# Patient Record
Sex: MALE | Race: WHITE | Employment: FULL TIME | URBAN - METROPOLITAN AREA
[De-identification: names, ages, dates, MRNs, and addresses within clinical notes are randomized per-mention and may not be internally consistent; named-entity substitution may affect disease eponyms.]

---

## 2017-01-23 ENCOUNTER — OFFICE VISIT (OUTPATIENT)
Dept: SLEEP MEDICINE | Facility: CLINIC | Age: 52
End: 2017-01-23
Attending: NURSE PRACTITIONER
Payer: COMMERCIAL

## 2017-01-23 VITALS
OXYGEN SATURATION: 93 % | WEIGHT: 182.7 LBS | SYSTOLIC BLOOD PRESSURE: 127 MMHG | HEART RATE: 70 BPM | TEMPERATURE: 96.7 F | RESPIRATION RATE: 16 BRPM | HEIGHT: 68 IN | DIASTOLIC BLOOD PRESSURE: 88 MMHG | BODY MASS INDEX: 27.69 KG/M2

## 2017-01-23 DIAGNOSIS — F51.04 PSYCHOPHYSIOLOGIC INSOMNIA: Primary | ICD-10-CM

## 2017-01-23 PROCEDURE — 99211 OFF/OP EST MAY X REQ PHY/QHP: CPT | Mod: ZF

## 2017-01-23 NOTE — PROGRESS NOTES
DATE OF VISIT:  01/23/2017      CHIEF COMPLAINT:  Fabrice Donahue returns to clinic to evaluate response and compliance of CBTI.      HISTORY OF PRESENT ILLNESS:  Mr. Donahue is a 51-year-old gentleman with a 5-year history of difficulty with sleep maintenance insomnia treated with trazodone.  He was spending approximately 8-8-1/2 hours in bed with an estimated total sleep time somewhere between 4 and 5 hours of sleep.  Worry and active thoughts did occur while he would have middle of the night wakeups with difficulty returning to sleep.  He was attempting to block thoughts of work as well with his wakeups.  He was placed on stimulus control and sleep restriction restricting his total sleep time down to about 5 hours.  Over a relatively brief period of time with improved sleep efficiency, he started to enter bed earlier.  Returns to clinic today spending approximately 8 hours in bed and sleeping roughly 7-7-1/2 hours within that period of time.  He continues with wakeups, they are less than 5-10 minutes.  Sleep latency is very brief.  He has employed the use of a meditation and deep breathing to his routine and, in fact, identifies a product called Insight Timer as very helpful to him.  Insight Timer improves his ability to fall asleep if a wakeup is prolonged.  Also, he has played around with adding exercise in the afternoon and late evening which seems to help him initiate sleep as well.  He rates his quality of sleep somewhere between a 2 and a 3 (3 hightest) most nights now and realizes that one bad night does not make a bad outcome with regard to his ability to perform at work.  He has been off of trazodone now for roughly 11 weeks without any difficulties.  One item which he did not entirely employ is stimulus control, and he reported that he just could not see himself getting up out of his bed at night when it is relatively cold in the house.      Allergies:    Allergies   Allergen Reactions     Ivp Dye [Contrast  "Dye]        Medications:    Current Outpatient Prescriptions   Medication Sig Dispense Refill     triamcinolone (KENALOG) 0.1 % cream Apply sparingly to affected area two times daily as needed for up to 14 days 45 g 1     pravastatin (PRAVACHOL) 20 MG tablet Take 1 tablet (20 mg) by mouth daily 90 tablet 2     fish oil-omega-3 fatty acids 1000 MG capsule Take 2 capsules by mouth daily. 180 capsule 12     Multiple Vitamin (MULTI-VITAMIN) per tablet Take 1 tablet by mouth daily. 100 tablet 12       Problem List:  Patient Active Problem List    Diagnosis Date Noted     Psychophysiologic insomnia 12/16/2016     Priority: Medium     Overweight (BMI 25.0-29.9) 01/01/2013     Hypertension goal BP (blood pressure) < 140/90 01/01/2013     HYPERLIPIDEMIA LDL GOAL <130 10/31/2010     Fatty liver 06/10/2008        Past Medical/Surgical History:  Past Medical History   Diagnosis Date     Essential hypertension, benign      Mixed hyperlipidemia      Unspecified hemorrhoids without mention of complication      Benign neoplasm of skin of other and unspecified parts of face      atypical nevus     Lyme disease 8/2007     hot welts, bull eye bruise     Other chronic nonalcoholic liver disease 2006     Fatty liver     Syncope 7/08     neg Head/Neck CT     Shoulder pain, right      post clavicular fx     Past Surgical History   Procedure Laterality Date     Surgical history of -   1982     L Inguinal Hernia     Hc us abdomen complete  6/2006     fatty liver     C echo heart xthoracic,complete, w/o doppler  8/08     Nl     Surgical history of -   8/09     R Clavicle Fracture Repair       Physical Examination:  Vitals: /88 mmHg  Pulse 70  Temp(Src) 96.7  F (35.9  C) (Tympanic)  Resp 16  Ht 1.727 m (5' 8\")  Wt 82.872 kg (182 lb 11.2 oz)  BMI 27.79 kg/m2  SpO2 93%  BMI= Body mass index is 27.79 kg/(m^2).    [place] Total Score 1/23/2017   Total score - [place] 3       GENERAL APPEARANCE: healthy, alert and no distress   "   ASSESSMENT AND PLAN:  It is my impression that Mr. Donahue has had an excellent response to cognitive behavioral therapy for insomnia.  He has tapered off of his sleep aid.  He employs sleep restriction and has noted an improvement in his quality of sleep.  Tools used were meditation and the following plan of care has been developed:   1.  Insomnia, psychophysiologic, in remission.  We did discuss if he has a return of insomnia how to utilize the skills that he has developed at this period of time.  I did discuss the benefits of strict adherence to stimulus control, which if a wakeup should become prolonged would limit total time in bed spent while not sleeping and may be the first tool he may want to use rather going with sleep restriction.  He is certainly willing to try any measure at this point should he have a relapse.  His attitudes regarding sleep and the skills that he has developed have been very successful for him and he is eager to continue with his current behavioral measures.      Time spent with patient 30 minutes, of which greater than 50% was spent in counseling, education and coordination of care.         KIRSTEN LOCO, CNP             D: 2017 11:21   T: 2017 12:30   MT: MANJINDER      Name:     MARCUS DONAHUE   MRN:      [id number]        Account:      [id number]   :      1965           Visit Date:   2017      Document: [id number]       cc: Clyde Gardner PsyD

## 2017-01-23 NOTE — NURSING NOTE
"Chief Complaint   Patient presents with     RECHECK     Follow up Insomnia        Initial /88 mmHg  Pulse 70  Temp(Src) 96.7  F (35.9  C) (Tympanic)  Resp 16  Ht 1.727 m (5' 8\")  Wt 82.872 kg (182 lb 11.2 oz)  BMI 27.79 kg/m2  SpO2 93% Estimated body mass index is 27.79 kg/(m^2) as calculated from the following:    Height as of this encounter: 1.727 m (5' 8\").    Weight as of this encounter: 82.872 kg (182 lb 11.2 oz).  BP completed using cuff size: PASCALE Baker  Clinic Coordinator   Registered Medical Assistant   [facility]      "

## 2017-01-23 NOTE — MR AVS SNAPSHOT
After Visit Summary   1/23/2017    Fabrice Donahue    MRN: [id number]           Patient Information     Date Of Birth          1965        Visit Information        Provider Department      1/23/2017 9:00 AM Myesha Borrego APRN [facility], [place], Sleep Study        Care Instructions    Long range plan:  If wakeups with prolonged difficulty represent: 1)Continue with meditation and deep breathing 2) consider stimulus control, up and out of bed if not sleeping, distract thoughts from worry3) keep AM wakeup similar as much as possible 4) consider sleep restriction if necessary.    Good habits: continue with limiting caffeine, continue with exercise    Follow up as necessary.    Contact PCP if bp goes up again  Your BMI is Body mass index is 27.79 kg/(m^2).  Weight management is a personal decision.  If you are interested in exploring weight loss strategies, the following discussion covers the approaches that may be successful. Body mass index (BMI) is one way to tell whether you are at a healthy weight, overweight, or obese. It measures your weight in relation to your height.  A BMI of 18.5 to 24.9 is in the healthy range. A person with a BMI of 25 to 29.9 is considered overweight, and someone with a BMI of 30 or greater is considered obese. More than two-thirds of American adults are considered overweight or obese.  Being overweight or obese increases the risk for further weight gain. Excess weight may lead to heart disease and diabetes.  Creating and following plans for healthy eating and physical activity may help you improve your health.  Weight control is part of healthy lifestyle and includes exercise, emotional health, and healthy eating habits. Careful eating habits lifelong are the mainstay of weight control. Though there are significant health benefits from weight loss, long-term weight loss with diet alone may be very difficult to achieve- studies show long-term success with dietary  management in less than 10% of people. Attaining a healthy weight may be especially difficult to achieve in those with severe obesity. In some cases, medications, devices and surgical management might be considered.  What can you do?  If you are overweight or obese and are interested in methods for weight loss, you should discuss this with your provider.     Consider reducing daily calorie intake by 500 calories.     Keep a food journal.     Avoiding skipping meals, consider cutting portions instead.    Diet combined with exercise helps maintain muscle while optimizing fat loss. Strength training is particularly important for building and maintaining muscle mass. Exercise helps reduce stress, increase energy, and improves fitness. Increasing exercise without diet control, however, may not burn enough calories to loose weight.       Start walking three days a week 10-20 minutes at a time    Work towards walking thirty minutes five days a week     Eventually, increase the speed of your walking for 1-2 minutes at time    In addition, we recommend that you review healthy lifestyles and methods for weight loss available through the National Institutes of Health patient information sites:  http://win.niddk.nih.gov/publications/index.htm    And look into health and wellness programs that may be available through your health insurance provider, employer, local community center, or [employer].    Weight management plan: Patient was referred to their PCP to discuss a diet and exercise plan.            Follow-ups after your visit        Who to contact     If you have questions or need follow up information about today's clinic visit or your schedule please [facility] directly at [phone].  Normal or non-critical lab and imaging results will be communicated to you by MyChart, letter or phone within 4 business days after the clinic has received the results. If you do not hear from us within 7 days,  "please contact the clinic through [employer] or phone. If you have a critical or abnormal lab result, we will notify you by phone as soon as possible.  Submit refill requests through [employer] or call your pharmacy and they will forward the refill request to us. Please allow 3 business days for your refill to be completed.          Additional Information About Your Visit        [employer] Information     [employer] gives you secure access to your electronic health record. If you see a primary care provider, you can also send messages to your care team and make appointments. If you have questions, please call your primary care clinic.  If you do not have a primary care provider, please call [phone] and they will assist you.        Care EveryWhere ID     This is your Care EveryWhere ID. This could be used by other organizations to access your [facility] medical records  [id number]        Your Vitals Were     Pulse Temperature Respirations Height BMI (Body Mass Index) Pulse Oximetry    70 96.7  F (35.9  C) (Tympanic) 16 1.727 m (5' 8\") 27.79 kg/m2 93%       Blood Pressure from Last 3 Encounters:   01/23/17 127/88   12/16/16 155/96   12/02/16 145/95    Weight from Last 3 Encounters:   01/23/17 82.872 kg (182 lb 11.2 oz)   12/16/16 83.008 kg (183 lb)   12/02/16 81.874 kg (180 lb 8 oz)              Today, you had the following     No orders found for display       Primary Care Provider Office Phone # Fax #    Joel Daniel Irwin Wegener, [phone]       [facility] [address] [facility] [address]        Thank you!     Thank you for choosing [facility]  for your care. Our goal is always to provide you with excellent care. Hearing back from our patients is one way we can continue to improve our services. Please take a few minutes to complete the written survey that you may receive in the mail after your visit with us. Thank you!             Your Updated Medication List - Protect others " around you: Learn how to safely use, store and throw away your medicines at www.disposemymeds.org.          This list is accurate as of: 1/23/17  9:35 AM.  Always use your most recent med list.                   Brand Name Dispense Instructions for use    fish oil-omega-3 fatty acids 1000 MG capsule     180 capsule    Take 2 capsules by mouth daily.       Multi-vitamin Tabs tablet   Generic drug:  multivitamin, therapeutic with minerals     100 tablet    Take 1 tablet by mouth daily.       pravastatin 20 MG tablet    PRAVACHOL    90 tablet    Take 1 tablet (20 mg) by mouth daily       triamcinolone 0.1 % cream    KENALOG    45 g    Apply sparingly to affected area two times daily as needed for up to 14 days

## 2017-01-23 NOTE — PROGRESS NOTES
"Allergies:    Allergies   Allergen Reactions     Ivp Dye [Contrast Dye]        Medications:    Current Outpatient Prescriptions   Medication Sig Dispense Refill     triamcinolone (KENALOG) 0.1 % cream Apply sparingly to affected area two times daily as needed for up to 14 days 45 g 1     pravastatin (PRAVACHOL) 20 MG tablet Take 1 tablet (20 mg) by mouth daily 90 tablet 2     fish oil-omega-3 fatty acids 1000 MG capsule Take 2 capsules by mouth daily. 180 capsule 12     Multiple Vitamin (MULTI-VITAMIN) per tablet Take 1 tablet by mouth daily. 100 tablet 12       Problem List:  Patient Active Problem List    Diagnosis Date Noted     Psychophysiologic insomnia 12/16/2016     Priority: Medium     Overweight (BMI 25.0-29.9) 01/01/2013     Hypertension goal BP (blood pressure) < 140/90 01/01/2013     HYPERLIPIDEMIA LDL GOAL <130 10/31/2010     Fatty liver 06/10/2008        Past Medical/Surgical History:  Past Medical History   Diagnosis Date     Essential hypertension, benign      Mixed hyperlipidemia      Unspecified hemorrhoids without mention of complication      Benign neoplasm of skin of other and unspecified parts of face      atypical nevus     Lyme disease 8/2007     hot welts, bull eye bruise     Other chronic nonalcoholic liver disease 2006     Fatty liver     Syncope 7/08     neg Head/Neck CT     Shoulder pain, right      post clavicular fx     Past Surgical History   Procedure Laterality Date     Surgical history of -   1982     L Inguinal Hernia     Hc us abdomen complete  6/2006     fatty liver     C echo heart xthoracic,complete, w/o doppler  8/08     Nl     Surgical history of -   8/09     R Clavicle Fracture Repair           Physical Examination:  Vitals: /88 mmHg  Pulse 70  Temp(Src) 96.7  F (35.9  C) (Tympanic)  Resp 16  Ht 1.727 m (5' 8\")  Wt 82.872 kg (182 lb 11.2 oz)  BMI 27.79 kg/m2  SpO2 93%  BMI= Body mass index is 27.79 kg/(m^2).         [place] Total Score 1/23/2017   Total score - " [place] 3       GENERAL APPEARANCE: healthy, alert and no distress    Insomnia  bp good control

## 2017-02-28 ENCOUNTER — OFFICE VISIT (OUTPATIENT)
Dept: FAMILY MEDICINE | Facility: CLINIC | Age: 52
End: 2017-02-28
Payer: COMMERCIAL

## 2017-02-28 VITALS
WEIGHT: 172.5 LBS | HEIGHT: 68 IN | DIASTOLIC BLOOD PRESSURE: 84 MMHG | SYSTOLIC BLOOD PRESSURE: 128 MMHG | RESPIRATION RATE: 20 BRPM | TEMPERATURE: 98.1 F | OXYGEN SATURATION: 98 % | HEART RATE: 70 BPM | BODY MASS INDEX: 26.14 KG/M2

## 2017-02-28 DIAGNOSIS — K76.0 FATTY LIVER: ICD-10-CM

## 2017-02-28 DIAGNOSIS — Z00.00 ROUTINE GENERAL MEDICAL EXAMINATION AT A HEALTH CARE FACILITY: Primary | ICD-10-CM

## 2017-02-28 DIAGNOSIS — Z13.1 SCREENING FOR DIABETES MELLITUS: ICD-10-CM

## 2017-02-28 DIAGNOSIS — E66.3 OVERWEIGHT (BMI 25.0-29.9): ICD-10-CM

## 2017-02-28 DIAGNOSIS — E78.5 HYPERLIPIDEMIA LDL GOAL <130: ICD-10-CM

## 2017-02-28 LAB
ALT SERPL W P-5'-P-CCNC: 45 U/L (ref 0–70)
AST SERPL W P-5'-P-CCNC: 27 U/L (ref 0–45)
CHOLEST SERPL-MCNC: 203 MG/DL
GLUCOSE SERPL-MCNC: 98 MG/DL (ref 70–99)
HDLC SERPL-MCNC: 36 MG/DL
LDLC SERPL CALC-MCNC: 132 MG/DL
NONHDLC SERPL-MCNC: 169 MG/DL
TRIGL SERPL-MCNC: 186 MG/DL

## 2017-02-28 PROCEDURE — 99396 PREV VISIT EST AGE 40-64: CPT | Performed by: FAMILY MEDICINE

## 2017-02-28 PROCEDURE — 82947 ASSAY GLUCOSE BLOOD QUANT: CPT | Performed by: FAMILY MEDICINE

## 2017-02-28 PROCEDURE — 80061 LIPID PANEL: CPT | Performed by: FAMILY MEDICINE

## 2017-02-28 PROCEDURE — 36415 COLL VENOUS BLD VENIPUNCTURE: CPT | Performed by: FAMILY MEDICINE

## 2017-02-28 PROCEDURE — 84460 ALANINE AMINO (ALT) (SGPT): CPT | Performed by: FAMILY MEDICINE

## 2017-02-28 PROCEDURE — 84450 TRANSFERASE (AST) (SGOT): CPT | Performed by: FAMILY MEDICINE

## 2017-02-28 RX ORDER — PRAVASTATIN SODIUM 20 MG
20 TABLET ORAL DAILY
Qty: 90 TABLET | Refills: 3 | Status: SHIPPED | OUTPATIENT
Start: 2017-02-28 | End: 2018-01-02

## 2017-02-28 NOTE — MR AVS SNAPSHOT
After Visit Summary   2/28/2017    Fabrice Donahue    MRN: [id number]           Patient Information     Date Of Birth          1965        Visit Information        Provider Department      2/28/2017 8:00 AM Wegener, Joel Daniel Irwin, MD [facility]        Today's Diagnoses     Routine general medical examination at a health care facility    -  1    Hyperlipidemia LDL goal <130        Fatty liver        Overweight (BMI 25.0-29.9)        Screening for diabetes mellitus          Care Instructions    ASSESSMENT AND PLAN  1. Routine general medical examination at a health care facility  Had flu shot already.   Doing well overall.     2. Hyperlipidemia LDL goal <130    - Lipid panel reflex to direct LDL    3. Fatty liver    - ALT  - AST    4. Overweight (BMI 25.0-29.9)    - Glucose    5. Screening for diabetes mellitus    - Glucose        MYCHART SIGNUP FOR E-VISITS AND EASIER COMMUNICATION:  [email or web address]     Zipnosis:  [place].  Sign up for e-visits for common illnesses!     RADIOLOGY:   [facility]:  [phone] to schedule any radiology tests at [facility]: [phone]    Mammograms/colonoscopies:  [phone]    CONSUMER PRICE LINE for estimates of test costs:  [phone]       Preventive Health Recommendations  Male Ages 50 - 64    Yearly exam:             See your health care provider every year in order to  o   Review health changes.   o   Discuss preventive care.    o   Review your medicines if your doctor has prescribed any.     Have a cholesterol test every 5 years, or more frequently if you are at risk for high cholesterol/heart disease.     Have a diabetes test (fasting glucose) every three years. If you are at risk for diabetes, you should have this test more often.     Have a colonoscopy at age 50, or have a yearly FIT test (stool test). These exams will check for colon cancer.      Talk with your health care provider  about whether or not a prostate cancer screening test (PSA) is right for you.    You should be tested each year for STDs (sexually transmitted diseases), if you re at risk.     Shots: Get a flu shot each year. Get a tetanus shot every 10 years.     Nutrition:    Eat at least 5 servings of fruits and vegetables daily.     Eat whole-grain bread, whole-wheat pasta and brown rice instead of white grains and rice.     Talk to your provider about Calcium and Vitamin D.     Lifestyle    Exercise for at least 150 minutes a week (30 minutes a day, 5 days a week). This will help you control your weight and prevent disease.     Limit alcohol to one drink per day.     No smoking.     Wear sunscreen to prevent skin cancer.     See your dentist every six months for an exam and cleaning.     See your eye doctor every 1 to 2 years.          Follow-ups after your visit        Who to contact     If you have questions or need follow up information about today's clinic visit or your schedule please contact [facility] directly at [phone].  Normal or non-critical lab and imaging results will be communicated to you by [employer], letter or phone within 4 business days after the clinic has received the results. If you do not hear from us within 7 days, please contact the clinic through [employer] or phone. If you have a critical or abnormal lab result, we will notify you by phone as soon as possible.  Submit refill requests through [employer] or call your pharmacy and they will forward the refill request to us. Please allow 3 business days for your refill to be completed.          Additional Information About Your Visit        [employer] Information     [employer] gives you secure access to your electronic health record. If you see a primary care provider, you can also send messages to your care team and make appointments. If you have questions, please call your primary care clinic.  If you do not have a primary care provider, please  "call [phone] and they will assist you.        Care EveryWhere ID     This is your Care EveryWhere ID. This could be used by other organizations to access your [facility] medical records  [id number]        Your Vitals Were     Pulse Temperature Respirations Height Pulse Oximetry BMI (Body Mass Index)    70 98.1  F (36.7  C) (Oral) 20 5' 7.75\" (1.721 m) 98% 26.42 kg/m2       Blood Pressure from Last 3 Encounters:   02/28/17 128/84   01/23/17 127/88   12/16/16 (!) 155/96    Weight from Last 3 Encounters:   02/28/17 172 lb 8 oz (78.2 kg)   01/23/17 182 lb 11.2 oz (82.9 kg)   12/16/16 183 lb (83 kg)              We Performed the Following     ALT     AST     Glucose     Lipid panel reflex to direct LDL        Primary Care Provider Office Phone # Fax #    Joel Daniel Irwin Wegener, [phone]       [address] [address]        Thank you!     Thank you for choosing [facility]  for your care. Our goal is always to provide you with excellent care. Hearing back from our patients is one way we can continue to improve our services. Please take a few minutes to complete the written survey that you may receive in the mail after your visit with us. Thank you!             Your Updated Medication List - Protect others around you: Learn how to safely use, store and throw away your medicines at www.disposemymeds.org.          This list is accurate as of: 2/28/17  8:24 AM.  Always use your most recent med list.                   Brand Name Dispense Instructions for use    fish oil-omega-3 fatty acids 1000 MG capsule     180 capsule    Take 2 capsules by mouth daily.       Multi-vitamin Tabs tablet   Generic drug:  multivitamin, therapeutic with minerals     100 tablet    Take 1 tablet by mouth daily.       pravastatin 20 MG tablet    PRAVACHOL    90 tablet    Take 1 tablet (20 mg) by mouth daily       triamcinolone 0.1 % cream    KENALOG    45 g    Apply sparingly " to affected area two times daily as needed for up to 14 days

## 2017-02-28 NOTE — PATIENT INSTRUCTIONS
ASSESSMENT AND PLAN  1. Routine general medical examination at a health care facility  Had flu shot already.   Doing well overall.     2. Hyperlipidemia LDL goal <130    - Lipid panel reflex to direct LDL    3. Fatty liver    - ALT  - AST    4. Overweight (BMI 25.0-29.9)    - Glucose    5. Screening for diabetes mellitus    - Glucose        MYCHART SIGNUP FOR E-VISITS AND EASIER COMMUNICATION:  [email or web address]     Zipnosis:  [employer].  Sign up for e-visits for common illnesses!     RADIOLOGY:   [facility]:  [phone] to schedule any radiology tests at [facility]: [phone]    Mammograms/colonoscopies:  [phone]    CONSUMER PRICE LINE for estimates of test costs:  [phone]       Preventive Health Recommendations  Male Ages 50   64    Yearly exam:             See your health care provider every year in order to  o   Review health changes.   o   Discuss preventive care.    o   Review your medicines if your doctor has prescribed any.     Have a cholesterol test every 5 years, or more frequently if you are at risk for high cholesterol/heart disease.     Have a diabetes test (fasting glucose) every three years. If you are at risk for diabetes, you should have this test more often.     Have a colonoscopy at age 50, or have a yearly FIT test (stool test). These exams will check for colon cancer.      Talk with your health care provider about whether or not a prostate cancer screening test (PSA) is right for you.    You should be tested each year for STDs (sexually transmitted diseases), if you re at risk.     Shots: Get a flu shot each year. Get a tetanus shot every 10 years.     Nutrition:    Eat at least 5 servings of fruits and vegetables daily.     Eat whole-grain bread, whole-wheat pasta and brown rice instead of white grains and rice.     Talk to your provider about Calcium and Vitamin D.     Lifestyle    Exercise for at least 150 minutes a week (30  minutes a day, 5 days a week). This will help you control your weight and prevent disease.     Limit alcohol to one drink per day.     No smoking.     Wear sunscreen to prevent skin cancer.     See your dentist every six months for an exam and cleaning.     See your eye doctor every 1 to 2 years.

## 2017-02-28 NOTE — PROGRESS NOTES
SUBJECTIVE:     CC: Fabrice Donahue is an 51 year old male who presents for preventative health visit.     Physical   Annual:     Getting at least 3 servings of Calcium per day::  NO    Bi-annual eye exam::  Yes    Dental care twice a year::  Yes    Sleep apnea or symptoms of sleep apnea::  None    Diet::  Regular (no restrictions)    Frequency of exercise::  4-5 days/week    Duration of exercise::  Greater than 60 minutes    Taking medications regularly::  Yes    Medication side effects::  Muscle aches and Significant flushing    Additional concerns today::  No      Other:  BP has been up a bit    Today's PHQ-2 Score:   PHQ-2 ( 1999 Pfizer) 2/25/2017   Little interest or pleasure in doing things Not at all   Feeling down, depressed or hopeless Not at all   PHQ-2 Score 0       Abuse: Current or Past(Physical, Sexual or Emotional)- No  Do you feel safe in your environment - Yes    Social History   Substance Use Topics     Smoking status: Former Smoker     Years: 5.00     Types: Cigarettes     Quit date: 10/1/2010     Smokeless tobacco: Never Used     Alcohol use Yes      Comment: Less than 1 drink a week     The patient does not drink >3 drinks per day nor >7 drinks per week.    Last PSA:   PSA   Date Value Ref Range Status   02/17/2015 0.78 0 - 4 ug/L Final       Recent Labs   Lab Test  05/03/16   0735  02/22/16   0834  02/17/15   0805  01/20/14   0850   CHOL  192  168  180  189   HDL  35*  35*  32*  27*   LDL  123*  106*  90  116   TRIG  170*  135  292*  230*   CHOLHDLRATIO   --    --   5.6*  7.0*   NHDL  157*  133*   --    --        Reviewed orders with patient. Reviewed health maintenance and updated orders accordingly - Yes    All Histories reviewed and updated in Epic.  Past Medical History   Diagnosis Date     Benign neoplasm of skin of other and unspecified parts of face      atypical nevus     Essential hypertension, benign      Lyme disease 8/2007     hot welts, bull eye bruise     Mixed hyperlipidemia       "Other chronic nonalcoholic liver disease 2006     Fatty liver     Shoulder pain, right      post clavicular fx     Syncope 7/08     neg Head/Neck CT     Unspecified hemorrhoids without mention of complication       Past Surgical History   Procedure Laterality Date     Surgical history of -   1982     L Inguinal Hernia     Hc us abdomen complete  6/2006     fatty liver     C echo heart xthoracic,complete, w/o doppler  8/08     Nl     Surgical history of -   8/09     R Clavicle Fracture Repair     Colonoscopy  04/2014     Hernia repair  1984       ROS:  C: NEGATIVE for fever, chills, change in weight  I: NEGATIVE for worrisome rashes, moles or lesions  E: NEGATIVE for vision changes or irritation  ENT: NEGATIVE for ear, mouth and throat problems  R: NEGATIVE for significant cough or SOB  CV: NEGATIVE for chest pain, palpitations or peripheral edema  GI: NEGATIVE for nausea, abdominal pain, heartburn, or change in bowel habits   male: negative for dysuria, hematuria, decreased urinary stream, erectile dysfunction, urethral discharge  M: NEGATIVE for significant arthralgias or myalgia  N: NEGATIVE for weakness, dizziness or paresthesias  P: NEGATIVE for changes in mood or affect    Problem list, Medication list, Allergies, and Medical/Social/Surgical histories reviewed in [facility] and updated as appropriate.  OBJECTIVE:     /84 (BP Location: Left arm, Patient Position: Chair, Cuff Size: Adult Regular)  Pulse 70  Temp 98.1  F (36.7  C) (Oral)  Resp 20  Ht 5' 7.75\" (1.721 m)  Wt 172 lb 8 oz (78.2 kg)  SpO2 98%  BMI 26.42 kg/m2  EXAM:  GENERAL: healthy, alert and no distress  EYES: Eyes grossly normal to inspection, PERRL and conjunctivae and sclerae normal  HENT: ear canals and TM's normal, nose and mouth without ulcers or lesions  NECK: no adenopathy, no asymmetry, masses, or scars and thyroid normal to palpation  RESP: lungs clear to auscultation - no rales, rhonchi or wheezes  CV: regular rate and rhythm, normal S1 " "S2, no S3 or S4, no murmur, click or rub, no peripheral edema and peripheral pulses strong  ABDOMEN: soft, nontender, no hepatosplenomegaly, no masses and bowel sounds normal  MS: no gross musculoskeletal defects noted, no edema  SKIN: no suspicious lesions or rashes  NEURO: Normal strength and tone, mentation intact and speech normal  PSYCH: mentation appears normal, affect normal/bright    ASSESSMENT/PLAN:     ASSESSMENT AND PLAN  1. Routine general medical examination at a health care facility  Had flu shot already.   Doing well overall.     2. Hyperlipidemia LDL goal <130    - Lipid panel reflex to direct LDL    3. Fatty liver    - ALT  - AST    4. Overweight (BMI 25.0-29.9)    - Glucose    5. Screening for diabetes mellitus    - Glucose    COUNSELING:   Reviewed preventive health counseling, as reflected in patient instructions       Regular exercise       Healthy diet/nutrition       Colon cancer screening       Prostate cancer screening    BP Screening:   Last 3 BP Readings:    BP Readings from Last 3 Encounters:   02/28/17 128/84   01/23/17 127/88   12/16/16 (!) 155/96       The following was recommended to the patient:  Re-screen BP within a year and recommended lifestyle modifications     reports that he quit smoking about 6 years ago. His smoking use included Cigarettes. He quit after 5.00 years of use. He has never used smokeless tobacco.    Estimated body mass index is 26.42 kg/(m^2) as calculated from the following:    Height as of this encounter: 5' 7.75\" (1.721 m).    Weight as of this encounter: 172 lb 8 oz (78.2 kg).   Weight management plan:   discussed lifestyle modification, re-check one year.     Counseling Resources:  ATP IV Guidelines  Pooled Cohorts Equation Calculator  FRAX Risk Assessment  ICSI Preventive Guidelines  Dietary Guidelines for Americans, 2010  [employer]'s MyPlate  ASA Prophylaxis  Lung CA Screening    Joel Daniel Wegener, MD  [facility]  Answers for HPI/ROS submitted " by the patient on 2/25/2017   PHQ-2 Depressed: Not at all, Not at all  PHQ-2 Score: 0

## 2017-02-28 NOTE — NURSING NOTE
"Chief Complaint   Patient presents with     Physical       Initial /84 (BP Location: Left arm, Patient Position: Chair, Cuff Size: Adult Regular)  Pulse 70  Temp 98.1  F (36.7  C) (Oral)  Resp 20  Ht 5' 7.75\" (1.721 m)  Wt 172 lb 8 oz (78.2 kg)  SpO2 98%  BMI 26.42 kg/m2 Estimated body mass index is 26.42 kg/(m^2) as calculated from the following:    Height as of this encounter: 5' 7.75\" (1.721 m).    Weight as of this encounter: 172 lb 8 oz (78.2 kg).  Medication Reconciliation: complete     Apollo Guillory CMA  "

## 2017-12-04 ENCOUNTER — MYC MEDICAL ADVICE (OUTPATIENT)
Dept: FAMILY MEDICINE | Facility: CLINIC | Age: 52
End: 2017-12-04

## 2017-12-18 ENCOUNTER — OFFICE VISIT (OUTPATIENT)
Dept: FAMILY MEDICINE | Facility: CLINIC | Age: 52
End: 2017-12-18
Payer: COMMERCIAL

## 2017-12-18 VITALS
WEIGHT: 189 LBS | RESPIRATION RATE: 16 BRPM | OXYGEN SATURATION: 94 % | BODY MASS INDEX: 28.95 KG/M2 | TEMPERATURE: 97.8 F | HEART RATE: 81 BPM | SYSTOLIC BLOOD PRESSURE: 160 MMHG | DIASTOLIC BLOOD PRESSURE: 116 MMHG

## 2017-12-18 DIAGNOSIS — I10 HYPERTENSION GOAL BP (BLOOD PRESSURE) < 140/90: Primary | ICD-10-CM

## 2017-12-18 PROCEDURE — 99213 OFFICE O/P EST LOW 20 MIN: CPT | Performed by: FAMILY MEDICINE

## 2017-12-18 RX ORDER — LISINOPRIL 20 MG/1
20 TABLET ORAL DAILY
Qty: 30 TABLET | Refills: 1 | Status: SHIPPED | OUTPATIENT
Start: 2017-12-18 | End: 2017-12-28

## 2017-12-18 NOTE — MR AVS SNAPSHOT
After Visit Summary   12/18/2017    Johnny Castillo    MRN: [id number]           Patient Information     Date Of Birth          1965        Visit Information        Provider Department      12/18/2017 9:00 AM Wegener, Joel Daniel Irwin, MD [facility]        Today's Diagnoses     Hypertension goal BP (blood pressure) < 140/90    -  1      Care Instructions    ASSESSMENT AND PLAN  1. Hypertension goal BP (blood pressure) < 140/90  Re-start lisinopril 20mg (good results before).     Re-starting intensive bicycle program again which was quite helpful in past.     please put in johnny castillo for a cyst removal and bp check january 9th at 8:00 and 8:20 (40 minutes).     - lisinopril (PRINIVIL/ZESTRIL) 20 MG tablet; Take 1 tablet (20 mg) by mouth daily  Dispense: 30 tablet; Refill: 1        Care Team ConnectHART SIGNUP FOR E-VISITS AND EASIER COMMUNICATION:  [email or web address]     Zipnosis:  [place].[employer]  Sign up for e-visits for common illnesses!     RADIOLOGY:   [facility]:  [phone] to schedule any radiology tests at [facility]: [phone]    Mammograms/colonoscopies:  [phone]    CONSUMER PRICE LINE for estimates of test costs:  [phone]               Follow-ups after your visit        Who to contact     If you have questions or need follow up information about today's clinic visit or your schedule please contact [facility]note 2] directly at [phone].  Normal or non-critical lab and imaging results will be communicated to you by MyChart, letter or phone within 4 business days after the clinic has received the results. If you do not hear from us within 7 days, please contact the clinic through MyChart or phone. If you have a critical or abnormal lab result, we will notify you by phone as soon as possible.  Submit refill requests through [employer] or call your pharmacy and they will forward the refill request to us. Please allow 3  business days for your refill to be completed.          Additional Information About Your Visit        MyChart Information     [employer] gives you secure access to your electronic health record. If you see a primary care provider, you can also send messages to your care team and make appointments. If you have questions, please call your primary care clinic.  If you do not have a primary care provider, please call [phone] and they will assist you.        Care EveryWhere ID     This is your Care EveryWhere ID. This could be used by other organizations to access your [facility] medical records  [id number]        Your Vitals Were     Pulse Temperature Respirations Pulse Oximetry BMI (Body Mass Index)       81 97.8  F (36.6  C) (Oral) 16 94% 28.95 kg/m2        Blood Pressure from Last 3 Encounters:   12/18/17 (!) 160/116   02/28/17 128/84   01/23/17 127/88    Weight from Last 3 Encounters:   12/18/17 189 lb (85.7 kg)   02/28/17 172 lb 8 oz (78.2 kg)   01/23/17 182 lb 11.2 oz (82.9 kg)              Today, you had the following     No orders found for display         Today's Medication Changes          These changes are accurate as of: 12/18/17 10:03 AM.  If you have any questions, ask your nurse or doctor.               Start taking these medicines.        Dose/Directions    lisinopril 20 MG tablet   Commonly known as:  PRINIVIL/ZESTRIL   Used for:  Hypertension goal BP (blood pressure) < 140/90   Started by:  Wegener, Joel Daniel Irwin, MD        Dose:  20 mg   Take 1 tablet (20 mg) by mouth daily   Quantity:  30 tablet   Refills:  1            Where to get your medicines      These medications were sent to [facility]/pharmacy #[id number] - [place], [place] - [address]  [address], [place] [address]     Phone:  [phone]     lisinopril 20 MG tablet                Primary Care Provider Office Phone # Fax #    Joel Daniel Irwin Wegener, [phone]  [facility] [id number]         Equal Access to Services     [facility]: Hadii isis hoffman neetalashon Chazali, waaxda luqadaha, qaybta kacaingene good, grace barnes. So [facility] [phone].    ATENCIÓN: Si habla español, tiene a ayala disposición servicios gratuitos de asistencia lingüística. Llame al [phone].    We comply with applicable federal civil rights laws and [place] laws. We do not discriminate on the basis of race, color, national origin, age, disability, sex, sexual orientation, or gender identity.            Thank you!     Thank you for choosing [facility]  for your care. Our goal is always to provide you with excellent care. Hearing back from our patients is one way we can continue to improve our services. Please take a few minutes to complete the written survey that you may receive in the mail after your visit with us. Thank you!             Your Updated Medication List - Protect others around you: Learn how to safely use, store and throw away your medicines at www.disposemymeds.org.          This list is accurate as of: 12/18/17 10:03 AM.  Always use your most recent med list.                   Brand Name Dispense Instructions for use Diagnosis    fish oil-omega-3 fatty acids 1000 MG capsule     180 capsule    Take 2 capsules by mouth daily.        lisinopril 20 MG tablet    PRINIVIL/ZESTRIL    30 tablet    Take 1 tablet (20 mg) by mouth daily    Hypertension goal BP (blood pressure) < 140/90       Multi-vitamin Tabs tablet   Generic drug:  multivitamin, therapeutic with minerals     100 tablet    Take 1 tablet by mouth daily.        pravastatin 20 MG tablet    PRAVACHOL    90 tablet    Take 1 tablet (20 mg) by mouth daily    Hyperlipidemia LDL goal <130

## 2017-12-18 NOTE — PATIENT INSTRUCTIONS
ASSESSMENT AND PLAN  1. Hypertension goal BP (blood pressure) < 140/90  Re-start lisinopril 20mg (good results before).     Re-starting intensive bicycle program again which was quite helpful in past.     please put in johnny castillo for a cyst removal and bp check january 9th at 8:00 and 8:20 (40 minutes).     - lisinopril (PRINIVIL/ZESTRIL) 20 MG tablet; Take 1 tablet (20 mg) by mouth daily  Dispense: 30 tablet; Refill: 1        MYCHART SIGNUP FOR E-VISITS AND EASIER COMMUNICATION:  [email or web address]     Zipnosis:  [place].[employer].  Sign up for e-visits for common illnesses!     RADIOLOGY:   [facility]:  [phone] to schedule any radiology tests at [facility]: [phone]    Mammograms/colonoscopies:  [phone]    CONSUMER PRICE LINE for estimates of test costs:  [phone]

## 2017-12-18 NOTE — PROGRESS NOTES
Additional history/life update: He has been checking is BP at home once a day for about 2 weeks, getting readings around 150/90. He was previously on lisinopril, which was stopped about 2 years ago because he had lost a lot of weight and his BP had normalized. This fall, he has not been able to exercise as much as he usually does and has gained some of the weight back. However, he is starting a biking program that will consist of 4 to 5 days a week for about an hour a day. He is hoping to loose about 20 pounds and get his bp back to normal. However, he is open to restarting the lisinopril again.     Also, he has a cyst on his chest that had been previously drained, but has grown back. He would like to have it drained again.     Hypertension goal BP (blood pressure) < 140/90         Problem list, Medication list, Allergies, and Medical/Social/Surgical histories reviewed in EP[facility] and updated as appropriate.  Labs reviewed in EPIC  BP Readings from Last 3 Encounters:   12/18/17 (!) 160/116   02/28/17 128/84   01/23/17 127/88    Wt Readings from Last 3 Encounters:   12/18/17 189 lb (85.7 kg)   02/28/17 172 lb 8 oz (78.2 kg)   01/23/17 182 lb 11.2 oz (82.9 kg)                  Patient Active Problem List   Diagnosis     Fatty liver     HYPERLIPIDEMIA LDL GOAL <130     Overweight (BMI 25.0-29.9)     Hypertension goal BP (blood pressure) < 140/90     Psychophysiologic insomnia     Past Surgical History:   Procedure Laterality Date     C ECHO HEART XTHORACIC,COMPLETE, W/O DOPPLER  8/08    Nl     COLONOSCOPY  04/2014     HC US ABDOMEN COMPLETE  6/2006    fatty liver     HERNIA REPAIR  1984     SURGICAL HISTORY OF -   1982    L Inguinal Hernia     SURGICAL HISTORY OF -   8/09    R Clavicle Fracture Repair       Social History   Substance Use Topics     Smoking status: Former Smoker     Years: 5.00     Types: Cigarettes     Quit date: 10/1/2010     Smokeless tobacco: Never Used     Alcohol use Yes      Comment: Less than 1  drink a week     Family History   Problem Relation Age of Onset     Hypertension Mother      CANCER Mother      kidney     Lipids Mother      high     Thyroid Disease Mother      hyper     Hyperlipidemia Mother      Respiratory Father      emphysema     Hypertension Maternal Grandmother      CEREBROVASCULAR DISEASE Maternal Grandmother      Lipids Sister      high     Hypertension Sister      Lipids Brother      high     Hypertension Brother      DIABETES Maternal Aunt      type II         Current Outpatient Prescriptions   Medication Sig Dispense Refill     lisinopril (PRINIVIL/ZESTRIL) 20 MG tablet Take 1 tablet (20 mg) by mouth daily 30 tablet 1     pravastatin (PRAVACHOL) 20 MG tablet Take 1 tablet (20 mg) by mouth daily 90 tablet 3     fish oil-omega-3 fatty acids 1000 MG capsule Take 2 capsules by mouth daily. 180 capsule 12     Multiple Vitamin (MULTI-VITAMIN) per tablet Take 1 tablet by mouth daily. 100 tablet 12     Allergies   Allergen Reactions     Ivp Dye [Contrast Dye]      Recent Labs   Lab Test  02/28/17   0840  05/03/16   0735  03/14/16   0831  02/22/16   0834  02/17/15   0805   01/02/13   0846   LDL  132*  123*   --   106*  90   < >  107   HDL  36*  35*   --   35*  32*   < >  26*   TRIG  186*  170*   --   135  292*   < >  322*   ALT  45   --    --   35  83*   < >  105*   CR   --    --    --   0.64*  0.70   < >  0.72   GFRESTIMATED   --    --    --   >90  Non  GFR Calc    >90  Non  GFR Calc     < >  >90   GFRESTBLACK   --    --    --   >90   GFR Calc    >90   GFR Calc     < >  >90   POTASSIUM   --    --    --   4.2  3.7   < >  4.4   TSH   --    --   2.11   --    --    --   2.00    < > = values in this interval not displayed.        ROS:  Constitutional, HEENT, cardiovascular, pulmonary, GI, , musculoskeletal, neuro, skin, endocrine and psych systems are negative, except as otherwise noted.        OBJECTIVE:  BP (!) 160/116 (BP  Location: Left arm, Patient Position: Sitting, Cuff Size: Adult Regular)  Pulse 81  Temp 97.8  F (36.6  C) (Oral)  Resp 16  Wt 189 lb (85.7 kg)  SpO2 94%  BMI 28.95 kg/m2    EXAM:  GENERAL APPEARANCE: healthy, alert and no distress  RESP: lungs clear to auscultation - no rales, rhonchi or wheezes  CV: regular rates and rhythm, normal S1 S2, no S3 or S4 and no murmur, click or rub -  Skin: 1 cm soft nodule on the lower right sternal border.       ASSESSMENT AND PLAN  Patient Instructions   ASSESSMENT AND PLAN  1. Hypertension goal BP (blood pressure) < 140/90 - uncontrolld  Re-start lisinopril 20mg (good results before).     Re-starting intensive bicycle program again which was quite helpful in past.     BP check January 9th at 8 AM     - lisinopril (PRINIVIL/ZESTRIL) 20 MG tablet; Take 1 tablet (20 mg) by mouth daily  Dispense: 30 tablet; Refill: 1    2. Cyst  Scheduled for cyst removal on January 9th at 8:00 AM.         [facility] SIGNUP FOR E-VISITS AND EASIER COMMUNICATION:  [email or web address]     Zipnosis:  [place].[employer].  Sign up for e-visits for common illnesses!     RADIOLOGY:   [facility]:  [phone] to schedule any radiology tests at [facility]: [phone]    Mammograms/colonoscopies:  [phone]    CONSUMER PRICE LINE for estimates of test costs:  [phone]              Scribed by Guille Escalona, Medical Student for Joel Wegener, MD based on the provider s statements to me.        I have reviewed and edited the medical student s documentation acting as scribe and verify that the history, exam and medical decision making reflect the history, exam and decision making performed by myself today.    Joel Wegener,MD

## 2017-12-18 NOTE — NURSING NOTE
"Chief Complaint   Patient presents with     Hypertension     requested Labs-lipids       Initial BP (!) 160/116 (BP Location: Left arm, Patient Position: Sitting, Cuff Size: Adult Regular)  Pulse 81  Temp 97.8  F (36.6  C) (Oral)  Resp 16  Wt 189 lb (85.7 kg)  SpO2 94%  BMI 28.95 kg/m2 Estimated body mass index is 28.95 kg/(m^2) as calculated from the following:    Height as of 2/28/17: 5' 7.75\" (1.721 m).    Weight as of this encounter: 189 lb (85.7 kg).  Medication Reconciliation: complete     Latoya Mariscal MA      "

## 2017-12-27 ENCOUNTER — MYC MEDICAL ADVICE (OUTPATIENT)
Dept: FAMILY MEDICINE | Facility: CLINIC | Age: 52
End: 2017-12-27

## 2017-12-27 DIAGNOSIS — I10 HYPERTENSION GOAL BP (BLOOD PRESSURE) < 140/90: ICD-10-CM

## 2017-12-27 NOTE — TELEPHONE ENCOUNTER
BP Readings from Last 6 Encounters:   12/18/17 (!) 160/116   02/28/17 128/84   01/23/17 127/88   12/16/16 (!) 155/96   12/02/16 (!) 145/95   06/18/16 138/80       Dr. Wegener-Please review.  Would you like to change/add BP medication?    Thank you!  EMILY DysonN, RN

## 2017-12-28 RX ORDER — LISINOPRIL 20 MG/1
40 TABLET ORAL DAILY
COMMUNITY
Start: 2017-12-28 | End: 2018-01-08

## 2017-12-28 NOTE — TELEPHONE ENCOUNTER
Noted.    Med list updated.    No further action needed from triage.  YELENA Carmichael, BSN, RN

## 2018-01-09 ENCOUNTER — OFFICE VISIT (OUTPATIENT)
Dept: FAMILY MEDICINE | Facility: CLINIC | Age: 53
End: 2018-01-09
Payer: COMMERCIAL

## 2018-01-09 VITALS
SYSTOLIC BLOOD PRESSURE: 110 MMHG | RESPIRATION RATE: 23 BRPM | WEIGHT: 187 LBS | HEART RATE: 74 BPM | DIASTOLIC BLOOD PRESSURE: 80 MMHG | BODY MASS INDEX: 28.64 KG/M2 | TEMPERATURE: 97.8 F | OXYGEN SATURATION: 94 %

## 2018-01-09 DIAGNOSIS — E78.5 HYPERLIPIDEMIA LDL GOAL <130: ICD-10-CM

## 2018-01-09 DIAGNOSIS — K76.0 FATTY LIVER: ICD-10-CM

## 2018-01-09 DIAGNOSIS — I10 HYPERTENSION GOAL BP (BLOOD PRESSURE) < 140/90: ICD-10-CM

## 2018-01-09 DIAGNOSIS — L72.3 SEBACEOUS CYST: Primary | ICD-10-CM

## 2018-01-09 LAB
ALBUMIN SERPL-MCNC: 4.5 G/DL (ref 3.4–5)
ALP SERPL-CCNC: 63 U/L (ref 40–150)
ALT SERPL W P-5'-P-CCNC: 64 U/L (ref 0–70)
ANION GAP SERPL CALCULATED.3IONS-SCNC: 6 MMOL/L (ref 3–14)
AST SERPL W P-5'-P-CCNC: 24 U/L (ref 0–45)
BILIRUB SERPL-MCNC: 0.5 MG/DL (ref 0.2–1.3)
BUN SERPL-MCNC: 11 MG/DL (ref 7–30)
CALCIUM SERPL-MCNC: 9.3 MG/DL (ref 8.5–10.1)
CHLORIDE SERPL-SCNC: 103 MMOL/L (ref 94–109)
CHOLEST SERPL-MCNC: 211 MG/DL
CO2 SERPL-SCNC: 29 MMOL/L (ref 20–32)
CREAT SERPL-MCNC: 0.71 MG/DL (ref 0.66–1.25)
CREAT UR-MCNC: 159 MG/DL
GFR SERPL CREATININE-BSD FRML MDRD: >90 ML/MIN/1.7M2
GLUCOSE SERPL-MCNC: 95 MG/DL (ref 70–99)
HDLC SERPL-MCNC: 33 MG/DL
LDLC SERPL CALC-MCNC: 136 MG/DL
MICROALBUMIN UR-MCNC: 9 MG/L
MICROALBUMIN/CREAT UR: 5.4 MG/G CR (ref 0–17)
NONHDLC SERPL-MCNC: 178 MG/DL
POTASSIUM SERPL-SCNC: 4.3 MMOL/L (ref 3.4–5.3)
PROT SERPL-MCNC: 7.8 G/DL (ref 6.8–8.8)
SODIUM SERPL-SCNC: 138 MMOL/L (ref 133–144)
TRIGL SERPL-MCNC: 211 MG/DL

## 2018-01-09 PROCEDURE — 80061 LIPID PANEL: CPT | Performed by: FAMILY MEDICINE

## 2018-01-09 PROCEDURE — 82043 UR ALBUMIN QUANTITATIVE: CPT | Performed by: FAMILY MEDICINE

## 2018-01-09 PROCEDURE — 11401 EXC TR-EXT B9+MARG 0.6-1 CM: CPT | Performed by: FAMILY MEDICINE

## 2018-01-09 PROCEDURE — 36415 COLL VENOUS BLD VENIPUNCTURE: CPT | Performed by: FAMILY MEDICINE

## 2018-01-09 PROCEDURE — 80053 COMPREHEN METABOLIC PANEL: CPT | Performed by: FAMILY MEDICINE

## 2018-01-09 NOTE — PROGRESS NOTES
SUBJECTIVE:   Fabrice Donahue is a 52 year old male who presents to clinic today for the following health issues:      Biopsy and excision and drain procedure    Additional history/life update:       Sebaceous cyst: on chest, gets irritated from sweat, smelly.  Would like removed.   Hyperlipidemia LDL goal <130  Fatty liver  Hypertension goal BP (blood pressure) < 140/90 :        Problem list, Medication list, Allergies, and Medical/Social/Surgical histories reviewed in [facility] and updated as appropriate.  Labs reviewed in [facility]  BP Readings from Last 3 Encounters:   01/09/18 110/80   12/18/17 (!) 160/116   02/28/17 128/84    Wt Readings from Last 3 Encounters:   01/09/18 187 lb (84.8 kg)   12/18/17 189 lb (85.7 kg)   02/28/17 172 lb 8 oz (78.2 kg)                  Patient Active Problem List   Diagnosis     Fatty liver     HYPERLIPIDEMIA LDL GOAL <130     Overweight (BMI 25.0-29.9)     Hypertension goal BP (blood pressure) < 140/90     Psychophysiologic insomnia     Past Surgical History:   Procedure Laterality Date     C ECHO HEART XTHORACIC,COMPLETE, W/O DOPPLER  8/08    Nl     COLONOSCOPY  04/2014     HC US ABDOMEN COMPLETE  6/2006    fatty liver     HERNIA REPAIR  1984     SURGICAL HISTORY OF -   1982    L Inguinal Hernia     SURGICAL HISTORY OF -   8/09    R Clavicle Fracture Repair       Social History   Substance Use Topics     Smoking status: Former Smoker     Packs/day: 0.50     Years: 5.00     Types: Cigarettes     Quit date: 10/1/2010     Smokeless tobacco: Never Used     Alcohol use Yes      Comment: Less than 1 drink a week     Family History   Problem Relation Age of Onset     Hypertension Mother      CANCER Mother      kidney     Lipids Mother      high     Thyroid Disease Mother      hyper     Hyperlipidemia Mother      Respiratory Father      emphysema     Hypertension Maternal Grandmother      CEREBROVASCULAR DISEASE Maternal Grandmother      Lipids Sister      high     Hypertension Sister      Lipids  Brother      high     Hypertension Brother      DIABETES Maternal Aunt      type II     Hypertension Sister          Current Outpatient Prescriptions   Medication Sig Dispense Refill     lisinopril (PRINIVIL/ZESTRIL) 40 MG tablet Take 1 tablet (40 mg) by mouth daily 90 tablet 3     pravastatin (PRAVACHOL) 20 MG tablet TAKE 1 TABLET(20 MG) BY MOUTH DAILY 30 tablet 0     fish oil-omega-3 fatty acids 1000 MG capsule Take 2 capsules by mouth daily. 180 capsule 12     Multiple Vitamin (MULTI-VITAMIN) per tablet Take 1 tablet by mouth daily. 100 tablet 12     Allergies   Allergen Reactions     Ivp Dye [Contrast Dye]      Recent Labs   Lab Test  02/28/17   0840  05/03/16   0735  03/14/16   0831  02/22/16   0834  02/17/15   0805   01/02/13   0846   LDL  132*  123*   --   106*  90   < >  107   HDL  36*  35*   --   35*  32*   < >  26*   TRIG  186*  170*   --   135  292*   < >  322*   ALT  45   --    --   35  83*   < >  105*   CR   --    --    --   0.64*  0.70   < >  0.72   GFRESTIMATED   --    --    --   >90  Non  GFR Calc    >90  Non  GFR Calc     < >  >90   GFRESTBLACK   --    --    --   >90   GFR Calc    >90   GFR Calc     < >  >90   POTASSIUM   --    --    --   4.2  3.7   < >  4.4   TSH   --    --   2.11   --    --    --   2.00    < > = values in this interval not displayed.        ROS:  Constitutional, HEENT, cardiovascular, pulmonary, GI, , musculoskeletal, neuro, skin, endocrine and psych systems are negative, except as otherwise noted.        OBJECTIVE:  /80  Pulse 74  Temp 97.8  F (36.6  C) (Oral)  Resp 23  Wt 187 lb (84.8 kg)  SpO2 94%  BMI 28.64 kg/m2    EXAM:  GENERAL APPEARANCE: healthy, alert and no distress  In center of chest over sternum has a slightly red/irritated 1 cm round cystic structure with small punctum in center consistent with sebaceous cyst.     ASSESSMENT AND PLAN  1. Sebaceous cyst      After written and oral informed  consent and after anesthesia in ring block fashion with 10cc 1% lidocaine with epinepherine in sterile fashion performed cyst removal. After cleaning the area with iodine swabs and prepping in sterile fashion I made an incision around the cyst with a 8mm punch biopsy.  The cyst was then dissected out  by cutting underneath it with an iris scissors in the subcutaneous layer and removing the contents and cyst wall with an alligator clamp. I then Closed the wound with three 4-0 ethilon interrupted sutures.     Instructed not to get wet, use daily bacitracin and guaze dressing and return to clinic in one week  To 10 days for suture removal with a nurse visit. Tolerated procedure well with minimal blood loss.     B9 lesion (except tags) excised diameter,  0.6 - 1 cm trunk, arms, legs,  [id number]     Labs today prior to physical in February.     2. Hyperlipidemia LDL goal <130    - Lipid panel reflex to direct LDL Fasting  - Comprehensive metabolic panel    3. Fatty liver    - Comprehensive metabolic panel    4. Hypertension goal BP (blood pressure) < 140/90    - Comprehensive metabolic panel  - Albumin Random Urine Quantitative with Creat Ratio    Joel Wegener,MD

## 2018-01-09 NOTE — MR AVS SNAPSHOT
After Visit Summary   1/9/2018    Fabrice Donahue    MRN: [id number]           Patient Information     Date Of Birth          1965        Visit Information        Provider Department      1/9/2018 8:00 AM Wegener, Joel Daniel Irwin, MD [facility]        Today's Diagnoses     Sebaceous cyst    -  1    Hyperlipidemia LDL goal <130        Fatty liver        Hypertension goal BP (blood pressure) < 140/90           Follow-ups after your visit        Who to contact     If you have questions or need follow up information about today's clinic visit or your schedule please contact [facility] directly at [phone].  Normal or non-critical lab and imaging results will be communicated to you by MyChart, letter or phone within 4 business days after the clinic has received the results. If you do not hear from us within 7 days, please contact the clinic through [employer] or phone. If you have a critical or abnormal lab result, we will notify you by phone as soon as possible.  Submit refill requests through [employer] or call your pharmacy and they will forward the refill request to us. Please allow 3 business days for your refill to be completed.          Additional Information About Your Visit        MyChart Information     [employer] gives you secure access to your electronic health record. If you see a primary care provider, you can also send messages to your care team and make appointments. If you have questions, please call your primary care clinic.  If you do not have a primary care provider, please call [phone] and they will assist you.        Care EveryWhere ID     This is your Care EveryWhere ID. This could be used by other organizations to access your [place] medical records  [id number]        Your Vitals Were     Pulse Temperature Respirations Pulse Oximetry BMI (Body Mass Index)       74 97.8  F (36.6  C) (Oral) 23 94% 28.64 kg/m2        Blood Pressure from Last 3  Encounters:   01/09/18 110/80   12/18/17 (!) 160/116   02/28/17 128/84    Weight from Last 3 Encounters:   01/09/18 187 lb (84.8 kg)   12/18/17 189 lb (85.7 kg)   02/28/17 172 lb 8 oz (78.2 kg)              We Performed the Following     Albumin Random Urine Quantitative with Creat Ratio     Comprehensive metabolic panel     EXC BENIGN SKIN LESION TRUNK/ARM/LEG 0.6-1.0 CM     Lipid panel reflex to direct LDL Fasting        Primary Care Provider Office Phone # Fax #    Cristopher Daniel Irwin Wegener, [phone]       [address]  [facility] [address]        Equal Access to Services     KUMAR CAMPA : Hadii isis hoffman hadxiaoo Socintia, waaxda luqadaha, qaybta kaalmada adeegyada, grace barnes. So [facility] [phone].    ATENCIÓN: Si habla español, tiene a ayala disposición servicios gratuitos de asistencia lingüística. Llame al [phone].    We comply with applicable federal civil rights laws and [place] laws. We do not discriminate on the basis of race, color, national origin, age, disability, sex, sexual orientation, or gender identity.            Thank you!     Thank you for choosing [facility]  for your care. Our goal is always to provide you with excellent care. Hearing back from our patients is one way we can continue to improve our services. Please take a few minutes to complete the written survey that you may receive in the mail after your visit with us. Thank you!             Your Updated Medication List - Protect others around you: Learn how to safely use, store and throw away your medicines at www.disposemymeds.org.          This list is accurate as of: 1/9/18  9:44 AM.  Always use your most recent med list.                   Brand Name Dispense Instructions for use Diagnosis    fish oil-omega-3 fatty acids 1000 MG capsule     180 capsule    Take 2 capsules by mouth daily.        lisinopril 40 MG tablet    PRINIVIL/ZESTRIL    90 tablet    Take 1 tablet  (40 mg) by mouth daily    Hypertension goal BP (blood pressure) < 140/90       Multi-vitamin Tabs tablet   Generic drug:  multivitamin, therapeutic with minerals     100 tablet    Take 1 tablet by mouth daily.        pravastatin 20 MG tablet    PRAVACHOL    30 tablet    TAKE 1 TABLET(20 MG) BY MOUTH DAILY    Hyperlipidemia LDL goal <130

## 2018-05-11 ENCOUNTER — OFFICE VISIT (OUTPATIENT)
Dept: FAMILY MEDICINE | Facility: CLINIC | Age: 53
End: 2018-05-11
Payer: COMMERCIAL

## 2018-05-11 VITALS
HEART RATE: 78 BPM | RESPIRATION RATE: 18 BRPM | OXYGEN SATURATION: 99 % | BODY MASS INDEX: 27.43 KG/M2 | SYSTOLIC BLOOD PRESSURE: 126 MMHG | DIASTOLIC BLOOD PRESSURE: 85 MMHG | HEIGHT: 68 IN | TEMPERATURE: 97.3 F | WEIGHT: 181 LBS

## 2018-05-11 DIAGNOSIS — E78.5 HYPERLIPIDEMIA LDL GOAL <130: ICD-10-CM

## 2018-05-11 DIAGNOSIS — K76.0 FATTY LIVER: ICD-10-CM

## 2018-05-11 DIAGNOSIS — I10 HYPERTENSION GOAL BP (BLOOD PRESSURE) < 140/90: ICD-10-CM

## 2018-05-11 DIAGNOSIS — Z00.00 ROUTINE GENERAL MEDICAL EXAMINATION AT A HEALTH CARE FACILITY: Primary | ICD-10-CM

## 2018-05-11 PROCEDURE — 99396 PREV VISIT EST AGE 40-64: CPT | Performed by: FAMILY MEDICINE

## 2018-05-11 NOTE — MR AVS SNAPSHOT
After Visit Summary   5/11/2018    Fabrice Donahue    MRN: [id number]           Patient Information     Date Of Birth          1965        Visit Information        Provider Department      5/11/2018 3:00 PM Wegener, Joel Daniel Irwin, MD [facility]        Today's Diagnoses     Routine general medical examination at a health care facility    -  1    Hypertension goal BP (blood pressure) < 140/90        Hyperlipidemia LDL goal <130        Fatty liver          Care Instructions      Preventive Health Recommendations  Male Ages 50 - 64    Yearly exam:             See your health care provider every year in order to  o   Review health changes.   o   Discuss preventive care.    o   Review your medicines if your doctor has prescribed any.     Have a cholesterol test every 5 years, or more frequently if you are at risk for high cholesterol/heart disease.     Have a diabetes test (fasting glucose) every three years. If you are at risk for diabetes, you should have this test more often.     Have a colonoscopy at age 50, or have a yearly FIT test (stool test). These exams will check for colon cancer.      Talk with your health care provider about whether or not a prostate cancer screening test (PSA) is right for you.    You should be tested each year for STDs (sexually transmitted diseases), if you re at risk.     Shots: Get a flu shot each year. Get a tetanus shot every 10 years.     Nutrition:    Eat at least 5 servings of fruits and vegetables daily.     Eat whole-grain bread, whole-wheat pasta and brown rice instead of white grains and rice.     Talk to your provider about Calcium and Vitamin D.     Lifestyle    Exercise for at least 150 minutes a week (30 minutes a day, 5 days a week). This will help you control your weight and prevent disease.     Limit alcohol to one drink per day.     No smoking.     Wear sunscreen to prevent skin cancer.     See your dentist every six months for an exam  and cleaning.     See your eye doctor every 1 to 2 years.      Preventive Health Recommendations  Male Ages 50 - 64    Yearly exam:             See your health care provider every year in order to  o   Review health changes.   o   Discuss preventive care.    o   Review your medicines if your doctor has prescribed any.     Have a cholesterol test every 5 years, or more frequently if you are at risk for high cholesterol/heart disease.     Have a diabetes test (fasting glucose) every three years. If you are at risk for diabetes, you should have this test more often.     Have a colonoscopy at age 50, or have a yearly FIT test (stool test). These exams will check for colon cancer.      Talk with your health care provider about whether or not a prostate cancer screening test (PSA) is right for you.    You should be tested each year for STDs (sexually transmitted diseases), if you re at risk.     Shots: Get a flu shot each year. Get a tetanus shot every 10 years.     Nutrition:    Eat at least 5 servings of fruits and vegetables daily.     Eat whole-grain bread, whole-wheat pasta and brown rice instead of white grains and rice.     Talk to your provider about Calcium and Vitamin D.     Lifestyle    Exercise for at least 150 minutes a week (30 minutes a day, 5 days a week). This will help you control your weight and prevent disease.     Limit alcohol to one drink per day.     No smoking.     Wear sunscreen to prevent skin cancer.     See your dentist every six months for an exam and cleaning.     See your eye doctor every 1 to 2 years.            Follow-ups after your visit        Who to contact     If you have questions or need follow up information about today's clinic visit or your schedule please contact [facility] directly at [phone].  Normal or non-critical lab and imaging results will be communicated to you by MyChart, letter or phone within 4 business days after the clinic has received the  "results. If you do not hear from us within 7 days, please contact the clinic through [employer] or phone. If you have a critical or abnormal lab result, we will notify you by phone as soon as possible.  Submit refill requests through [employer] or call your pharmacy and they will forward the refill request to us. Please allow 3 business days for your refill to be completed.          Additional Information About Your Visit        [employer] Information     [employer] gives you secure access to your electronic health record. If you see a primary care provider, you can also send messages to your care team and make appointments. If you have questions, please call your primary care clinic.  If you do not have a primary care provider, please call [phone] and they will assist you.        Care EveryWhere ID     This is your Care EveryWhere ID. This could be used by other organizations to access your [place] medical records  [id number]        Your Vitals Were     Pulse Temperature Respirations Height Pulse Oximetry BMI (Body Mass Index)    78 97.3  F (36.3  C) (Oral) 18 5' 7.75\" (1.721 m) 99% 27.72 kg/m2       Blood Pressure from Last 3 Encounters:   05/11/18 126/85   01/09/18 110/80   12/18/17 (!) 160/116    Weight from Last 3 Encounters:   05/11/18 181 lb (82.1 kg)   01/09/18 187 lb (84.8 kg)   12/18/17 189 lb (85.7 kg)              Today, you had the following     No orders found for display       Primary Care Provider Office Phone # Fax #    Joel Daniel Irwin Wegener, [phone] [address] [facility] [address]        Equal Access to Services     [facility] HospitalALEIDA : Hadjonnathan Minaya, grace lora. So [facility] [phone].    ATENCIÓN: Si habla español, tiene a ayala disposición servicios gratuitos de asistencia lingüística. Lona al [phone].    We comply with applicable federal civil rights laws and [place] laws. We " do not discriminate on the basis of race, color, national origin, age, disability, sex, sexual orientation, or gender identity.            Thank you!     Thank you for choosing [facility]  for your care. Our goal is always to provide you with excellent care. Hearing back from our patients is one way we can continue to improve our services. Please take a few minutes to complete the written survey that you may receive in the mail after your visit with us. Thank you!             Your Updated Medication List - Protect others around you: Learn how to safely use, store and throw away your medicines at www.disposemymeds.org.          This list is accurate as of 5/11/18  3:16 PM.  Always use your most recent med list.                   Brand Name Dispense Instructions for use Diagnosis    fish oil-omega-3 fatty acids 1000 MG capsule     180 capsule    Take 2 capsules by mouth daily.        lisinopril 40 MG tablet    PRINIVIL/ZESTRIL    90 tablet    Take 1 tablet (40 mg) by mouth daily    Hypertension goal BP (blood pressure) < 140/90       Multi-vitamin Tabs tablet   Generic drug:  multivitamin, therapeutic with minerals     100 tablet    Take 1 tablet by mouth daily.        pravastatin 20 MG tablet    PRAVACHOL    90 tablet    TAKE 1 TABLET(20 MG) BY MOUTH DAILY    Hyperlipidemia LDL goal <130

## 2018-05-11 NOTE — PATIENT INSTRUCTIONS
Preventive Health Recommendations  Male Ages 50   64    Yearly exam:             See your health care provider every year in order to  o   Review health changes.   o   Discuss preventive care.    o   Review your medicines if your doctor has prescribed any.     Have a cholesterol test every 5 years, or more frequently if you are at risk for high cholesterol/heart disease.     Have a diabetes test (fasting glucose) every three years. If you are at risk for diabetes, you should have this test more often.     Have a colonoscopy at age 50, or have a yearly FIT test (stool test). These exams will check for colon cancer.      Talk with your health care provider about whether or not a prostate cancer screening test (PSA) is right for you.    You should be tested each year for STDs (sexually transmitted diseases), if you re at risk.     Shots: Get a flu shot each year. Get a tetanus shot every 10 years.     Nutrition:    Eat at least 5 servings of fruits and vegetables daily.     Eat whole-grain bread, whole-wheat pasta and brown rice instead of white grains and rice.     Talk to your provider about Calcium and Vitamin D.     Lifestyle    Exercise for at least 150 minutes a week (30 minutes a day, 5 days a week). This will help you control your weight and prevent disease.     Limit alcohol to one drink per day.     No smoking.     Wear sunscreen to prevent skin cancer.     See your dentist every six months for an exam and cleaning.     See your eye doctor every 1 to 2 years.      Preventive Health Recommendations  Male Ages 50   64    Yearly exam:             See your health care provider every year in order to  o   Review health changes.   o   Discuss preventive care.    o   Review your medicines if your doctor has prescribed any.     Have a cholesterol test every 5 years, or more frequently if you are at risk for high cholesterol/heart disease.     Have a diabetes test (fasting glucose) every three years. If you are at  risk for diabetes, you should have this test more often.     Have a colonoscopy at age 50, or have a yearly FIT test (stool test). These exams will check for colon cancer.      Talk with your health care provider about whether or not a prostate cancer screening test (PSA) is right for you.    You should be tested each year for STDs (sexually transmitted diseases), if you re at risk.     Shots: Get a flu shot each year. Get a tetanus shot every 10 years.     Nutrition:    Eat at least 5 servings of fruits and vegetables daily.     Eat whole-grain bread, whole-wheat pasta and brown rice instead of white grains and rice.     Talk to your provider about Calcium and Vitamin D.     Lifestyle    Exercise for at least 150 minutes a week (30 minutes a day, 5 days a week). This will help you control your weight and prevent disease.     Limit alcohol to one drink per day.     No smoking.     Wear sunscreen to prevent skin cancer.     See your dentist every six months for an exam and cleaning.     See your eye doctor every 1 to 2 years.

## 2018-05-11 NOTE — PROGRESS NOTES
SUBJECTIVE:   CC: Fabrice Donahue is an 52 year old male who presents for preventative health visit.     Physical   Annual:     Getting at least 3 servings of Calcium per day::  Yes    Bi-annual eye exam::  Yes    Dental care twice a year::  Yes    Sleep apnea or symptoms of sleep apnea::  None    Diet::  Regular (no restrictions) and Breakfast skipped    Frequency of exercise::  4-5 days/week    Duration of exercise::  30-45 minutes    Taking medications regularly::  Yes    Medication side effects::  Lightheadedness    Additional concerns today::  No                -------------------------------------    Today's PHQ-2 Score:   PHQ-2 ( 1999 Pf[employer]) 5/8/2018   Q1: Little interest or pleasure in doing things 0   Q2: Feeling down, depressed or hopeless 0   PHQ-2 Score 0   Q1: Little interest or pleasure in doing things Not at all   Q2: Feeling down, depressed or hopeless Not at all   PHQ-2 Score 0       Abuse: Current or Past(Physical, Sexual or Emotional)- No  Do you feel safe in your environment - Yes    Social History   Substance Use Topics     Smoking status: Former Smoker     Packs/day: 0.50     Years: 5.00     Types: Cigarettes     Quit date: 10/1/2010     Smokeless tobacco: Never Used     Alcohol use Yes      Comment: Less than 1 drink a week     Alcohol Use 5/8/2018   If you drink alcohol do you typically have greater than 3 drinks per day OR greater than 7 drinks per week? No       Last PSA:   PSA   Date Value Ref Range Status   02/17/2015 0.78 0 - 4 ug/L Final       Reviewed orders with patient. Reviewed health maintenance and updated orders accordingly - Yes  Labs reviewed in EPIC    Reviewed and updated as needed this visit by clinical staff  Tobacco  Allergies  Meds  Med Hx  Surg Hx  Fam Hx  Soc Hx        Reviewed and updated as needed this visit by Provider  Tobacco  Meds        Past Medical History:   Diagnosis Date     Benign neoplasm of skin of other and unspecified parts of face     atypical  "nevus     Essential hypertension, benign      Lyme disease 8/2007    hot welts, bull eye bruise     Mixed hyperlipidemia      Other chronic nonalcoholic liver disease 2006    Fatty liver     Shoulder pain, right     post clavicular fx     Syncope 7/08    neg Head/Neck CT     Unspecified hemorrhoids without mention of complication       Past Surgical History:   Procedure Laterality Date     C ECHO HEART XTHORACIC,COMPLETE, W/O DOPPLER  8/08    Nl     COLONOSCOPY  04/2014     HC US ABDOMEN COMPLETE  6/2006    fatty liver     HERNIA REPAIR  1984     SURGICAL HISTORY OF -   1982    L Inguinal Hernia     SURGICAL HISTORY OF -   8/09    R Clavicle Fracture Repair       Review of Systems  C: NEGATIVE for fever, chills, change in weight  I: NEGATIVE for worrisome rashes, moles or lesions  E: NEGATIVE for vision changes or irritation  ENT: NEGATIVE for ear, mouth and throat problems  R: NEGATIVE for significant cough or SOB  CV: NEGATIVE for chest pain, palpitations or peripheral edema  GI: NEGATIVE for nausea, abdominal pain, heartburn, or change in bowel habits   male: negative for dysuria, hematuria, decreased urinary stream, erectile dysfunction, urethral discharge  M: NEGATIVE for significant arthralgias or myalgia  N: NEGATIVE for weakness, dizziness or paresthesias  P: NEGATIVE for changes in mood or affect    OBJECTIVE:   /85  Pulse 78  Temp 97.3  F (36.3  C) (Oral)  Resp 18  Ht 5' 7.75\" (1.721 m)  Wt 181 lb (82.1 kg)  SpO2 99%  BMI 27.72 kg/m2    Physical Exam  GENERAL: healthy, alert and no distress  EYES: Eyes grossly normal to inspection, PERRL and conjunctivae and sclerae normal  HENT: ear canals and TM's normal, nose and mouth without ulcers or lesions  NECK: no adenopathy, no asymmetry, masses, or scars and thyroid normal to palpation  RESP: lungs clear to auscultation - no rales, rhonchi or wheezes  CV: regular rate and rhythm, normal S1 S2, no S3 or S4, no murmur, click or rub, no peripheral " "edema and peripheral pulses strong  ABDOMEN: soft, nontender, no hepatosplenomegaly, no masses and bowel sounds normal   (male): normal male genitalia without lesions or urethral discharge, no hernia  RECTAL: normal sphincter tone, no rectal masses, prostate normal size, smooth, nontender without nodules or masses  MS: no gross musculoskeletal defects noted, no edema  SKIN: no suspicious lesions or rashes  NEURO: Normal strength and tone, mentation intact and speech normal  PSYCH: mentation appears normal, affect normal/bright    ASSESSMENT/PLAN:   ASSESSMENT AND PLAN  1. Routine general medical examination at a health care facility  Will look into coverage for shingrix.  Immunizations and colonoscopy upto date.     Does endurance bike riding up to about 100 miles/time.      If doing a race, very heavy exertion I recommend not taking ibuprofen for 48 hours before or during and to skip taking atorvastatin for 3-4 days before to avoid rhabdomyolysis and kidney failure.     2. Hypertension goal BP (blood pressure) < 140/90  Controlled. Continue lisinopril 40mg.     3. Hyperlipidemia LDL goal <130  The 10-year ASCVD risk score (Fei DC Jr, et al., 2013) is: 9%    Values used to calculate the score:      Age: 52 years      Sex: Male      Is Non- : No      Diabetic: No      Tobacco smoker: No      Systolic Blood Pressure: 126 mmHg      Is BP treated: Yes      HDL Cholesterol: 33 mg/dL      Total Cholesterol: 211 mg/dL    No dose change to statin.     4. Fatty liver  Recent lfts normal.         MYCHART FOR ON-LINE CARE(VISITS), LABS, REFILLS, MESSAGING, ETC [email or web address] , [phone]    E-VISIT: click \"on-line care, then request e-visit\".  E-visits work well for following up on issues we have discussed in clinic previously which may need new prescriptions, new prescriptions or substantial discussion. These are always done by me (Dr. Wegener).     ONCARE VISIT:  " "[email or web address]  - we treat nearly 50 common conditions through on-care.  These are done in an hour by on-call staff.     RADIOLOGY:  [facility]:  [phone]   [facility]: [phone]    Mammogram and Colonoscopy [phone]    Smoking Cessation: [email or web address], 1-836-033-PLAN (8981)      CONSUMER PRICE LINE for estimates of test costs:  [phone]         COUNSELING:   Reviewed preventive health counseling, as reflected in patient instructions       Regular exercise       Healthy diet/nutrition       Colon cancer screening       Prostate cancer screening         reports that he quit smoking about 7 years ago. His smoking use included Cigarettes. He has a 2.50 pack-year smoking history. He has never used smokeless tobacco.    Estimated body mass index is 27.72 kg/(m^2) as calculated from the following:    Height as of this encounter: 5' 7.75\" (1.721 m).    Weight as of this encounter: 181 lb (82.1 kg).   Weight management plan: Discussed healthy diet and exercise guidelines and patient will follow up in 12 months in clinic to re-evaluate.    Counseling Resources:  ATP IV Guidelines  Pooled Cohorts Equation Calculator  FRAX Risk Assessment  ICSI Preventive Guidelines  Dietary Guidelines for Americans,   USDA's MyPlate  ASA Prophylaxis  Lung CA Screening    Joel Daniel Wegener, MD  [facility]  Answers for HPI/ROS submitted by the patient on 2018   PHQ-2 Score: 0    "

## 2018-08-10 ENCOUNTER — ALLIED HEALTH/NURSE VISIT (OUTPATIENT)
Dept: NURSING | Facility: CLINIC | Age: 53
End: 2018-08-10
Payer: COMMERCIAL

## 2018-08-10 DIAGNOSIS — Z23 ENCOUNTER FOR IMMUNIZATION: Primary | ICD-10-CM

## 2018-08-10 PROCEDURE — 90471 IMMUNIZATION ADMIN: CPT

## 2018-08-10 PROCEDURE — 90750 HZV VACC RECOMBINANT IM: CPT

## 2018-08-10 PROCEDURE — 99207 ZZC NO CHARGE NURSE ONLY: CPT

## 2018-08-10 NOTE — NURSING NOTE
Screening Questionnaire for Adult Immunization    Are you sick today?   No   Do you have allergies to medications, food, a vaccine component or latex?   No   Have you ever had a serious reaction after receiving a vaccination?   No   Do you have a long-term health problem with heart disease, lung disease, asthma, kidney disease, metabolic disease (e.g. diabetes), anemia, or other blood disorder?   No   Do you have cancer, leukemia, HIV/AIDS, or any other immune system problem?   No   In the past 3 months, have you taken medications that affect  your immune system, such as prednisone, other steroids, or anticancer drugs; drugs for the treatment of rheumatoid arthritis, Crohn s disease, or psoriasis; or have you had radiation treatments?   No   Have you had a seizure, or a brain or other nervous system problem?   No   During the past year, have you received a transfusion of blood or blood     products, or been given immune (gamma) globulin or antiviral drug?   No   For women: Are you pregnant or is there a chance you could become        pregnant during the next month?   No   Have you received any vaccinations in the past 4 weeks?   No     Immunization questionnaire answers were all negative.        Per orders of Dr. Washington, injection of Shingrix given by Hetal Quintero. Patient instructed to remain in clinic for 15 minutes afterwards, and to report any adverse reaction to me immediately.    Due to injection administration, patient instructed to remain in clinic for 15 minutes  afterwards, and to report any adverse reaction to me immediately.       Screening performed by Hetal Quintero on 8/10/2018 at 2:40 PM.

## 2018-08-10 NOTE — MR AVS SNAPSHOT
After Visit Summary   8/10/2018    Fabrice Donahue    MRN: [id number]           Patient Information     Date Of Birth          1965        Visit Information        Provider Department      8/10/2018 3:00 PM [facility] MEDICAL ASSISTANT [facility]        Today's Diagnoses     Encounter for immunization    -  1       Follow-ups after your visit        Who to contact     If you have questions or need follow up information about today's clinic visit or your schedule please contact [facility] directly at [phone].  Normal or non-critical lab and imaging results will be communicated to you by [employer] Space Technologyhart, letter or phone within 4 business days after the clinic has received the results. If you do not hear from us within 7 days, please contact the clinic through CentrePatht or phone. If you have a critical or abnormal lab result, we will notify you by phone as soon as possible.  Submit refill requests through Hy[employer] or call your pharmacy and they will forward the refill request to us. Please allow 3 business days for your refill to be completed.          Additional Information About Your Visit        MyChart Information     [employer] gives you secure access to your electronic health record. If you see a primary care provider, you can also send messages to your care team and make appointments. If you have questions, please call your primary care clinic.  If you do not have a primary care provider, please call [phone] and they will assist you.        Care EveryWhere ID     This is your Care EveryWhere ID. This could be used by other organizations to access your [facility] medical records  [id number]         Blood Pressure from Last 3 Encounters:   05/11/18 126/85   01/09/18 110/80   12/18/17 (!) 160/116    Weight from Last 3 Encounters:   05/11/18 181 lb (82.1 kg)   01/09/18 187 lb (84.8 kg)   12/18/17 189 lb (85.7 kg)              We Performed the Following     ADMIN 1st VACCINE      ZOSTER VACCINE RECOMBINANT ADJUVANTED IM NJX        Primary Care Provider Office Phone # Fax #    Cristopher Daniel Irwin Wegener, [phone] [address] [place] [address]        Equal Access to Services     KUMAR CAMPA : Hadii isis ku hadxiaoo Soomaali, waaxda luqadaha, qaybta kaalmada adeegyada, waxtay powersn adeyann ann laCinthyaronda barnes. So [facility] [phone].    ATENCIÓN: Si habla español, tiene a ayala disposición servicios gratuitos de asistencia lingüística. [facility] [phone].    We comply with applicable federal civil rights laws and [place] laws. We do not discriminate on the basis of race, color, national origin, age, disability, sex, sexual orientation, or gender identity.            Thank you!     Thank you for choosing [facility]  for your care. Our goal is always to provide you with excellent care. Hearing back from our patients is one way we can continue to improve our services. Please take a few minutes to complete the written survey that you may receive in the mail after your visit with us. Thank you!             Your Updated Medication List - Protect others around you: Learn how to safely use, store and throw away your medicines at www.disposemymeds.org.          This list is accurate as of 8/10/18  3:14 PM.  Always use your most recent med list.                   Brand Name Dispense Instructions for use Diagnosis    fish oil-omega-3 fatty acids 1000 MG capsule     180 capsule    Take 2 capsules by mouth daily.        lisinopril 40 MG tablet    PRINIVIL/ZESTRIL    90 tablet    Take 1 tablet (40 mg) by mouth daily    Hypertension goal BP (blood pressure) < 140/90       Multi-vitamin Tabs tablet   Generic drug:  multivitamin, therapeutic with minerals     100 tablet    Take 1 tablet by mouth daily.        pravastatin 20 MG tablet    PRAVACHOL    90 tablet    TAKE 1 TABLET(20 MG) BY MOUTH DAILY    Hyperlipidemia LDL goal <130

## 2018-11-05 ENCOUNTER — ALLIED HEALTH/NURSE VISIT (OUTPATIENT)
Dept: NURSING | Facility: CLINIC | Age: 53
End: 2018-11-05
Payer: COMMERCIAL

## 2018-11-05 DIAGNOSIS — Z23 NEED FOR VACCINATION: Primary | ICD-10-CM

## 2018-11-05 PROCEDURE — 90471 IMMUNIZATION ADMIN: CPT

## 2018-11-05 PROCEDURE — 90750 HZV VACC RECOMBINANT IM: CPT

## 2018-11-05 NOTE — MR AVS SNAPSHOT
After Visit Summary   11/5/2018    Fabrice Donahue    MRN: [id number]           Patient Information     Date Of Birth          1965        Visit Information        Provider Department      11/5/2018 3:00 PM HP MEDICAL ASSISTANT [facility]        Today's Diagnoses     Need for vaccination    -  1       Follow-ups after your visit        Who to contact     If you have questions or need follow up information about today's clinic visit or your schedule please contact [facility] directly at [phone].  Normal or non-critical lab and imaging results will be communicated to you by CybEyehart, letter or phone within 4 business days after the clinic has received the results. If you do not hear from us within 7 days, please contact the clinic through mokonot or phone. If you have a critical or abnormal lab result, we will notify you by phone as soon as possible.  Submit refill requests through [employer] or call your pharmacy and they will forward the refill request to us. Please allow 3 business days for your refill to be completed.          Additional Information About Your Visit        MyChart Information     [employer] gives you secure access to your electronic health record. If you see a primary care provider, you can also send messages to your care team and make appointments. If you have questions, please call your primary care clinic.  If you do not have a primary care provider, please call [phone] and they will assist you.        Care EveryWhere ID     This is your Care EveryWhere ID. This could be used by other organizations to access your [place] medical records  [id number]         Blood Pressure from Last 3 Encounters:   05/11/18 126/85   01/09/18 110/80   12/18/17 (!) 160/116    Weight from Last 3 Encounters:   05/11/18 181 lb (82.1 kg)   01/09/18 187 lb (84.8 kg)   12/18/17 189 lb (85.7 kg)              We Performed the Following     ZOSTER VACCINE  RECOMBINANT ADJUVANTED IM NJX        Primary Care Provider Office Phone # Fax #    Cristopher Daniel Irwin Wegener, [phone] [address] [place] [address]        Equal Access to Services     KUMAR CAMPA : Hadii isis ku neetao Soomaali, waaxda luqadaha, qaybta kaalmada adeegyada, waxtay powersn kwasi ann laCinthyaronda barnes. So [facility] [phone].    ATENCIÓN: Si habla español, tiene a ayala disposición servicios gratuitos de asistencia lingüística. Llame al [phone].    We comply with applicable federal civil rights laws and [place] laws. We do not discriminate on the basis of race, color, national origin, age, disability, sex, sexual orientation, or gender identity.            Thank you!     Thank you for choosing [facility]  for your care. Our goal is always to provide you with excellent care. Hearing back from our patients is one way we can continue to improve our services. Please take a few minutes to complete the written survey that you may receive in the mail after your visit with us. Thank you!             Your Updated Medication List - Protect others around you: Learn how to safely use, store and throw away your medicines at www.disposemymeds.org.          This list is accurate as of 11/5/18  5:12 PM.  Always use your most recent med list.                   Brand Name Dispense Instructions for use Diagnosis    fish oil-omega-3 fatty acids 1000 MG capsule     180 capsule    Take 2 capsules by mouth daily.        lisinopril 40 MG tablet    PRINIVIL/ZESTRIL    90 tablet    Take 1 tablet (40 mg) by mouth daily    Hypertension goal BP (blood pressure) < 140/90       Multi-vitamin Tabs tablet   Generic drug:  multivitamin, therapeutic with minerals     100 tablet    Take 1 tablet by mouth daily.        pravastatin 20 MG tablet    PRAVACHOL    90 tablet    TAKE 1 TABLET(20 MG) BY MOUTH DAILY    Hyperlipidemia LDL goal <130

## 2018-11-05 NOTE — NURSING NOTE
Screening Questionnaire for Adult Immunization    Are you sick today?   No   Do you have allergies to medications, food, a vaccine component or latex?   No   Have you ever had a serious reaction after receiving a vaccination?   No   Do you have a long-term health problem with heart disease, lung disease, asthma, kidney disease, metabolic disease (e.g. diabetes), anemia, or other blood disorder?   No   Do you have cancer, leukemia, HIV/AIDS, or any other immune system problem?   No   In the past 3 months, have you taken medications that affect  your immune system, such as prednisone, other steroids, or anticancer drugs; drugs for the treatment of rheumatoid arthritis, Crohn s disease, or psoriasis; or have you had radiation treatments?   No   Have you had a seizure, or a brain or other nervous system problem?   No   During the past year, have you received a transfusion of blood or blood     products, or been given immune (gamma) globulin or antiviral drug?   No   For women: Are you pregnant or is there a chance you could become        pregnant during the next month?   No   Have you received any vaccinations in the past 4 weeks?   No     Immunization questionnaire answers were all negative.        Per orders of Dr. Ivan Quevedo , injection of Shingrix given by Francia Gary. Patient instructed to remain in clinic for 15 minutes afterwards, and to report any adverse reaction to me immediately.       Screening performed by Francia Gary on 11/5/2018 at 5:12 PM.

## 2018-12-19 DIAGNOSIS — I10 HYPERTENSION GOAL BP (BLOOD PRESSURE) < 140/90: ICD-10-CM

## 2018-12-20 NOTE — TELEPHONE ENCOUNTER
"Requested Prescriptions   Pending Prescriptions Disp Refills     lisinopril (PRINIVIL/ZESTRIL) 40 MG tablet [Pharmacy Med Name: LISINOPRIL 40MG TABLETS]  Last Written Prescription Date:  11/19/2018  Last Fill Quantity: 90 tablet,  # refills: 3   Last Office Visit: 5/11/2018   Future Office Visit:      90 tablet 0     Sig: TAKE 1 TABLET BY MOUTH EVERY DAY    ACE Inhibitors (Including Combos) Protocol Passed - 12/19/2018  6:34 PM       Passed - Blood pressure under 140/90 in past 12 months    BP Readings from Last 3 Encounters:   05/11/18 126/85   01/09/18 110/80   12/18/17 (!) 160/116          Passed - Recent (12 mo) or future (30 days) visit within the authorizing provider's specialty    Patient had office visit in the last 12 months or has a visit in the next 30 days with authorizing provider or within the authorizing provider's specialty.  See \"Patient Info\" tab in inbasket, or \"Choose Columns\" in Meds & Orders section of the refill encounter.           Passed - Patient is age 18 or older       Passed - Normal serum creatinine on file in past 12 months    Recent Labs   Lab Test 01/09/18 0928   CR 0.71          Passed - Normal serum potassium on file in past 12 months    Recent Labs   Lab Test 01/09/18 0928   POTASSIUM 4.3               "

## 2018-12-22 NOTE — TELEPHONE ENCOUNTER
Routing refill request to provider for review/approval because:  Medication is reported/historical      Per review of 11/18/18 MyChart encounter, patient advised by PCP to reduce to Lisinopril 20 mg daily.    This dose pended.    Dr. Wegener-Please sign if agree.    Team coordinators-Please ask patient to send Dr. Wegener updated BP readings since making dose change.    Thank you!  EMILY DysonN, RN

## 2018-12-23 RX ORDER — LISINOPRIL 40 MG/1
20 TABLET ORAL DAILY
Qty: 45 TABLET | Refills: 0 | Status: SHIPPED | OUTPATIENT
Start: 2018-12-23 | End: 2019-03-23

## 2019-01-16 DIAGNOSIS — E78.5 HYPERLIPIDEMIA LDL GOAL <130: ICD-10-CM

## 2019-01-17 NOTE — TELEPHONE ENCOUNTER
"Requested Prescriptions   Pending Prescriptions Disp Refills     pravastatin (PRAVACHOL) 20 MG tablet [Pharmacy Med Name: PRAVASTATIN 20MG TABLETS]  Last Written Prescription Date:  4/30/2018  Last Fill Quantity: 90 tablet,  # refills: 2   Last Office Visit: 5/11/2018   Future Office Visit:      90 tablet 0     Sig: TAKE 1 TABLET(20 MG) BY MOUTH DAILY    Statins Protocol Failed - 1/16/2019  5:47 PM       Failed - LDL on file in past 12 months    Recent Labs   Lab Test 01/09/18  0922   *          Passed - No abnormal creatine kinase in past 12 months    No lab results found.          Passed - Recent (12 mo) or future (30 days) visit within the authorizing provider's specialty    Patient had office visit in the last 12 months or has a visit in the next 30 days with authorizing provider or within the authorizing provider's specialty.  See \"Patient Info\" tab in inbasket, or \"Choose Columns\" in Meds & Orders section of the refill encounter.           Passed - Medication is active on med list       Passed - Patient is age 18 or older          "

## 2019-01-21 RX ORDER — PRAVASTATIN SODIUM 20 MG
TABLET ORAL
Qty: 30 TABLET | Refills: 0 | Status: SHIPPED | OUTPATIENT
Start: 2019-01-21 | End: 2019-02-14

## 2019-01-21 NOTE — TELEPHONE ENCOUNTER
Prescription approved per [facility] Refill Protocol.  Paula Flaherty RN    MYCHART reminder sent due for FLP.    Alcides CRESPO

## 2019-02-14 DIAGNOSIS — E78.5 HYPERLIPIDEMIA LDL GOAL <130: ICD-10-CM

## 2019-02-14 NOTE — LETTER
March 4, 2019      Fabrice Donahue  [address] [address]        Dear Fabrice,     In order to ensure we are providing the best quality care, we have reviewed your chart and see that you are due for:  1.   Fasting lab only appointment     Please call the clinic at your earliest convenience to schedule an appointment.    We greatly appreciate the opportunity to serve you.  Thank you for trusting us with your health care.    Your care team at [facility]        Sincerely,        Joel Daniel Wegener, MD

## 2019-02-15 NOTE — TELEPHONE ENCOUNTER
"Requested Prescriptions   Pending Prescriptions Disp Refills     pravastatin (PRAVACHOL) 20 MG tablet [Pharmacy Med Name: PRAVASTATIN 20MG TABLETS]  PATIENT NEEDS LABS.  Last Written Prescription Date:  1/21/19  Last Fill Quantity: 30 TABLET,  # refills: 0   Last office visit: 5/11/2018 with prescribing provider:  WEGENER   Future Office Visit:     30 tablet 0     Sig: TAKE 1 TABLET BY MOUTH DAILY    Statins Protocol Failed - 2/14/2019  6:18 PM       Failed - LDL on file in past 12 months    Recent Labs   Lab Test 01/09/18  0922   *            Passed - No abnormal creatine kinase in past 12 months    No lab results found.            Passed - Recent (12 mo) or future (30 days) visit within the authorizing provider's specialty    Patient had office visit in the last 12 months or has a visit in the next 30 days with authorizing provider or within the authorizing provider's specialty.  See \"Patient Info\" tab in inbasket, or \"Choose Columns\" in Meds & Orders section of the refill encounter.             Passed - Medication is active on med list       Passed - Patient is age 18 or older          "

## 2019-02-18 NOTE — TELEPHONE ENCOUNTER
Routing refill request to provider for review/approval because:  Chitra given x1 and patient did not follow up, please advise    Routing to HW Reception - please ask patient to schedule fasting lab only and bridge can be discussed with provider    Thank you,  Reji Hoffman RN

## 2019-03-05 RX ORDER — PRAVASTATIN SODIUM 20 MG
TABLET ORAL
Qty: 15 TABLET | Refills: 0 | Status: SHIPPED | OUTPATIENT
Start: 2019-03-05 | End: 2019-03-25

## 2019-03-11 DIAGNOSIS — E78.5 HYPERLIPIDEMIA LDL GOAL <130: ICD-10-CM

## 2019-03-11 PROCEDURE — 36415 COLL VENOUS BLD VENIPUNCTURE: CPT | Performed by: FAMILY MEDICINE

## 2019-03-11 PROCEDURE — 83721 ASSAY OF BLOOD LIPOPROTEIN: CPT | Performed by: FAMILY MEDICINE

## 2019-03-11 PROCEDURE — 80061 LIPID PANEL: CPT | Performed by: FAMILY MEDICINE

## 2019-03-12 LAB
CHOLEST SERPL-MCNC: 181 MG/DL
HDLC SERPL-MCNC: 22 MG/DL
LDLC SERPL CALC-MCNC: ABNORMAL MG/DL
LDLC SERPL DIRECT ASSAY-MCNC: 89 MG/DL
NONHDLC SERPL-MCNC: 159 MG/DL
TRIGL SERPL-MCNC: 434 MG/DL

## 2019-03-23 ENCOUNTER — MYC MEDICAL ADVICE (OUTPATIENT)
Dept: FAMILY MEDICINE | Facility: CLINIC | Age: 54
End: 2019-03-23

## 2019-03-23 DIAGNOSIS — Z51.81 MEDICATION MONITORING ENCOUNTER: Primary | ICD-10-CM

## 2019-03-23 DIAGNOSIS — I10 HYPERTENSION GOAL BP (BLOOD PRESSURE) < 140/90: ICD-10-CM

## 2019-03-23 DIAGNOSIS — E78.5 HYPERLIPIDEMIA LDL GOAL <130: ICD-10-CM

## 2019-03-25 RX ORDER — PRAVASTATIN SODIUM 20 MG
20 TABLET ORAL DAILY
Qty: 90 TABLET | Refills: 3 | Status: SHIPPED | OUTPATIENT
Start: 2019-03-25 | End: 2019-04-19

## 2019-03-25 NOTE — TELEPHONE ENCOUNTER
Dr. Wegener-Please advise if an alternative or different dose of Pravastatin is recommended for patient?    Pharmacy pended.    Thank you!  YELENA Carmichael, EMILYN, RN

## 2019-03-26 DIAGNOSIS — Z51.81 MEDICATION MONITORING ENCOUNTER: ICD-10-CM

## 2019-03-26 PROCEDURE — 84132 ASSAY OF SERUM POTASSIUM: CPT | Performed by: FAMILY MEDICINE

## 2019-03-26 PROCEDURE — 36415 COLL VENOUS BLD VENIPUNCTURE: CPT | Performed by: FAMILY MEDICINE

## 2019-03-26 PROCEDURE — 82565 ASSAY OF CREATININE: CPT | Performed by: FAMILY MEDICINE

## 2019-03-26 RX ORDER — LISINOPRIL 40 MG/1
TABLET ORAL
Qty: 15 TABLET | Refills: 0 | Status: SHIPPED | OUTPATIENT
Start: 2019-03-26 | End: 2019-04-19

## 2019-03-26 NOTE — TELEPHONE ENCOUNTER
Did you want to put in an order for future lipid panel or just order the day you see patient?  Susan Kaplan RN

## 2019-03-26 NOTE — TELEPHONE ENCOUNTER
Yes go ahead and do the non-fasting labs today and we can repeat the lipids at his physical.   '  Joel Wegener,MD

## 2019-03-26 NOTE — TELEPHONE ENCOUNTER
"One month refill only as patient overdue for creatinine and potassium labs.    Labs ordered.    Team coordinators-Please contact patient to schedule.    Thank you!  EMILY DysonN, RN        Requested Prescriptions   Pending Prescriptions Disp Refills     lisinopril (PRINIVIL/ZESTRIL) 40 MG tablet [Pharmacy Med Name: LISINOPRIL 40MG TABLETS] 45 tablet 0     Sig: TAKE 1/2 TABLET BY MOUTH DAILY    ACE Inhibitors (Including Combos) Protocol Failed - 3/23/2019  4:36 PM       Failed - Normal serum creatinine on file in past 12 months    Recent Labs   Lab Test 01/09/18  0928   CR 0.71            Failed - Normal serum potassium on file in past 12 months    Recent Labs   Lab Test 01/09/18  0928   POTASSIUM 4.3            Passed - Blood pressure under 140/90 in past 12 months    BP Readings from Last 3 Encounters:   05/11/18 126/85   01/09/18 110/80   12/18/17 (!) 160/116                Passed - Recent (12 mo) or future (30 days) visit within the authorizing provider's specialty    Patient had office visit in the last 12 months or has a visit in the next 30 days with authorizing provider or within the authorizing provider's specialty.  See \"Patient Info\" tab in inbasket, or \"Choose Columns\" in Meds & Orders section of the refill encounter.             Passed - Medication is active on med list       Passed - Patient is age 18 or older          "

## 2019-03-26 NOTE — TELEPHONE ENCOUNTER
SHould he et the non-fasting labs today and repeat the lipid when seen for his annual?  Susan Kaplan RN

## 2019-03-27 LAB
CREAT SERPL-MCNC: 0.7 MG/DL (ref 0.66–1.25)
GFR SERPL CREATININE-BSD FRML MDRD: >90 ML/MIN/{1.73_M2}
POTASSIUM SERPL-SCNC: 4.1 MMOL/L (ref 3.4–5.3)

## 2019-04-12 ENCOUNTER — OFFICE VISIT (OUTPATIENT)
Dept: PEDIATRICS | Facility: CLINIC | Age: 54
End: 2019-04-12
Payer: COMMERCIAL

## 2019-04-12 VITALS
WEIGHT: 183.4 LBS | DIASTOLIC BLOOD PRESSURE: 82 MMHG | HEART RATE: 76 BPM | SYSTOLIC BLOOD PRESSURE: 124 MMHG | BODY MASS INDEX: 27.8 KG/M2 | OXYGEN SATURATION: 95 % | TEMPERATURE: 98 F | HEIGHT: 68 IN

## 2019-04-12 DIAGNOSIS — M25.531 RIGHT WRIST PAIN: ICD-10-CM

## 2019-04-12 DIAGNOSIS — S23.9XXA THORACIC BACK SPRAIN, INITIAL ENCOUNTER: ICD-10-CM
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
## 2019-04-12 PROCEDURE — 99213 OFFICE O/P EST LOW 20 MIN: CPT | Performed by: INTERNAL MEDICINE

## 2019-04-12 ASSESSMENT — MIFFLIN-ST. JEOR: SCORE: 1637.43
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
## 2019-04-12 NOTE — PATIENT INSTRUCTIONS
With respect to your back:    Use tylenol or ibuprofen as needed; may use heat and massage as well.    If symptoms worsen, we could try flexeril (cyclobenzaprine) as needed, but this can cause drowsiness.      With respect to wrist: use ice three times daily and ibuprofen or tylenol.    Cosmo Ewing MD  Internal Medicine and Pediatrics

## 2019-04-13 ASSESSMENT — ENCOUNTER SYMPTOMS
NAUSEA: 0
CONSTIPATION: 0
PALPITATIONS: 0
HEADACHES: 0
SORE THROAT: 0
HEMATURIA: 0
COUGH: 0
DIZZINESS: 0
HEMATOCHEZIA: 0
HEARTBURN: 0
DYSURIA: 0
FREQUENCY: 0
ARTHRALGIAS: 0
ABDOMINAL PAIN: 0
WEAKNESS: 0
EYE PAIN: 0
NERVOUS/ANXIOUS: 0
JOINT SWELLING: 0
SHORTNESS OF BREATH: 0
DIARRHEA: 0
MYALGIAS: 0
FEVER: 0
CHILLS: 0
PARESTHESIAS: 0

## 2019-04-19 ENCOUNTER — OFFICE VISIT (OUTPATIENT)
Dept: FAMILY MEDICINE | Facility: CLINIC | Age: 54
End: 2019-04-19
Payer: COMMERCIAL

## 2019-04-19 VITALS
SYSTOLIC BLOOD PRESSURE: 124 MMHG | BODY MASS INDEX: 27.74 KG/M2 | DIASTOLIC BLOOD PRESSURE: 79 MMHG | HEART RATE: 66 BPM | RESPIRATION RATE: 16 BRPM | TEMPERATURE: 98.4 F | WEIGHT: 183 LBS | HEIGHT: 68 IN | OXYGEN SATURATION: 96 %

## 2019-04-19 DIAGNOSIS — Z00.00 ROUTINE GENERAL MEDICAL EXAMINATION AT A HEALTH CARE FACILITY: Primary | ICD-10-CM

## 2019-04-19 DIAGNOSIS — I10 HYPERTENSION GOAL BP (BLOOD PRESSURE) < 140/90: ICD-10-CM

## 2019-04-19 DIAGNOSIS — Z12.11 SPECIAL SCREENING FOR MALIGNANT NEOPLASMS, COLON: ICD-10-CM

## 2019-04-19 DIAGNOSIS — Z86.0100 HISTORY OF COLON POLYPS: ICD-10-CM

## 2019-04-19 DIAGNOSIS — E78.5 HYPERLIPIDEMIA LDL GOAL <130: ICD-10-CM

## 2019-04-19 LAB
ALBUMIN SERPL-MCNC: 4.3 G/DL (ref 3.4–5)
ALP SERPL-CCNC: 62 U/L (ref 40–150)
ALT SERPL W P-5'-P-CCNC: 66 U/L (ref 0–70)
ANION GAP SERPL CALCULATED.3IONS-SCNC: 6 MMOL/L (ref 3–14)
AST SERPL W P-5'-P-CCNC: 31 U/L (ref 0–45)
BILIRUB SERPL-MCNC: 0.4 MG/DL (ref 0.2–1.3)
BUN SERPL-MCNC: 13 MG/DL (ref 7–30)
CALCIUM SERPL-MCNC: 9.3 MG/DL (ref 8.5–10.1)
CHLORIDE SERPL-SCNC: 105 MMOL/L (ref 94–109)
CO2 SERPL-SCNC: 28 MMOL/L (ref 20–32)
CREAT SERPL-MCNC: 0.74 MG/DL (ref 0.66–1.25)
CREAT UR-MCNC: 169 MG/DL
GFR SERPL CREATININE-BSD FRML MDRD: >90 ML/MIN/{1.73_M2}
GLUCOSE SERPL-MCNC: 109 MG/DL (ref 70–99)
MICROALBUMIN UR-MCNC: 9 MG/L
MICROALBUMIN/CREAT UR: 5.05 MG/G CR (ref 0–17)
POTASSIUM SERPL-SCNC: 4.2 MMOL/L (ref 3.4–5.3)
PROT SERPL-MCNC: 7.8 G/DL (ref 6.8–8.8)
SODIUM SERPL-SCNC: 139 MMOL/L (ref 133–144)

## 2019-04-19 PROCEDURE — 82043 UR ALBUMIN QUANTITATIVE: CPT | Performed by: FAMILY MEDICINE

## 2019-04-19 PROCEDURE — 80053 COMPREHEN METABOLIC PANEL: CPT | Performed by: FAMILY MEDICINE

## 2019-04-19 PROCEDURE — 36415 COLL VENOUS BLD VENIPUNCTURE: CPT | Performed by: FAMILY MEDICINE

## 2019-04-19 PROCEDURE — 99396 PREV VISIT EST AGE 40-64: CPT | Performed by: FAMILY MEDICINE

## 2019-04-19 RX ORDER — LISINOPRIL 40 MG/1
TABLET ORAL
Qty: 90 TABLET | Refills: 3 | Status: SHIPPED | OUTPATIENT
Start: 2019-04-19 | End: 2020-04-23

## 2019-04-19 RX ORDER — PRAVASTATIN SODIUM 20 MG
20 TABLET ORAL DAILY
Qty: 90 TABLET | Refills: 3 | Status: SHIPPED | OUTPATIENT
Start: 2019-04-19 | End: 2020-04-13

## 2019-04-19 ASSESSMENT — ENCOUNTER SYMPTOMS
HEARTBURN: 0
HEADACHES: 0
SHORTNESS OF BREATH: 0
FEVER: 0
EYE PAIN: 0
NERVOUS/ANXIOUS: 0
CONSTIPATION: 0
HEMATURIA: 0
DIZZINESS: 0
DIARRHEA: 0
NAUSEA: 0
PALPITATIONS: 0
DYSURIA: 0
ARTHRALGIAS: 0
PARESTHESIAS: 0
CHILLS: 0
MYALGIAS: 0
COUGH: 0
JOINT SWELLING: 0
HEMATOCHEZIA: 0
ABDOMINAL PAIN: 0
WEAKNESS: 0
SORE THROAT: 0
FREQUENCY: 0

## 2019-04-19 ASSESSMENT — MIFFLIN-ST. JEOR: SCORE: 1635.61

## 2019-04-19 NOTE — PROGRESS NOTES
SUBJECTIVE:   CC: Fabrice Donahue is an 53 year old male who presents for preventative health visit.     Healthy Habits:     Getting at least 3 servings of Calcium per day:  NO    Bi-annual eye exam:  NO    Dental care twice a year:  Yes    Sleep apnea or symptoms of sleep apnea:  None    Diet:  Breakfast skipped    Frequency of exercise:  2-3 days/week    Duration of exercise:  15-30 minutes    Taking medications regularly:  Yes    Medication side effects:  None    PHQ-2 Total Score: 1    Additional concerns today:  No              Today's PHQ-2 Score:   PHQ-2 (  [employer]) 2019   Q1: Little interest or pleasure in doing things 1   Q2: Feeling down, depressed or hopeless 0   PHQ-2 Score 1   Q1: Little interest or pleasure in doing things Several days   Q2: Feeling down, depressed or hopeless Not at all   PHQ-2 Score 1       Abuse: Current or Past(Physical, Sexual or Emotional)- No  Do you feel safe in your environment? Yes    Social History     Tobacco Use     Smoking status: Former Smoker     Packs/day: 0.50     Years: 5.00     Pack years: 2.50     Types: Cigarettes     Last attempt to quit: 10/1/2010     Years since quittin.5     Smokeless tobacco: Never Used   Substance Use Topics     Alcohol use: Yes     Comment: Less than 1 drink a week         Alcohol Use 2019   Prescreen: >3 drinks/day or >7 drinks/week? No   Prescreen: >3 drinks/day or >7 drinks/week? -       Last PSA:   PSA   Date Value Ref Range Status   2015 0.78 0 - 4 ug/L Final       Reviewed orders with patient. Reviewed health maintenance and updated orders accordingly - Yes  Labs reviewed in EPIC    Reviewed and updated as needed this visit by clinical staff  Tobacco  Allergies  Med Hx  Surg Hx  Fam Hx  Soc Hx        Reviewed and updated as needed this visit by Provider        Past Medical History:   Diagnosis Date     Benign neoplasm of skin of other and unspecified parts of face     atypical nevus     Essential hypertension,  "benign      Lyme disease 8/2007    hot welts, bull eye bruise     Mixed hyperlipidemia      Other chronic nonalcoholic liver disease 2006    Fatty liver     Shoulder pain, right     post clavicular fx     Syncope 7/08    neg Head/Neck CT     Unspecified hemorrhoids without mention of complication       Past Surgical History:   Procedure Laterality Date     C ECHO HEART XTHORACIC,COMPLETE, W/O DOPPLER  8/08    Nl     COLONOSCOPY  04/2014      US ABDOMEN COMPLETE  6/2006    fatty liver     HERNIA REPAIR  1984     SURGICAL HISTORY OF -   1982    L Inguinal Hernia     SURGICAL HISTORY OF -   8/09    R Clavicle Fracture Repair       Review of Systems   Constitutional: Negative for chills and fever.   HENT: Negative for congestion, ear pain, hearing loss and sore throat.    Eyes: Negative for pain and visual disturbance.   Respiratory: Negative for cough and shortness of breath.    Cardiovascular: Negative for chest pain, palpitations and peripheral edema.   Gastrointestinal: Negative for abdominal pain, constipation, diarrhea, heartburn, hematochezia and nausea.   Genitourinary: Negative for discharge, dysuria, frequency, genital sores, hematuria, impotence and urgency.   Musculoskeletal: Negative for arthralgias, joint swelling and myalgias.   Skin: Negative for rash.   Neurological: Negative for dizziness, weakness, headaches and paresthesias.   Psychiatric/Behavioral: Negative for mood changes. The patient is not nervous/anxious.          OBJECTIVE:   /79 (BP Location: Right arm, Patient Position: Sitting, Cuff Size: Adult Regular)   Pulse 66   Temp 98.4  F (36.9  C) (Oral)   Resp 16   Ht 1.721 m (5' 7.75\")   Wt 83 kg (183 lb)   SpO2 96%   BMI 28.03 kg/m      Physical Exam  GENERAL: healthy, alert and no distress  EYES: Eyes grossly normal to inspection, PERRL and conjunctivae and sclerae normal  HENT: ear canals and TM's normal, nose and mouth without ulcers or lesions  NECK: no adenopathy, no " "asymmetry, masses, or scars and thyroid normal to palpation  RESP: lungs clear to auscultation - no rales, rhonchi or wheezes  CV: regular rate and rhythm, normal S1 S2, no S3 or S4, no murmur, click or rub, no peripheral edema and peripheral pulses strong  ABDOMEN: soft, nontender, no hepatosplenomegaly, no masses and bowel sounds normal   (male): normal male genitalia without lesions or urethral discharge, no hernia  RECTAL: normal sphincter tone, no rectal masses, prostate normal size, smooth, nontender without nodules or masses  MS: no gross musculoskeletal defects noted, no edema  SKIN: no suspicious lesions or rashes  NEURO: Normal strength and tone, mentation intact and speech normal  PSYCH: mentation appears normal, affect normal/bright    Diagnostic Test Results:  none     ASSESSMENT/PLAN:   ASSESSMENT AND PLAN  1. Routine general medical examination at a health care facility  Up to date on immunizations.  Gave advanced directive to review , return.     2. Hypertension goal BP (blood pressure) < 140/90  Controlled.  Refills/labs.   - lisinopril (PRINIVIL/ZESTRIL) 40 MG tablet; TAKE 1/2 TABLET BY MOUTH DAILY  Dispense: 90 tablet; Refill: 3  - Comprehensive metabolic panel  - Albumin Random Urine Quantitative with Creat Ratio    3. Hyperlipidemia LDL goal <130  Had lipids already.  lfts today.     - pravastatin (PRAVACHOL) 20 MG tablet; Take 1 tablet (20 mg) by mouth daily  Dispense: 90 tablet; Refill: 3  - Comprehensive metabolic panel    4.  Due for colonoscopy.  Call below to schedule.     COUNSELING:   Reviewed preventive health counseling, as reflected in patient instructions       Regular exercise       Healthy diet/nutrition       Colon cancer screening       Prostate cancer screening    BP Readings from Last 1 Encounters:   04/19/19 124/79     Estimated body mass index is 28.03 kg/m  as calculated from the following:    Height as of this encounter: 1.721 m (5' 7.75\").    Weight as of this encounter: " 83 kg (183 lb).      Weight management plan: Discussed healthy diet and exercise guidelines     reports that he quit smoking about 8 years ago. His smoking use included cigarettes. He has a 2.50 pack-year smoking history. He has never used smokeless tobacco.      Counseling Resources:  ATP IV Guidelines  Pooled Cohorts Equation Calculator  FRAX Risk Assessment  ICSI Preventive Guidelines  Dietary Guidelines for Americans, 2010  USDA's MyPlate  ASA Prophylaxis  Lung CA Screening    Joel Daniel Wegener, MD  [facility]

## 2019-04-19 NOTE — PATIENT INSTRUCTIONS
"ASSESSMENT AND PLAN  1. Routine general medical examination at a health care facility  Up to date on immunizations.  Gave advanced directive to review , return.     2. Hypertension goal BP (blood pressure) < 140/90  Controlled.  Refills/labs.   - lisinopril (PRINIVIL/ZESTRIL) 40 MG tablet; TAKE 1/2 TABLET BY MOUTH DAILY  Dispense: 90 tablet; Refill: 3  - Comprehensive metabolic panel  - Albumin Random Urine Quantitative with Creat Ratio    3. Hyperlipidemia LDL goal <130  Had lipids already.  lfts today.     - pravastatin (PRAVACHOL) 20 MG tablet; Take 1 tablet (20 mg) by mouth daily  Dispense: 90 tablet; Refill: 3  - Comprehensive metabolic panel    4.  Due for colonoscopy.  Call below to schedule.     MYCHART FOR ON-LINE CARE(VISITS), LABS, REFILLS, MESSAGING, ETC [email or web address] , [phone]    E-VISIT: click \"on-line care, then request e-visit\".  E-visits work well for following up on issues we have discussed in clinic previously which may need new prescriptions, new prescriptions or substantial discussion. These are always done by me (Dr. Wegener).     ONCARE VISIT:  [email or web address]  - we treat nearly 50 common conditions through on-care.  These are done in an hour by on-call staff.     RADIOLOGY:  [facility]:  [phone]   [facility]: [phone]    Mammogram and Colonoscopy [phone]    Smoking Cessation: www.[email or web address].org, 6-398-133-PLAN (9930)      CONSUMER PRICE LINE for estimates of test costs:  [phone]       Preventive Health Recommendations  Male Ages 50 - 64    Yearly exam:             See your health care provider every year in order to  o   Review health changes.   o   Discuss preventive care.    o   Review your medicines if your doctor has prescribed any.     Have a cholesterol test every 5 years, or more frequently if you are at risk for high cholesterol/heart disease.     Have a diabetes test (fasting glucose) every three years. If you " are at risk for diabetes, you should have this test more often.     Have a colonoscopy at age 50, or have a yearly FIT test (stool test). These exams will check for colon cancer.      Talk with your health care provider about whether or not a prostate cancer screening test (PSA) is right for you.    You should be tested each year for STDs (sexually transmitted diseases), if you re at risk.     Shots: Get a flu shot each year. Get a tetanus shot every 10 years.     Nutrition:    Eat at least 5 servings of fruits and vegetables daily.     Eat whole-grain bread, whole-wheat pasta and brown rice instead of white grains and rice.     Get adequate Calcium and Vitamin D.     Lifestyle    Exercise for at least 150 minutes a week (30 minutes a day, 5 days a week). This will help you control your weight and prevent disease.     Limit alcohol to one drink per day.     No smoking.     Wear sunscreen to prevent skin cancer.     See your dentist every six months for an exam and cleaning.     See your eye doctor every 1 to 2 years.

## 2019-07-01 ENCOUNTER — TELEPHONE (OUTPATIENT)
Dept: SCHEDULING | Facility: CLINIC | Age: 54
End: 2019-07-01

## 2019-07-01 ENCOUNTER — HOSPITAL ENCOUNTER (OUTPATIENT)
Facility: AMBULATORY SURGERY CENTER | Age: 54
End: 2019-07-01
Attending: INTERNAL MEDICINE
Payer: COMMERCIAL

## 2019-08-19 ENCOUNTER — TELEPHONE (OUTPATIENT)
Dept: GASTROENTEROLOGY | Facility: CLINIC | Age: 54
End: 2019-08-19

## 2019-08-19 DIAGNOSIS — Z12.11 SPECIAL SCREENING FOR MALIGNANT NEOPLASMS, COLON: Primary | ICD-10-CM

## 2019-08-19 RX ORDER — BISACODYL 5 MG/1
TABLET, DELAYED RELEASE ORAL
Qty: 4 TABLET | Refills: 0 | Status: SHIPPED | OUTPATIENT
Start: 2019-08-24 | End: 2020-07-07

## 2019-08-19 NOTE — TELEPHONE ENCOUNTER
Patient Name: Fabrice Donahue   : 1965  MRN: [id number]       : [x] N/A   [] Yes:  Language  /  ID:      Additional Information regarding appointment:      Patient scheduled for:  [] EGD  [x] Colonoscopy  [] EUS  [] Flex Sig   [] Other:    Indication for procedure.   [x]    History of colon polyps [Z86.010]       Special screening for malignant neoplasms, colon [Z12.11]              Sedation Type: [x] Conscious Sedation   [] MAC   [] None    Procedure Provider:  Dr. Baig     Referring Provider. Joel Wegener [place]    Arrival time verified: 7:15 am / Monday /     Facility location verified:   [x][facility] - [address], [address]st Floor, [address]    Pt meets medical necessity for outpatient procedure in hospital Endoscopy Unit:       [x] N/A for this Payor (non-BCBS)      Prep Type:   []Gol[employer]ely eRx:Uzma Downey [place], [place]; [phone];  [] MoviPrep:  , [] MiraLax:  , [] Fleet x 2:    []NPO /p  , No solid food /p 2200 the night before    Anticoagulants or blood thinners: [x]None [] ASA 81mg  - may continue           [] Warfarin   [] Warfarin + Lovenox bridge [] Plavix [] Effient [] Eliquis         [] Xarelto  [] Brilinta [] NSAIDS  [] Other     LAST anticoagulant dose: Date/Time:    INR:      Electronic implanted devices: [x] No  [] IPG  []  ICD  []  LVAD  []      H&P / Pre op physical completed: [x] N/A, [] Complete, Date  , [] Scheduled, Date  , [] No,      Additional Information:      _______________________________________________      Instructions given: [x] Rec'd & Read   [x] Reviewed         Pre procedure teaching completed: [x] Yes - Reviewed, [] No,      [x] No questions regarding Sedation as ordered, [x]      Transportation from procedure & responsible adult to be with patient following procedure for a minimum of 6 hrs (Conscious Sedation) 24 hrs (MAC): [x] Yes   - confirmed will have post-procedure companionship as required, []  Pending  , [] No       Shanna Leiva RN  [facility]

## 2019-08-26 ENCOUNTER — HOSPITAL ENCOUNTER (OUTPATIENT)
Facility: AMBULATORY SURGERY CENTER | Age: 54
End: 2019-08-26
Attending: INTERNAL MEDICINE
Payer: COMMERCIAL

## 2019-08-26 VITALS
TEMPERATURE: 97.4 F | OXYGEN SATURATION: 94 % | RESPIRATION RATE: 16 BRPM | DIASTOLIC BLOOD PRESSURE: 70 MMHG | WEIGHT: 175 LBS | BODY MASS INDEX: 26.52 KG/M2 | SYSTOLIC BLOOD PRESSURE: 115 MMHG | HEART RATE: 59 BPM | HEIGHT: 68 IN

## 2019-08-26 LAB — COLONOSCOPY: NORMAL

## 2019-08-26 RX ORDER — ONDANSETRON 4 MG/1
4 TABLET, ORALLY DISINTEGRATING ORAL EVERY 6 HOURS PRN
Status: DISCONTINUED | OUTPATIENT
Start: 2019-08-26 | End: 2019-08-27 | Stop reason: HOSPADM

## 2019-08-26 RX ORDER — NALOXONE HYDROCHLORIDE 0.4 MG/ML
.1-.4 INJECTION, SOLUTION INTRAMUSCULAR; INTRAVENOUS; SUBCUTANEOUS
Status: DISCONTINUED | OUTPATIENT
Start: 2019-08-26 | End: 2019-08-27 | Stop reason: HOSPADM

## 2019-08-26 RX ORDER — SIMETHICONE
LIQUID (ML) MISCELLANEOUS PRN
Status: DISCONTINUED | OUTPATIENT
Start: 2019-08-26 | End: 2019-08-26 | Stop reason: HOSPADM

## 2019-08-26 RX ORDER — LIDOCAINE 40 MG/G
CREAM TOPICAL
Status: DISCONTINUED | OUTPATIENT
Start: 2019-08-26 | End: 2019-08-26 | Stop reason: HOSPADM

## 2019-08-26 RX ORDER — ONDANSETRON 2 MG/ML
4 INJECTION INTRAMUSCULAR; INTRAVENOUS
Status: DISCONTINUED | OUTPATIENT
Start: 2019-08-26 | End: 2019-08-26 | Stop reason: HOSPADM

## 2019-08-26 RX ORDER — FLUMAZENIL 0.1 MG/ML
0.2 INJECTION, SOLUTION INTRAVENOUS
Status: ACTIVE | OUTPATIENT
Start: 2019-08-26 | End: 2019-08-26

## 2019-08-26 RX ORDER — FENTANYL CITRATE 50 UG/ML
INJECTION, SOLUTION INTRAMUSCULAR; INTRAVENOUS PRN
Status: DISCONTINUED | OUTPATIENT
Start: 2019-08-26 | End: 2019-08-26 | Stop reason: HOSPADM

## 2019-08-26 RX ORDER — ONDANSETRON 2 MG/ML
4 INJECTION INTRAMUSCULAR; INTRAVENOUS EVERY 6 HOURS PRN
Status: DISCONTINUED | OUTPATIENT
Start: 2019-08-26 | End: 2019-08-27 | Stop reason: HOSPADM

## 2019-08-26 ASSESSMENT — MIFFLIN-ST. JEOR: SCORE: 1603.29

## 2019-08-26 NOTE — DISCHARGE INSTRUCTIONS
Discharge Instructions after Colonoscopy.    Today you had a  Colonoscopy.    Activity and Diet  You were given medicine for pain. You may be dizzy or sleepy.  For 24 hours:    Do not drive or use heavy equipment.    Do not make important decisions.    Do not drink any alcohol.  You may return to your normal diet and medicines.    Discomfort    Air was placed in your colon during the exam in order to see it. Walking helps to pass the air.    You may take Tylenol (acetaminophen) for pain unless your doctor has told you not to.  Do not take aspirin or ibuprofen (Advil, Motrin, or other anti-inflammatory  drugs) for 3-5 days.    Follow-up   We took small tissue samples or polyps to study. Your doctor will call you with the results  within two weeks.    When to call:    Call right away if you have:    Unusual pain in belly or chest pain not relieved with passing air.    More than 1 to 2 Tablespoons of bleeding from your rectum.    Fever above 100.6  F (37.5  C).    If you have severe pain, bleeding, or shortness of breath, go to an emergency room.    If you have questions, call:  Monday to Friday, 7 a.m. to 4:30 p.m.  Endoscopy: [phone] (We may have to call you back)    After hours  Hospital: [phone] (Ask for the GI fellow on call)

## 2019-08-27 LAB — COPATH REPORT: NORMAL

## 2019-09-30 ENCOUNTER — HEALTH MAINTENANCE LETTER (OUTPATIENT)
Age: 54
End: 2019-09-30

## 2020-04-12 DIAGNOSIS — E78.5 HYPERLIPIDEMIA LDL GOAL <130: ICD-10-CM

## 2020-04-13 RX ORDER — PRAVASTATIN SODIUM 20 MG
TABLET ORAL
Qty: 90 TABLET | Refills: 0 | Status: SHIPPED | OUTPATIENT
Start: 2020-04-13 | End: 2020-07-07

## 2020-04-13 NOTE — TELEPHONE ENCOUNTER
"Requested Prescriptions   Pending Prescriptions Disp Refills     pravastatin (PRAVACHOL) 20 MG tablet [Pharmacy Med Name: PRAVASTATIN 20MG TABLETS] 90 tablet 3     Sig: TAKE 1 TABLET(20 MG) BY MOUTH DAILY  Last Written Prescription Date:  4/19/2019  Last Fill Quantity: 90 tablet,  # refills: 3   Last Office Visit: 4/19/2019   Future Office Visit:            Statins Protocol Failed - 4/12/2020 11:39 AM        Failed - LDL on file in past 12 months     Recent Labs   Lab Test 03/11/19  0753   LDL Cannot estimate LDL when triglyceride exceeds 400 mg/dL  89           Passed - No abnormal creatine kinase in past 12 months     No lab results found.           Passed - Recent (12 mo) or future (30 days) visit within the authorizing provider's specialty     Patient has had an office visit with the authorizing provider or a provider within the authorizing providers department within the previous 12 mos or has a future within next 30 days. See \"Patient Info\" tab in inbasket, or \"Choose Columns\" in Meds & Orders section of the refill encounter.            Passed - Medication is active on med list        Passed - Patient is age 18 or older             "

## 2020-04-13 NOTE — TELEPHONE ENCOUNTER
Patient was to repeat lipids at his physical in April as per My Chart encounter dated 3/23/19.  Labs were not retested at that time.  Do you want patient to have a visit of some sort or can he do a lab only?  Susan Kaplan RN

## 2020-04-21 DIAGNOSIS — I10 HYPERTENSION GOAL BP (BLOOD PRESSURE) < 140/90: ICD-10-CM

## 2020-04-22 NOTE — TELEPHONE ENCOUNTER
"Requested Prescriptions   Pending Prescriptions Disp Refills     lisinopril (ZESTRIL) 40 MG tablet [Pharmacy Med Name: LISINOPRIL 40MG TABLETS] 90 tablet 3     Sig: TAKE 1/2 TABLET BY MOUTH DAILY      Last Written Prescription Date:  4/19/2019  Last Fill Quantity: 90 tablet    ,  # refills: 3   Last Office Visit: 4/19/2019   Future Office Visit:            ACE Inhibitors (Including Combos) Protocol Failed - 4/21/2020  3:55 AM        Failed - Recent (12 mo) or future (30 days) visit within the authorizing provider's specialty     Patient has had an office visit with the authorizing provider or a provider within the authorizing providers department within the previous 12 mos or has a future within next 30 days. See \"Patient Info\" tab in inbasket, or \"Choose Columns\" in Meds & Orders section of the refill encounter.          Failed - Normal serum creatinine on file in past 12 months     Recent Labs   Lab Test 04/19/19  0819   CR 0.74     Ok to refill medication if creatinine is low        Failed - Normal serum potassium on file in past 12 months     Recent Labs   Lab Test 04/19/19  0819   POTASSIUM 4.2           Passed - Blood pressure under 140/90 in past 12 months     BP Readings from Last 3 Encounters:   08/26/19 115/70   04/19/19 124/79   04/12/19 124/82           Passed - Medication is active on med list        Passed - Patient is age 18 or older             "

## 2020-04-23 RX ORDER — LISINOPRIL 40 MG/1
TABLET ORAL
Qty: 45 TABLET | Refills: 1 | Status: SHIPPED | OUTPATIENT
Start: 2020-04-23 | End: 2020-07-07

## 2020-04-23 NOTE — TELEPHONE ENCOUNTER
"Routing refill request to provider for review/approval because:  --Due for annual office visit.    --I see note in 4/12/2020 refill encounter for statin from Dr.Wegener- \"Recommend post covid physical/labs aug/sept. Joel Wegener,MD\"    --Routing lisinopril refill request to provider to authorize if ok.            "

## 2020-07-06 ASSESSMENT — ENCOUNTER SYMPTOMS
EYE PAIN: 0
HEMATOCHEZIA: 0
JOINT SWELLING: 0
WEAKNESS: 0
MYALGIAS: 0
COUGH: 0
ABDOMINAL PAIN: 0
NAUSEA: 0
HEMATURIA: 0
DIARRHEA: 0
FREQUENCY: 0
DIZZINESS: 0
PALPITATIONS: 0
ARTHRALGIAS: 0
HEARTBURN: 0
NERVOUS/ANXIOUS: 0
FEVER: 0
CONSTIPATION: 0
PARESTHESIAS: 0
SHORTNESS OF BREATH: 0
CHILLS: 0
HEADACHES: 0
SORE THROAT: 0
DYSURIA: 0

## 2020-07-07 ENCOUNTER — OFFICE VISIT (OUTPATIENT)
Dept: FAMILY MEDICINE | Facility: CLINIC | Age: 55
End: 2020-07-07
Payer: COMMERCIAL

## 2020-07-07 VITALS
SYSTOLIC BLOOD PRESSURE: 128 MMHG | TEMPERATURE: 97.9 F | WEIGHT: 176 LBS | RESPIRATION RATE: 16 BRPM | BODY MASS INDEX: 26.07 KG/M2 | DIASTOLIC BLOOD PRESSURE: 83 MMHG | HEIGHT: 69 IN | HEART RATE: 68 BPM | OXYGEN SATURATION: 93 %

## 2020-07-07 DIAGNOSIS — I10 HYPERTENSION GOAL BP (BLOOD PRESSURE) < 140/90: ICD-10-CM

## 2020-07-07 DIAGNOSIS — N52.9 ERECTILE DYSFUNCTION, UNSPECIFIED ERECTILE DYSFUNCTION TYPE: ICD-10-CM

## 2020-07-07 DIAGNOSIS — N52.9 VASCULOGENIC ERECTILE DYSFUNCTION, UNSPECIFIED VASCULOGENIC ERECTILE DYSFUNCTION TYPE: ICD-10-CM

## 2020-07-07 DIAGNOSIS — E78.5 HYPERLIPIDEMIA LDL GOAL <130: ICD-10-CM

## 2020-07-07 DIAGNOSIS — Z00.00 ROUTINE GENERAL MEDICAL EXAMINATION AT A HEALTH CARE FACILITY: Primary | ICD-10-CM

## 2020-07-07 DIAGNOSIS — Z87.891 PERSONAL HISTORY OF TOBACCO USE: ICD-10-CM

## 2020-07-07 LAB
ALBUMIN SERPL-MCNC: 4 G/DL (ref 3.4–5)
ALP SERPL-CCNC: 59 U/L (ref 40–150)
ALT SERPL W P-5'-P-CCNC: 68 U/L (ref 0–70)
ANION GAP SERPL CALCULATED.3IONS-SCNC: 7 MMOL/L (ref 3–14)
AST SERPL W P-5'-P-CCNC: 38 U/L (ref 0–45)
BILIRUB SERPL-MCNC: 0.6 MG/DL (ref 0.2–1.3)
BUN SERPL-MCNC: 13 MG/DL (ref 7–30)
CALCIUM SERPL-MCNC: 8.9 MG/DL (ref 8.5–10.1)
CHLORIDE SERPL-SCNC: 104 MMOL/L (ref 94–109)
CHOLEST SERPL-MCNC: 187 MG/DL
CO2 SERPL-SCNC: 26 MMOL/L (ref 20–32)
CREAT SERPL-MCNC: 0.8 MG/DL (ref 0.66–1.25)
CREAT UR-MCNC: 108 MG/DL
GFR SERPL CREATININE-BSD FRML MDRD: >90 ML/MIN/{1.73_M2}
GLUCOSE SERPL-MCNC: 101 MG/DL (ref 70–99)
HDLC SERPL-MCNC: 24 MG/DL
LDLC SERPL CALC-MCNC: 104 MG/DL
MICROALBUMIN UR-MCNC: 5 MG/L
MICROALBUMIN/CREAT UR: 4.72 MG/G CR (ref 0–17)
NONHDLC SERPL-MCNC: 163 MG/DL
POTASSIUM SERPL-SCNC: 4.2 MMOL/L (ref 3.4–5.3)
PROT SERPL-MCNC: 7.6 G/DL (ref 6.8–8.8)
SODIUM SERPL-SCNC: 137 MMOL/L (ref 133–144)
TRIGL SERPL-MCNC: 293 MG/DL

## 2020-07-07 PROCEDURE — 80061 LIPID PANEL: CPT | Performed by: FAMILY MEDICINE

## 2020-07-07 PROCEDURE — 99213 OFFICE O/P EST LOW 20 MIN: CPT | Mod: 25 | Performed by: FAMILY MEDICINE

## 2020-07-07 PROCEDURE — 80053 COMPREHEN METABOLIC PANEL: CPT | Performed by: FAMILY MEDICINE

## 2020-07-07 PROCEDURE — 36415 COLL VENOUS BLD VENIPUNCTURE: CPT | Performed by: FAMILY MEDICINE

## 2020-07-07 PROCEDURE — 99396 PREV VISIT EST AGE 40-64: CPT | Performed by: FAMILY MEDICINE

## 2020-07-07 PROCEDURE — 82043 UR ALBUMIN QUANTITATIVE: CPT | Performed by: FAMILY MEDICINE

## 2020-07-07 PROCEDURE — 84270 ASSAY OF SEX HORMONE GLOBUL: CPT | Performed by: FAMILY MEDICINE

## 2020-07-07 PROCEDURE — G0296 VISIT TO DETERM LDCT ELIG: HCPCS | Performed by: FAMILY MEDICINE

## 2020-07-07 PROCEDURE — 84403 ASSAY OF TOTAL TESTOSTERONE: CPT | Performed by: FAMILY MEDICINE

## 2020-07-07 RX ORDER — PRAVASTATIN SODIUM 20 MG
20 TABLET ORAL DAILY
Qty: 90 TABLET | Refills: 3 | Status: SHIPPED | OUTPATIENT
Start: 2020-07-07 | End: 2021-06-28

## 2020-07-07 RX ORDER — LISINOPRIL 10 MG/1
10 TABLET ORAL DAILY
Qty: 90 TABLET | Refills: 3 | Status: SHIPPED | OUTPATIENT
Start: 2020-07-07 | End: 2021-07-01

## 2020-07-07 RX ORDER — SILDENAFIL CITRATE 20 MG/1
TABLET ORAL
Qty: 20 TABLET | Refills: 11 | Status: SHIPPED | OUTPATIENT
Start: 2020-07-07 | End: 2020-09-15

## 2020-07-07 ASSESSMENT — ENCOUNTER SYMPTOMS
MYALGIAS: 0
DIARRHEA: 0
ARTHRALGIAS: 0
COUGH: 0
HEMATOCHEZIA: 0
SHORTNESS OF BREATH: 0
DIZZINESS: 0
NERVOUS/ANXIOUS: 0
HEARTBURN: 0
ABDOMINAL PAIN: 0
HEADACHES: 0
HEMATURIA: 0
FREQUENCY: 0
PARESTHESIAS: 0
SORE THROAT: 0
DYSURIA: 0
WEAKNESS: 0
CONSTIPATION: 0
EYE PAIN: 0
JOINT SWELLING: 0
PALPITATIONS: 0
FEVER: 0
CHILLS: 0
NAUSEA: 0

## 2020-07-07 ASSESSMENT — MIFFLIN-ST. JEOR: SCORE: 1618.71

## 2020-07-07 NOTE — PROGRESS NOTES
"  3  SUBJECTIVE:   CC: Fabrice Donahue is an 54 year old male who presents for preventive health visit.     Healthy Habits:    Do you get at least three servings of calcium containing foods daily (dairy, green leafy vegetables, etc.)? { :180627::\"yes\"}    Amount of exercise or daily activities, outside of work: { :523112}    Problems taking medications regularly { :319323::\"No\"}    Medication side effects: { :925837::\"No\"}    Have you had an eye exam in the past two years? { :508302}    Do you see a dentist twice per year? { :389497}    Do you have sleep apnea, excessive snoring or daytime drowsiness?{ :825817}  {Outside tests to abstract? :566726}    {additional problems to add (Optional):575556}    Today's PHQ-2 Score:   PHQ-2 (  [employer]) 2020   Q1: Little interest or pleasure in doing things 0 1   Q2: Feeling down, depressed or hopeless 0 0   PHQ-2 Score 0 1   Q1: Little interest or pleasure in doing things Not at all Several days   Q2: Feeling down, depressed or hopeless Not at all Not at all   PHQ-2 Score 0 1     {PHQ-2 LOOK IN ASSESSMENTS (Optional) :468285}  Abuse: Current or Past(Physical, Sexual or Emotional)- {YES/NO/NA:025274}  Do you feel safe in your environment? {YES/NO/NA:729505}    Have you ever done Advance Care Planning? (For example, a Health Directive, POLST, or a discussion with a medical provider or your loved ones about your wishes): { :431854}    Social History     Tobacco Use     Smoking status: Former Smoker     Packs/day: 0.50     Years: 5.00     Pack years: 2.50     Types: Cigarettes     Last attempt to quit: 10/1/2010     Years since quittin.7     Smokeless tobacco: Never Used   Substance Use Topics     Alcohol use: Yes     Comment: Less than 1 drink a week     If you drink alcohol do you typically have >3 drinks per day or >7 drinks per week? {ETOH :115553}                      Last PSA:   PSA   Date Value Ref Range Status   2015 0.78 0 - 4 ug/L Final " "      Reviewed orders with patient. Reviewed health maintenance and updated orders accordingly - {Yes/No:307970::\"Yes\"}  {Chronicprobdata (Optional):494053}    Reviewed and updated as needed this visit by clinical staff         Reviewed and updated as needed this visit by Provider        {HISTORY OPTIONS (Optional):511203}    ROS:  { :696471::\"CONSTITUTIONAL: NEGATIVE for fever, chills, change in weight\",\"INTEGUMENTARY/SKIN: NEGATIVE for worrisome rashes, moles or lesions\",\"EYES: NEGATIVE for vision changes or irritation\",\"ENT: NEGATIVE for ear, mouth and throat problems\",\"RESP: NEGATIVE for significant cough or SOB\",\"CV: NEGATIVE for chest pain, palpitations or peripheral edema\",\"GI: NEGATIVE for nausea, abdominal pain, heartburn, or change in bowel habits\",\" male: negative for dysuria, hematuria, decreased urinary stream, erectile dysfunction, urethral discharge\",\"MUSCULOSKELETAL: NEGATIVE for significant arthralgias or myalgia\",\"NEURO: NEGATIVE for weakness, dizziness or paresthesias\",\"PSYCHIATRIC: NEGATIVE for changes in mood or affect\"}    OBJECTIVE:   There were no vitals taken for this visit.  EXAM:  {Exam Choices:175656}    {Diagnostic Test Results (Optional):373543::\"Diagnostic Test Results:\",\"Labs reviewed in Epic\"}    ASSESSMENT/PLAN:   {Diag Picklist:816992}    COUNSELING:  {MALE COUNSELING MESSAGES:650570::\"Reviewed preventive health counseling, as reflected in patient instructions\"}    Estimated body mass index is 26.61 kg/m  as calculated from the following:    Height as of 8/26/19: 1.727 m (5' 8\").    Weight as of 8/26/19: 79.4 kg (175 lb).    {Weight Management Plan (ACO) Complete if BMI is abnormal-  Ages 18-64  BMI >24.9.  Age 65+ with BMI <23 or >30 (Optional):078423}     reports that he quit smoking about 9 years ago. His smoking use included cigarettes. He has a 2.50 pack-year smoking history. He has never used smokeless tobacco.  {Tobacco Cessation -- Complete if patient is a smoker " (Optional):808774}    Counseling Resources:  ATP IV Guidelines  Pooled Cohorts Equation Calculator  FRAX Risk Assessment  ICSI Preventive Guidelines  Dietary Guidelines for Americans, 2010  USDA's MyPlate  ASA Prophylaxis  Lung CA Screening    Joel Daniel Wegener, MD  [facility]

## 2020-07-07 NOTE — PROGRESS NOTES
SUBJECTIVE:   CC: Fabrice Donahue is an 54 year old male who presents for preventative health visit.     Healthy Habits:     Getting at least 3 servings of Calcium per day:  Yes    Bi-annual eye exam:  Yes    Dental care twice a year:  Yes    Sleep apnea or symptoms of sleep apnea:  Daytime drowsiness    Diet:  Regular (no restrictions) and Low salt    Frequency of exercise:  4-5 days/week    Duration of exercise:  30-45 minutes    Taking medications regularly:  Yes    Medication side effects:  Muscle aches    PHQ-2 Total Score: 0    Additional concerns today:  No          PROBLEMS TO ADD ON...  -------------------------------------  Overall doing well    Today's PHQ-2 Score:   PHQ-2 (  [employer]) 2020   Q1: Little interest or pleasure in doing things 0   Q2: Feeling down, depressed or hopeless 0   PHQ-2 Score 0   Q1: Little interest or pleasure in doing things Not at all   Q2: Feeling down, depressed or hopeless Not at all   PHQ-2 Score 0       Abuse: Current or Past(Physical, Sexual or Emotional)- No  Do you feel safe in your environment? Yes    Have you ever done Advance Care Planning? (For example, a Health Directive, POLST, or a discussion with a medical provider or your loved ones about your wishes): Yes, advance care planning is on file.    Social History     Tobacco Use     Smoking status: Former Smoker     Packs/day: 0.50     Years: 5.00     Pack years: 2.50     Types: Cigarettes     Last attempt to quit: 10/1/2010     Years since quittin.7     Smokeless tobacco: Never Used   Substance Use Topics     Alcohol use: Yes     Comment: Less than 1 drink a week     If you drink alcohol do you typically have >3 drinks per day or >7 drinks per week? No    No flowsheet data found.    Last PSA:   PSA   Date Value Ref Range Status   2015 0.78 0 - 4 ug/L Final       Reviewed orders with patient. Reviewed health maintenance and updated orders accordingly - Yes  Lab work is in process    Reviewed and updated  as needed this visit by clinical staff  Tobacco  Allergies  Med Hx  Surg Hx  Fam Hx  Soc Hx        Reviewed and updated as needed this visit by Provider        Past Medical History:   Diagnosis Date     Benign neoplasm of skin of other and unspecified parts of face     atypical nevus     Essential hypertension, benign      Lyme disease 8/2007    hot welts, bull eye bruise     Mixed hyperlipidemia      Other chronic nonalcoholic liver disease 2006    Fatty liver     Shoulder pain, right     post clavicular fx     Syncope 7/08    neg Head/Neck CT     Unspecified hemorrhoids without mention of complication       Past Surgical History:   Procedure Laterality Date     C ECHO HEART XTHORACIC,COMPLETE, W/O DOPPLER  8/08    Nl     COLONOSCOPY  04/2014     COLONOSCOPY N/A 8/26/2019    Procedure: COLONOSCOPY, WITH POLYPECTOMY;  Surgeon: José Baig MD;  Location:  OR      US ABDOMEN COMPLETE  6/2006    fatty liver     HERNIA REPAIR  1984     SURGICAL HISTORY OF -   1982    L Inguinal Hernia     SURGICAL HISTORY OF -   8/09    R Clavicle Fracture Repair       Review of Systems   Constitutional: Negative for chills and fever.   HENT: Negative for congestion, ear pain, hearing loss and sore throat.    Eyes: Negative for pain and visual disturbance.   Respiratory: Negative for cough and shortness of breath.    Cardiovascular: Negative for chest pain, palpitations and peripheral edema.   Gastrointestinal: Negative for abdominal pain, constipation, diarrhea, heartburn, hematochezia and nausea.   Genitourinary: Positive for impotence. Negative for discharge, dysuria, frequency, genital sores, hematuria and urgency.   Musculoskeletal: Negative for arthralgias, joint swelling and myalgias.   Skin: Negative for rash.   Neurological: Negative for dizziness, weakness, headaches and paresthesias.   Psychiatric/Behavioral: Negative for mood changes. The patient is not nervous/anxious.      CONSTITUTIONAL: NEGATIVE for fever,  "chills, change in weight  INTEGUMENTARY/SKIN: NEGATIVE for worrisome rashes, moles or lesions  EYES: NEGATIVE for vision changes or irritation  ENT: NEGATIVE for ear, mouth and throat problems  RESP: NEGATIVE for significant cough or SOB  CV: NEGATIVE for chest pain, palpitations or peripheral edema  GI: NEGATIVE for nausea, abdominal pain, heartburn, or change in bowel habits   male: negative for dysuria, hematuria, decreased urinary stream, erectile dysfunction, urethral discharge  MUSCULOSKELETAL: NEGATIVE for significant arthralgias or myalgia  NEURO: NEGATIVE for weakness, dizziness or paresthesias  PSYCHIATRIC: NEGATIVE for changes in mood or affect    OBJECTIVE:   /83 (BP Location: Right arm, Patient Position: Sitting, Cuff Size: Adult Regular)   Pulse 68   Temp 97.9  F (36.6  C) (Oral)   Resp 16   Ht 1.753 m (5' 9\")   Wt 79.8 kg (176 lb)   SpO2 93%   BMI 25.99 kg/m      Physical Exam  GENERAL: healthy, alert and no distress  EYES: Eyes grossly normal to inspection, PERRL and conjunctivae and sclerae normal  HENT: ear canals and TM's normal, nose and mouth without ulcers or lesions  NECK: no adenopathy, no asymmetry, masses, or scars and thyroid normal to palpation  RESP: lungs clear to auscultation - no rales, rhonchi or wheezes  CV: regular rate and rhythm, normal S1 S2, no S3 or S4, no murmur, click or rub, no peripheral edema and peripheral pulses strong  ABDOMEN: soft, nontender, no hepatosplenomegaly, no masses and bowel sounds normal   (male): normal male genitalia without lesions or urethral discharge, no hernia  RECTAL: normal sphincter tone, no rectal masses, prostate normal size, smooth, nontender without nodules or masses  MS: no gross musculoskeletal defects noted, no edema  SKIN: no suspicious lesions or rashes  NEURO: Normal strength and tone, mentation intact and speech normal  PSYCH: mentation appears normal, affect normal/bright    Diagnostic Test Results:  Labs reviewed in " "Epic    ASSESSMENT/PLAN:   ASSESSMENT AND PLAN  1. Routine general medical examination at a health care facility  Check monitoring labs, testosterone for ED.   Trial off-label generic sildenafil.     Decrease lisinopril to 10mg/daily since doing so great on exercise    Not enough snmnoking history to warrant chest ct, removed from to do list,   Give advanced directive to review/return     Will monitor home blood pressue and if consistently above 140/90 will let me know and we can adjust dose after.     Check lipids, potentially decrease dose pending results.     Follow up one year.     2. Hyperlipidemia LDL goal <130    - pravastatin (PRAVACHOL) 20 MG tablet; Take 1 tablet (20 mg) by mouth daily  Dispense: 90 tablet; Refill: 3  - Lipid panel reflex to direct LDL Fasting    3. Hypertension goal BP (blood pressure) < 140/90    - lisinopril (ZESTRIL) 10 MG tablet; Take 1 tablet (10 mg) by mouth daily  Dispense: 90 tablet; Refill: 3  - Albumin Random Urine Quantitative with Creat Ratio  - Comprehensive metabolic panel    4. Vasculogenic erectile dysfunction, unspecified vasculogenic erectile dysfunction type      5. Erectile dysfunction, unspecified erectile dysfunction type    - Testosterone Free and Total  - sildenafil (REVATIO) 20 MG tablet; 1-2 tablets prior to sexual activity daily as needed.  Dispense: 20 tablet; Refill: 11        MYCHART SIGN UP: http://myhealth.[facility].org , 1-512.662.1900    E-VISITS CAN BE DONE FOR CARE/PRESCRIPTIONS WHICH MAY NOT NEED AN IN-PERSON ASSESSMENT - click \"on-line care, then request e-visit\".      ONCARE VISIT/PRESCRIPTIONS: Https://[email or web address].org  - we treat nearly 50 common conditions with one hour response time     RADIOLOGY SCHEDULING  [facility]:  [phone]   [facility]: [phone]  [facility]: [phone]    Mammogram and Colonoscopy [phone]    Smoking Cessation: www.quitplan.org, 0-716-384-PLAN (3280)    Pharmacy savings card application: " "www.familywize.Future Simple    CONSUMER PRICE LINE for estimates of test costs:  [phone]         COUNSELING:   Reviewed preventive health counseling, as reflected in patient instructions       Regular exercise       Healthy diet/nutrition       Colon cancer screening       Prostate cancer screening    Estimated body mass index is 25.99 kg/m  as calculated from the following:    Height as of this encounter: 1.753 m (5' 9\").    Weight as of this encounter: 79.8 kg (176 lb).     Weight management plan: Discussed healthy diet and exercise guidelines     reports that he quit smoking about 9 years ago. His smoking use included cigarettes. He has a 2.50 pack-year smoking history. He has never used smokeless tobacco.      Counseling Resources:  ATP IV Guidelines  Pooled Cohorts Equation Calculator  FRAX Risk Assessment  ICSI Preventive Guidelines  Dietary Guidelines for Americans, 2010  USDA's MyPlate  ASA Prophylaxis  Lung CA Screening    Joel Daniel Wegener, MD  [facility]  Lung Cancer Screening Shared Decision Making Visit     Fabrice Donahue is not eligible for lung cancer screening on the basis of the information provided in my signed lung cancer screening order. Fabrice's smoking history is below the threshold and so it is not recommended.    Patient is not currently a smoker and so we did discuss that the only way to prevent lung cancer is to not smoke. Smoking cessation assistance was not offered.    ShouldIScreen    "

## 2020-07-07 NOTE — PATIENT INSTRUCTIONS
Check monitoring labs, testosterone for ED.   Trial off-label generic sildenafil.     Decrease lisinopril to 10mg/daily since doing so great on exercise    Not enough snmnoking history to warrant chest ct, removed from to do list,   Give advanced directive to review/return     Will monitor home blood pressue and if consistently above 140/90 will let me know and we can adjust dose after.     Check lipids, potentially decrease dose pending results.     Follow up one year.     Preventive Health Recommendations  Male Ages 50 - 64    Yearly exam:             See your health care provider every year in order to  o   Review health changes.   o   Discuss preventive care.    o   Review your medicines if your doctor has prescribed any.     Have a cholesterol test every 5 years, or more frequently if you are at risk for high cholesterol/heart disease.     Have a diabetes test (fasting glucose) every three years. If you are at risk for diabetes, you should have this test more often.     Have a colonoscopy at age 50, or have a yearly FIT test (stool test). These exams will check for colon cancer.      Talk with your health care provider about whether or not a prostate cancer screening test (PSA) is right for you.    You should be tested each year for STDs (sexually transmitted diseases), if you re at risk.     Shots: Get a flu shot each year. Get a tetanus shot every 10 years.     Nutrition:    Eat at least 5 servings of fruits and vegetables daily.     Eat whole-grain bread, whole-wheat pasta and brown rice instead of white grains and rice.     Get adequate Calcium and Vitamin D.     Lifestyle    Exercise for at least 150 minutes a week (30 minutes a day, 5 days a week). This will help you control your weight and prevent disease.     Limit alcohol to one drink per day.     No smoking.     Wear sunscreen to prevent skin cancer.     See your dentist every six months for an exam and cleaning.     See your eye doctor every 1 to 2  years.

## 2020-07-09 LAB
SHBG SERPL-SCNC: 29 NMOL/L (ref 11–80)
TESTOST FREE SERPL-MCNC: 5.07 NG/DL (ref 4.7–24.4)
TESTOST SERPL-MCNC: 244 NG/DL (ref 240–950)

## 2020-07-21 ENCOUNTER — MYC MEDICAL ADVICE (OUTPATIENT)
Dept: FAMILY MEDICINE | Facility: CLINIC | Age: 55
End: 2020-07-21

## 2020-07-21 DIAGNOSIS — R68.82 LOW LIBIDO: ICD-10-CM

## 2020-07-21 DIAGNOSIS — N52.9 ERECTILE DYSFUNCTION, UNSPECIFIED ERECTILE DYSFUNCTION TYPE: Primary | ICD-10-CM

## 2020-07-21 DIAGNOSIS — R79.89 LOW SERUM TESTOSTERONE LEVEL: ICD-10-CM

## 2020-07-22 NOTE — TELEPHONE ENCOUNTER
Want to know if his lack of libido could be from a low ( he is on the lower side of nor,al results)  testosterone?   He has tried the sildenafil 3 times without the desired results.

## 2020-07-22 NOTE — TELEPHONE ENCOUNTER
Ref Range & Units  2wk ago Resulting Agency        Testosterone Total  240 - 950 ng/dL  244   [facility]

## 2020-07-22 NOTE — TELEPHONE ENCOUNTER
I recommend recheck testosterone one more time (fasting at about 10-11 am) and follow up with endocrinology to discuss possible treatment.     Joel Wegener,MD

## 2020-07-23 ENCOUNTER — TELEPHONE (OUTPATIENT)
Dept: ENDOCRINOLOGY | Facility: CLINIC | Age: 55
End: 2020-07-23

## 2020-07-23 DIAGNOSIS — N52.9 ERECTILE DYSFUNCTION, UNSPECIFIED ERECTILE DYSFUNCTION TYPE: ICD-10-CM

## 2020-07-23 DIAGNOSIS — R79.89 LOW SERUM TESTOSTERONE LEVEL: ICD-10-CM

## 2020-07-23 DIAGNOSIS — R68.82 LOW LIBIDO: ICD-10-CM

## 2020-07-23 PROCEDURE — 36415 COLL VENOUS BLD VENIPUNCTURE: CPT | Performed by: FAMILY MEDICINE

## 2020-07-23 PROCEDURE — 84270 ASSAY OF SEX HORMONE GLOBUL: CPT | Performed by: FAMILY MEDICINE

## 2020-07-23 PROCEDURE — 84403 ASSAY OF TOTAL TESTOSTERONE: CPT | Performed by: FAMILY MEDICINE

## 2020-07-23 NOTE — TELEPHONE ENCOUNTER
[facility] Call Center    Phone Message    May a detailed message be left on voicemail: yes     Reason for Call: Appointment Intake    Referring Provider Name: Wegener, Joel Daniel Irwin, MD in  FAMILY [facility]EDS    Diagnosis and/or Symptoms:   Erectile dysfunction, unspecified erectile dysfunction type [N52.9]  Low libido [R68.82]  Low serum testosterone level [R79.89]    Pt called to schedule. Per guideline for Erectile dysfunction transfer to Urology. I called Urology they said to schedule with Endocrine. Per guideline for low testosterone level only schedule with endocrine if without Erectile dysfunction. Please call pt to discuss. Thank you     Action Taken: Message routed to:  [facility] endo    Travel Screening: Not Applicable

## 2020-07-25 LAB
SHBG SERPL-SCNC: 30 NMOL/L (ref 11–80)
TESTOST FREE SERPL-MCNC: 6.3 NG/DL (ref 4.7–24.4)
TESTOST SERPL-MCNC: 303 NG/DL (ref 240–950)

## 2020-07-25 NOTE — RESULT ENCOUNTER NOTE
Leonidas BlumKarolina Donahue,  Your results came back and are within acceptable limits. . If you have any further concerns please do not hesitate to contact us by message, phone or making an appointment.  Have a good day   Dr Avelino WAITE

## 2020-07-29 NOTE — TELEPHONE ENCOUNTER
RECORDS RECEIVED FROM: Internal   DATE RECEIVED: 8.5.20   NOTES (FOR ALL VISITS) STATUS DETAILS   OFFICE NOTES from referring provider Internal 7.22.20 Wegener, FV  7.7.20   OFFICE NOTES from other specialist N/A    ED NOTES N/A    OPERATIVE REPORT  (thyroid, pituitary, adrenal, parathyroid) N/A    MEDICATION LIST Internal    IMAGING      DEXASCAN N/A    MRI (BRAIN) N/A    XR (Chest) N/A    CT (HEAD/NECK/CHEST/ABDOMEN) N/A    NUCLEAR  N/A    ULTRASOUND (HEAD/NECK) N/A    LABS     DIABETES: HBGA1C, CREATININE, FASTING LIPIDS, MICROALBUMIN URINE, POTASSIUM, TSH, T4    THYROID: TSH, T4, CBC, THYRODLONULIN, TOTAL T3, FREE T4, CALCITONIN, CEA Internal

## 2020-08-05 ENCOUNTER — VIRTUAL VISIT (OUTPATIENT)
Dept: ENDOCRINOLOGY | Facility: CLINIC | Age: 55
End: 2020-08-05
Attending: FAMILY MEDICINE
Payer: COMMERCIAL

## 2020-08-05 ENCOUNTER — PRE VISIT (OUTPATIENT)
Dept: ENDOCRINOLOGY | Facility: CLINIC | Age: 55
End: 2020-08-05

## 2020-08-05 DIAGNOSIS — R68.82 LOW LIBIDO: ICD-10-CM

## 2020-08-05 DIAGNOSIS — E29.1 HYPOGONADISM MALE: ICD-10-CM

## 2020-08-05 DIAGNOSIS — N52.8 OTHER MALE ERECTILE DYSFUNCTION: Primary | ICD-10-CM

## 2020-08-05 NOTE — PATIENT INSTRUCTIONS
Nice to talk with you today in virtual clinic.    In the EARLY MORNING, Please go to any [facility] lab. Follow standard safety precautions for COVID-19, practice social isolation, hand hygiene, do not touch your face, nose, or mouth, wear mask if have to go out in public to be respectful of community.  To find a lab location near you, please call [phone].    Please see Urology as well.    RTC: TBD based on results    Best Wishes,  Dr. Ling Jimenez MD, MPH  Endocrinologist

## 2020-08-05 NOTE — NURSING NOTE
Chief Complaint   Patient presents with     New Patient     erectile dysfunction     Sharron Santiago CMA

## 2020-08-05 NOTE — PROGRESS NOTES
"Fabrice Donahue is a 54 year old male who is being evaluated via a billable video visit.      The patient has been notified of following:     \"This video visit will be conducted via a call between you and your physician/provider. We have found that certain health care needs can be provided without the need for an in-person physical exam.  This service lets us provide the care you need with a video conversation.  If a prescription is necessary we can send it directly to your pharmacy.  If lab work is needed we can place an order for that and you can then stop by our lab to have the test done at a later time.    Video visits are billed at different rates depending on your insurance coverage.  Please reach out to your insurance provider with any questions.    If during the course of the call the physician/provider feels a video visit is not appropriate, you will not be charged for this service.\"    Patient has given verbal consent for Video visit? Yes  How would you like to obtain your AVS? Mail a copy  If you are dropped from the video visit, the video invite should be resent to: Send to e-mail at: [email or web address]  Will anyone else be joining your video visit? No    Reason for visit/consult: ED    Primary care provider: Wegener, Joel Daniel Irwin    HPI:  55 yo male seen at consult request of Dr. Wegener for evaluation & management of ED, low libido, and hypogonadism with low-rosana, yet normal range testosterone of 303 on 7/23/20 collected at 9:58am and 244 a month prior and was 349 about 4 yrs prior. He has had noticed a slow decline in function. He has been taking sildenafil (Revatio) 20 mg recently and this has not been helping. He does not have spontaneous morning erections x past 3-4 months. He has 4 kids and did not have any issues with fertility. He has not been on testosterone replacement before. Libido is almost non-existent, started to notice a gradual decline over past 5-10 years. Last intercourse was several " months ago and it was not to his satisfaction. He has some anxiety.  No chest pain or shortness of breath or CVD.    Past Medical/Surgical History:  Past Medical History:   Diagnosis Date     Benign neoplasm of skin of other and unspecified parts of face     atypical nevus     Essential hypertension, benign      Lyme disease 8/2007    hot welts, bull eye bruise     Mixed hyperlipidemia      Other chronic nonalcoholic liver disease 2006    Fatty liver     Shoulder pain, right     post clavicular fx     Syncope 7/08    neg Head/Neck CT     Unspecified hemorrhoids without mention of complication      Past Surgical History:   Procedure Laterality Date     C ECHO HEART XTHORACIC,COMPLETE, W/O DOPPLER  8/08    Nl     COLONOSCOPY  04/2014     COLONOSCOPY N/A 8/26/2019    Procedure: COLONOSCOPY, WITH POLYPECTOMY;  Surgeon: José Baig MD;  [facility]     HC US ABDOMEN COMPLETE  6/2006    fatty liver     HERNIA REPAIR  1984     SURGICAL HISTORY OF -   1982    L Inguinal Hernia     SURGICAL HISTORY OF -   8/09    R Clavicle Fracture Repair       Allergies:  Allergies   Allergen Reactions     Ivp Dye [Contrast Dye]        Current Medications   Current Outpatient Medications   Medication     lisinopril (ZESTRIL) 10 MG tablet     Multiple Vitamin (MULTI-VITAMIN) per tablet     pravastatin (PRAVACHOL) 20 MG tablet     sildenafil (REVATIO) 20 MG tablet     No current facility-administered medications for this visit.        Family History:  Family History   Problem Relation Age of Onset     Hypertension Mother      Cancer Mother         kidney     Lipids Mother         high     Thyroid Disease Mother         hyper     Hyperlipidemia Mother      Respiratory Father         emphysema     Hypertension Maternal Grandmother      Cerebrovascular Disease Maternal Grandmother      Lipids Sister         high     Hypertension Sister      Lipids Brother         high     Hypertension Brother      Diabetes Maternal Aunt         type II      Hypertension Sister        Social History: . 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 Rare alcohol use. No drug use.  Social History     Tobacco Use     Smoking status: Former Smoker     Packs/day: 0.50     Years: 5.00     Pack years: 2.50     Types: Cigarettes     Last attempt to quit: 10/1/2010     Years since quittin.8     Smokeless tobacco: Never Used   Substance Use Topics     Alcohol use: Yes     Comment: Less than 1 drink a week       ROS:  negative except as mentioned in HPI    Exam  There were no vitals taken for this virtual visit.  ENDO VITALS-UMP 2020   Weight 79.833 kg   Weight 176 lb   Height 69 in   /83   Pulse 68   Resp 16   BSA (Calculated - sq m) 1.97   BMI (Calculated) 25.99   Temp 97.9   Temp src Oral   SpO2 93     Gen: well appearing, nad, pleasant and conversant  HEENT: anicteric, EOMI, no proptosis or lid lag, no goiter  Neuro: A&O    Labs/Imaging    See HPI    Assessment and Plan    ED, lack of spontaneous erections is concerning, with testosterone in low end of normal range. He could benefit from early morning (testosterone peaks in early am) labs to evaluate gonadotroph function, prolactin, thyroid axis. Refer to urology for lack of spontaneous erections and concern for vascular etiology.    Labs: see orders    RTC: TBD based on results    Ling Jimenez MD, MPH  Attending Physician  Diabetes/Endocrinology/Metabolism    Time: 30 min spent on evaluation, management, counseling, education, & motivational interviewing with greater than 50% of the total time was spent on counseling and coordinating care      Video-Visit Details    Type of service:  Video Visit    Video Duration: 15 minutes    Originating Location (pt. Location): Home    Distant Location (provider location):  [facility]     Platform used for Video Visit: [employer]

## 2020-08-05 NOTE — LETTER
"8/5/2020       RE: Fabrice Donahue  [address]  [place] [address]     Dear Colleague,    Thank you for referring your patient, Fabrice Donahue, to the [facility] ENDOCRINOLOGY at [facility]. Please see a copy of my visit note below.    Fabrice Donahue is a 54 year old male who is being evaluated via a billable video visit.      The patient has been notified of following:     \"This video visit will be conducted via a call between you and your physician/provider. We have found that certain health care needs can be provided without the need for an in-person physical exam.  This service lets us provide the care you need with a video conversation.  If a prescription is necessary we can send it directly to your pharmacy.  If lab work is needed we can place an order for that and you can then stop by our lab to have the test done at a later time.    Video visits are billed at different rates depending on your insurance coverage.  Please reach out to your insurance provider with any questions.    If during the course of the call the physician/provider feels a video visit is not appropriate, you will not be charged for this service.\"    Patient has given verbal consent for Video visit? Yes  How would you like to obtain your AVS? Mail a copy  If you are dropped from the video visit, the video invite should be resent to: Send to e-mail at: [email or web address]  Will anyone else be joining your video visit? No    Reason for visit/consult: ED    Primary care provider: Wegener, Joel Daniel Irwin    HPI:  55 yo male seen at consult request of Dr. Wegener for evaluation & management of ED, low libido, and hypogonadism with low-rosana, yet normal range testosterone of 303 on 7/23/20 collected at 9:58am and 244 a month prior and was 349 about 4 yrs prior. He has had noticed a slow decline in function. He has been taking sildenafil (Revatio) 20 mg recently and this has not been helping. He does not have " spontaneous morning erections x past 3-4 months. He has 4 kids and did not have any issues with fertility. He has not been on testosterone replacement before. Libido is almost non-existent, started to notice a gradual decline over past 5-10 years. Last intercourse was several months ago and it was not to his satisfaction. He has some anxiety.  No chest pain or shortness of breath or CVD.    Past Medical/Surgical History:  Past Medical History:   Diagnosis Date     Benign neoplasm of skin of other and unspecified parts of face     atypical nevus     Essential hypertension, benign      Lyme disease 8/2007    hot welts, bull eye bruise     Mixed hyperlipidemia      Other chronic nonalcoholic liver disease 2006    Fatty liver     Shoulder pain, right     post clavicular fx     Syncope 7/08    neg Head/Neck CT     Unspecified hemorrhoids without mention of complication      Past Surgical History:   Procedure Laterality Date     C ECHO HEART XTHORACIC,COMPLETE, W/O DOPPLER  8/08    Nl     COLONOSCOPY  04/2014     COLONOSCOPY N/A 8/26/2019    Procedure: COLONOSCOPY, WITH POLYPECTOMY;  Surgeon: José Baig MD;  [facility]     HC US ABDOMEN COMPLETE  6/2006    fatty liver     HERNIA REPAIR  1984     SURGICAL HISTORY OF -   1982    L Inguinal Hernia     SURGICAL HISTORY OF -   8/09    R Clavicle Fracture Repair       Allergies:  Allergies   Allergen Reactions     Ivp Dye [Contrast Dye]        Current Medications   Current Outpatient Medications   Medication     lisinopril (ZESTRIL) 10 MG tablet     Multiple Vitamin (MULTI-VITAMIN) per tablet     pravastatin (PRAVACHOL) 20 MG tablet     sildenafil (REVATIO) 20 MG tablet     No current facility-administered medications for this visit.        Family History:  Family History   Problem Relation Age of Onset     Hypertension Mother      Cancer Mother         kidney     Lipids Mother         high     Thyroid Disease Mother         hyper     Hyperlipidemia Mother       Respiratory Father         emphysema     Hypertension Maternal Grandmother      Cerebrovascular Disease Maternal Grandmother      Lipids Sister         high     Hypertension Sister      Lipids Brother         high     Hypertension Brother      Diabetes Maternal Aunt         type II     Hypertension Sister        Social History: . 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 Rare alcohol use. No drug use.  Social History     Tobacco Use     Smoking status: Former Smoker     Packs/day: 0.50     Years: 5.00     Pack years: 2.50     Types: Cigarettes     Last attempt to quit: 10/1/2010     Years since quittin.8     Smokeless tobacco: Never Used   Substance Use Topics     Alcohol use: Yes     Comment: Less than 1 drink a week       ROS:  negative except as mentioned in HPI    Exam  There were no vitals taken for this virtual visit.  ENDO VITALS-UMP 2020   Weight 79.833 kg   Weight 176 lb   Height 69 in   /83   Pulse 68   Resp 16   BSA (Calculated - sq m) 1.97   BMI (Calculated) 25.99   Temp 97.9   Temp src Oral   SpO2 93     Gen: well appearing, nad, pleasant and conversant  HEENT: anicteric, EOMI, no proptosis or lid lag, no goiter  Neuro: A&O    Labs/Imaging    See HPI    Assessment and Plan    ED, lack of spontaneous erections is concerning, with testosterone in low end of normal range. He could benefit from early morning (testosterone peaks in early am) labs to evaluate gonadotroph function, prolactin, thyroid axis. Refer to urology for lack of spontaneous erections and concern for vascular etiology.    Labs: see orders    RTC: TBD based on results    Ling Jimenez MD, MPH  Attending Physician  Diabetes/Endocrinology/Metabolism    Time: 30 min spent on evaluation, management, counseling, education, & motivational interviewing with greater than 50% of the total time was spent on counseling and coordinating care    Video-Visit Details    Type of  service:  Video Visit    Video Duration: 15 minutes    Originating Location (pt. Location): Home    Distant Location (provider location):  [facility] ENDOCRINOLOGY     Platform used for Video Visit: [employer]

## 2020-08-07 NOTE — TELEPHONE ENCOUNTER
MEDICAL RECORDS REQUEST   [facility]  Urology Clinic  [address] [place] [address]  PHONE: [phone]  Fax: [phone]        FUTURE VISIT INFORMATION                                                   Fabrice Donahue : 1965 scheduled for future visit at [facility]    APPOINTMENT INFORMATION:    Date: 9/15/20 4PM    Provider:   Mikki Borja RN    Reason for Visit/Diagnosis: ED issues     REFERRAL INFORMATION:    Referring provider:  ELFEGO DREW    Specialty: MD    Referring providers clinic:  [facility]    Clinic contact number:  [phone]    RECORDS REQUESTED FOR VISIT                                                     NOTES  STATUS/DETAILS   OFFICE NOTE from referring provider  yes   OFFICE NOTE from other specialist  no   DISCHARGE SUMMARY from hospital  no   DISCHARGE REPORT from the ER  no   OPERATIVE REPORT  no   MEDICATION LIST  no     PRE-VISIT CHECKLIST      Record collection complete Yes   Appointment appropriately scheduled           (right time/right provider) Yes   MyChart activation Yes   Questionnaire complete If no, please explain: In process      Completed by: Nicole Vasquez

## 2020-08-14 DIAGNOSIS — E29.1 HYPOGONADISM MALE: ICD-10-CM

## 2020-08-14 DIAGNOSIS — D72.820 LYMPHOCYTOSIS: Primary | ICD-10-CM

## 2020-08-14 DIAGNOSIS — R68.82 LOW LIBIDO: ICD-10-CM

## 2020-08-14 DIAGNOSIS — N52.8 OTHER MALE ERECTILE DYSFUNCTION: ICD-10-CM

## 2020-08-14 LAB
BASOPHILS # BLD AUTO: 0.1 10E9/L (ref 0–0.2)
BASOPHILS NFR BLD AUTO: 0.7 %
CORTIS SERPL-MCNC: 12 UG/DL (ref 4–22)
DIFFERENTIAL METHOD BLD: ABNORMAL
EOSINOPHIL # BLD AUTO: 0.5 10E9/L (ref 0–0.7)
EOSINOPHIL NFR BLD AUTO: 4 %
FSH SERPL-ACNC: 5.2 IU/L (ref 0.7–10.8)
HGB BLD-MCNC: 14.9 G/DL (ref 13.3–17.7)
IMM GRANULOCYTES # BLD: 0 10E9/L (ref 0–0.4)
IMM GRANULOCYTES NFR BLD: 0.2 %
LH SERPL-ACNC: 4 IU/L (ref 1.5–9.3)
LYMPHOCYTES # BLD AUTO: 8.5 10E9/L (ref 0.8–5.3)
LYMPHOCYTES NFR BLD AUTO: 70 %
MONOCYTES # BLD AUTO: 0.7 10E9/L (ref 0–1.3)
MONOCYTES NFR BLD AUTO: 6 %
NEUTROPHILS # BLD AUTO: 2.3 10E9/L (ref 1.6–8.3)
NEUTROPHILS NFR BLD AUTO: 19.1 %
NRBC # BLD AUTO: 0 10*3/UL
NRBC BLD AUTO-RTO: 0 /100
PLATELET # BLD EST: NORMAL 10*3/UL
PROLACTIN SERPL-MCNC: 12 UG/L (ref 2–18)
PSA SERPL-MCNC: 0.51 UG/L (ref 0–4)
RBC MORPH BLD: ABNORMAL
T4 FREE SERPL-MCNC: 1.07 NG/DL (ref 0.76–1.46)
TSH SERPL DL<=0.005 MIU/L-ACNC: 3.03 MU/L (ref 0.4–4)
WBC # BLD AUTO: 12.1 10E9/L (ref 4–11)

## 2020-08-14 PROCEDURE — 84146 ASSAY OF PROLACTIN: CPT | Performed by: INTERNAL MEDICINE

## 2020-08-14 PROCEDURE — 84403 ASSAY OF TOTAL TESTOSTERONE: CPT | Performed by: INTERNAL MEDICINE

## 2020-08-14 PROCEDURE — 84153 ASSAY OF PSA TOTAL: CPT | Performed by: INTERNAL MEDICINE

## 2020-08-14 PROCEDURE — 85004 AUTOMATED DIFF WBC COUNT: CPT | Performed by: FAMILY MEDICINE

## 2020-08-14 PROCEDURE — 83002 ASSAY OF GONADOTROPIN (LH): CPT | Performed by: INTERNAL MEDICINE

## 2020-08-14 PROCEDURE — 82533 TOTAL CORTISOL: CPT | Performed by: INTERNAL MEDICINE

## 2020-08-14 PROCEDURE — 84439 ASSAY OF FREE THYROXINE: CPT | Performed by: INTERNAL MEDICINE

## 2020-08-14 PROCEDURE — 36415 COLL VENOUS BLD VENIPUNCTURE: CPT | Performed by: INTERNAL MEDICINE

## 2020-08-14 PROCEDURE — 84270 ASSAY OF SEX HORMONE GLOBUL: CPT | Performed by: INTERNAL MEDICINE

## 2020-08-14 PROCEDURE — 85048 AUTOMATED LEUKOCYTE COUNT: CPT | Performed by: FAMILY MEDICINE

## 2020-08-14 PROCEDURE — 84443 ASSAY THYROID STIM HORMONE: CPT | Performed by: INTERNAL MEDICINE

## 2020-08-14 PROCEDURE — 83001 ASSAY OF GONADOTROPIN (FSH): CPT | Performed by: INTERNAL MEDICINE

## 2020-08-14 PROCEDURE — 82024 ASSAY OF ACTH: CPT | Performed by: INTERNAL MEDICINE

## 2020-08-14 PROCEDURE — 85018 HEMOGLOBIN: CPT | Performed by: INTERNAL MEDICINE

## 2020-08-18 LAB
ACTH PLAS-MCNC: 27 PG/ML
SHBG SERPL-SCNC: 33 NMOL/L (ref 11–80)
TESTOST FREE SERPL-MCNC: 6.86 NG/DL (ref 4.7–24.4)
TESTOST SERPL-MCNC: 344 NG/DL (ref 240–950)

## 2020-09-09 ENCOUNTER — PRE VISIT (OUTPATIENT)
Dept: UROLOGY | Facility: CLINIC | Age: 55
End: 2020-09-09

## 2020-09-09 NOTE — TELEPHONE ENCOUNTER
Reason for visit: ED consult     Relevant information: hypogonadism     Records/imaging/labs/orders: records available    Pt called: no need for a call    At Rooming: standard

## 2020-09-15 ENCOUNTER — OFFICE VISIT (OUTPATIENT)
Dept: UROLOGY | Facility: CLINIC | Age: 55
End: 2020-09-15
Attending: INTERNAL MEDICINE
Payer: COMMERCIAL

## 2020-09-15 ENCOUNTER — PRE VISIT (OUTPATIENT)
Dept: UROLOGY | Facility: CLINIC | Age: 55
End: 2020-09-15

## 2020-09-15 VITALS
HEART RATE: 79 BPM | SYSTOLIC BLOOD PRESSURE: 137 MMHG | DIASTOLIC BLOOD PRESSURE: 86 MMHG | HEIGHT: 69 IN | BODY MASS INDEX: 25.48 KG/M2 | WEIGHT: 172 LBS

## 2020-09-15 DIAGNOSIS — N52.9 ERECTILE DYSFUNCTION, UNSPECIFIED ERECTILE DYSFUNCTION TYPE: ICD-10-CM

## 2020-09-15 RX ORDER — SILDENAFIL CITRATE 20 MG/1
TABLET ORAL
Qty: 20 TABLET | Refills: 11 | Status: SHIPPED | OUTPATIENT
Start: 2020-09-15 | End: 2021-07-12

## 2020-09-15 ASSESSMENT — MIFFLIN-ST. JEOR: SCORE: 1595.57

## 2020-09-15 ASSESSMENT — PAIN SCALES - GENERAL: PAINLEVEL: NO PAIN (0)

## 2020-09-15 NOTE — NURSING NOTE
"Chief Complaint   Patient presents with     Consult     ED       Blood pressure 137/86, pulse 79, height 1.753 m (5' 9\"), weight 78 kg (172 lb). Body mass index is 25.4 kg/m .    Patient Active Problem List   Diagnosis     Fatty liver     HYPERLIPIDEMIA LDL GOAL <130     Overweight (BMI 25.0-29.9)     Hypertension goal BP (blood pressure) < 140/90     Psychophysiologic insomnia       Allergies   Allergen Reactions     Ivp Dye [Contrast Dye]        Current Outpatient Medications   Medication Sig Dispense Refill     lisinopril (ZESTRIL) 10 MG tablet Take 1 tablet (10 mg) by mouth daily 90 tablet 3     Multiple Vitamin (MULTI-VITAMIN) per tablet Take 1 tablet by mouth daily. 100 tablet 12     pravastatin (PRAVACHOL) 20 MG tablet Take 1 tablet (20 mg) by mouth daily 90 tablet 3     sildenafil (REVATIO) 20 MG tablet 1-2 tablets prior to sexual activity daily as needed. 20 tablet 11       Social History     Tobacco Use     Smoking status: Former Smoker     Packs/day: 0.50     Years: 5.00     Pack years: 2.50     Types: Cigarettes     Last attempt to quit: 10/1/2010     Years since quittin.9     Smokeless tobacco: Never Used     Tobacco comment: Already quit smoking   Substance Use Topics     Alcohol use: Yes     Comment: Less than 1 drink a week; occasional beer     Drug use: No       Caty Sawyer LPN  9/15/2020  3:31 PM  "

## 2020-09-15 NOTE — PROGRESS NOTES
"        Chief Complaint:   Erectile dysfunction          History of Present Illness:    Fabrice Donahue is a very pleasant 55 year old male who presents for evaluation of ED, low libido, and hypogonadism. Per chart review, was recently evaluated by endocrine, Dr. Jimenez, and his testosterone was noted to be on the low end of normal. Has noticed a slow decline in function. Has been taking sildenafil 20 mg and felt that this has not been helping. Has not had spontaneous morning erections for the past 3-4 months. Has four children.    Today, he states that he has had \"minimal\" effect from the sildenafil. Has only taken a max dose of 40 mg. He has not self-stimulated after taking this, and was not aware that this may be a necessary step.       ED/Vascular disease risk factors:  HTN: Yes  Hyperlipidemia: Yes  Smoking: No   DM: No  Cardiovascular disease: None known  Meds associated with ED that he's taking: None  Anxiety/anger/depression: Possible, per patient  Penile Plaques or curvature: None.          Past Medical History:     Past Medical History:   Diagnosis Date     Benign neoplasm of skin of other and unspecified parts of face     atypical nevus     Essential hypertension, benign      Lyme disease 8/2007    hot welts, bull eye bruise     Mixed hyperlipidemia      Other chronic nonalcoholic liver disease 2006    Fatty liver     Shoulder pain, right     post clavicular fx     Syncope 7/08    neg Head/Neck CT     Unspecified hemorrhoids without mention of complication             Past Surgical History:     Past Surgical History:   Procedure Laterality Date     C ECHO HEART XTHORACIC,COMPLETE, W/O DOPPLER  8/08    Nl     COLONOSCOPY  04/2014     COLONOSCOPY N/A 8/26/2019    Procedure: COLONOSCOPY, WITH POLYPECTOMY;  Surgeon: José Baig MD;  Location:  OR      US ABDOMEN COMPLETE  6/2006    fatty liver     HERNIA REPAIR  1984     SURGICAL HISTORY OF -   1982    L Inguinal Hernia     SURGICAL HISTORY OF -   8/09 "    R Clavicle Fracture Repair            Medications     Current Outpatient Medications   Medication     lisinopril (ZESTRIL) 10 MG tablet     Multiple Vitamin (MULTI-VITAMIN) per tablet     pravastatin (PRAVACHOL) 20 MG tablet     sildenafil (REVATIO) 20 MG tablet     No current facility-administered medications for this visit.             Family History:     Family History   Problem Relation Age of Onset     Hypertension Mother      Cancer Mother         kidney     Lipids Mother         high     Thyroid Disease Mother         hyper     Hyperlipidemia Mother      Respiratory Father         emphysema     Hypertension Maternal Grandmother      Cerebrovascular Disease Maternal Grandmother      Lipids Sister         high     Hypertension Sister      Lipids Brother         high     Hypertension Brother      Diabetes Maternal Aunt         type II     Hypertension Sister             Social History:     Social History     Socioeconomic History     Marital status:      Spouse name: Not on file     Number of children: 4     Years of education: Not on file     Highest education level: Not on file   Occupational History     Employer: [employer]   Social Needs     Financial resource strain: Not on file     Food insecurity     Worry: Not on file     Inability: Not on file     Transportation needs     Medical: Not on file     Non-medical: Not on file   Tobacco Use     Smoking status: Former Smoker     Packs/day: 0.50     Years: 5.00     Pack years: 2.50     Types: Cigarettes     Last attempt to quit: 10/1/2010     Years since quittin.9     Smokeless tobacco: Never Used   Substance and Sexual Activity     Alcohol use: Yes     Comment: Less than 1 drink a week     Drug use: No     Sexual activity: Yes     Partners: Female     Birth control/protection: Male Surgical     Comment: high blood pressure   Lifestyle     Physical activity     Days per week: Not on file     Minutes per session: Not on file     Stress: Not on file    Relationships     Social connections     Talks on phone: Not on file     Gets together: Not on file     Attends Spiritism service: Not on file     Active member of club or organization: Not on file     Attends meetings of clubs or organizations: Not on file     Relationship status: Not on file     Intimate partner violence     Fear of current or ex partner: Not on file     Emotionally abused: Not on file     Physically abused: Not on file     Forced sexual activity: Not on file   Other Topics Concern     Parent/sibling w/ CABG, MI or angioplasty before 65F 55M? No   Social History Narrative    Dairy/d 2 servings/d.     Caffeine 2-3 servings/d    Exercise 2 x week - walking    Sunscreen used - Yes    Seatbelts used - Yes    Working smoke/CO detectors in the home - Yes    Guns stored in the home - No    Self Breast Exams - NA    Self Testicular Exam -yes    Eye Exam up to date - Yes    Dental Exam up to date - Yes    Pap Smear up to date - NA    Mammogram up to date - NA    PSA up to date no    Dexa Scan up to date - NA    Flex Sig / Colonoscopy up to date - NA    Immunizations up to date - Yes 7/24/00    Abuse: Current or Past(Physical, Sexual or Emotional)- No    Do you feel safe in your environment - Yes    9/4/07            Allergies:   Ivp dye [contrast dye]         Review of Systems:  From intake questionnaire   Negative 14 system review except as noted on HPI, nurse's note.         Physical Exam:   Patient is a 55 year old male   Vitals: There were no vitals taken for this visit.  General Appearance Adult: Alert, no acute distress, oriented  Lungs: no respiratory distress, or pursed lip breathing  Heart: No obvious jugular venous distension present  : deferred      Labs and Pathology:    I personally reviewed all applicable laboratory data and went over findings with patient  Significant for:    CBC RESULTS:  Recent Labs   Lab Test 08/14/20  0916 08/14/20  0730   WBC 12.1*  --    HGB  --  14.9        BMP  RESULTS:  Recent Labs   Lab Test 07/07/20  1051 04/19/19  0819 03/26/19  1549 01/09/18  0928 02/28/17  0840 02/22/16  0834    139  --  138  --  140   POTASSIUM 4.2 4.2 4.1 4.3  --  4.2   CHLORIDE 104 105  --  103  --  104   CO2 26 28  --  29  --  31   ANIONGAP 7 6  --  6  --  5   * 109*  --  95 98 92   BUN 13 13  --  11  --  15   CR 0.80 0.74 0.70 0.71  --  0.64*   GFRESTIMATED >90 >90 >90 >90  --  >90  Non  GFR Calc     GFRESTBLACK >90 >90 >90 >90  --  >90  African American GFR Calc     CANDI 8.9 9.3  --  9.3  --  9.1       PSA RESULTS  PSA   Date Value Ref Range Status   08/14/2020 0.51 0 - 4 ug/L Final     Comment:     Assay Method:  Chemiluminescence using Siemens Vista analyzer   02/17/2015 0.78 0 - 4 ug/L Final   01/20/2014 1.10 0 - 4 ug/L Final   02/13/2012 0.70 0 - 4 ug/L Final   10/18/2010 0.80 0 - 4 ug/L Final            Assessment and Plan:     Assessment: 55 year old male with ED and low libido for the past few years. Testerone noted to be on the lower end of normal range. Has tried sildenafil 40 mg, but has not self-stimulated after taking. We discussed that this is an important step to evaluate effectiveness and he will plan to try it again. We discussed his risk factors for ED, including HTN and HLD. Also discussed possible causes of low libido, including psychological (depression, anxiety, stress), low testosterone (his being on the lower end of normal), medications (is not taking SSRIs, etc.) chronic opioid use (not applicable) and inter partner issues (he denies). He does acknowledge that he may have some underlying depression and anxiety, although this is difficult to determine, given the lifestyle impact of the COVID-19 crisis. Discussed the potential utility of a referral to the [facility], and he is interested.     We discussed next steps for ED management if the sildenafil is ineffective, including ICI. He is hesitant to consider this, but will plan to  think about it.     Plan:  -Trial sildenafil again. Refill sent.  -Follow up with the [facility].  -Follow up with Dr. Ayoub for further evaluation, if needed, or if interested in ICI.       Mikki Borja, CNP  Department of Urology

## 2020-09-15 NOTE — PATIENT INSTRUCTIONS
UROLOGY CLINIC VISIT PATIENT INSTRUCTIONS    1) Trial the sildenafil again- can take up to a max of 100 mg. I have sent refills to your pharmacy.   2) I have placed a referral to the [facility] so that you can consider seeing them whenever you'd like.  3) Follow up with Dr. Ayoub for further evaluation or if interested in pursuing injection therapy.     If you have any issues, questions or concerns in the meantime, do not hesitate to contact us at [phone] or via [employer].     It was a pleasure meeting with you today.  Thank you for allowing me and my team the privilege of caring for you today.  YOU are the reason we are here, and I truly hope we provided you with the excellent service you deserve.  Please let us know if there is anything else we can do for you so that we can be sure you are leaving completely satisfied with your care experience.    Mikki Borja, CNP

## 2020-09-15 NOTE — LETTER
"9/15/2020       RE: Fabrice Donahue  [address] [facility] [id number]     Dear Colleague,    Thank you for referring your patient, Fabrice Donahue, to the [facility] UROLOGY AND INST FOR PROSTATE AND UROLOGIC CANCERS at [facility]. Please see a copy of my visit note below.            Chief Complaint:   Erectile dysfunction          History of Present Illness:    Fabrice Donahue is a very pleasant 55 year old male who presents for evaluation of ED, low libido, and hypogonadism. Per chart review, was recently evaluated by endocrine, Dr. Jimenez, and his testosterone was noted to be on the low end of normal. Has noticed a slow decline in function. Has been taking sildenafil 20 mg and felt that this has not been helping. Has not had spontaneous morning erections for the past 3-4 months. Has four children.    Today, he states that he has had \"minimal\" effect from the sildenafil. Has only taken a max dose of 40 mg. He has not self-stimulated after taking this, and was not aware that this may be a necessary step.       ED/Vascular disease risk factors:  HTN: Yes  Hyperlipidemia: Yes  Smoking: No   DM: No  Cardiovascular disease: None known  Meds associated with ED that he's taking: None  Anxiety/anger/depression: Possible, per patient  Penile Plaques or curvature: None.          Past Medical History:     Past Medical History:   Diagnosis Date     Benign neoplasm of skin of other and unspecified parts of face     atypical nevus     Essential hypertension, benign      Lyme disease 8/2007    hot welts, bull eye bruise     Mixed hyperlipidemia      Other chronic nonalcoholic liver disease 2006    Fatty liver     Shoulder pain, right     post clavicular fx     Syncope 7/08    neg Head/Neck CT     Unspecified hemorrhoids without mention of complication             Past Surgical History:     Past Surgical History:   Procedure Laterality Date     C ECHO HEART XTHORACIC,COMPLETE, W/O DOPPLER  8/08 "    Nl     COLONOSCOPY  2014     COLONOSCOPY N/A 2019    Procedure: COLONOSCOPY, WITH POLYPECTOMY;  Surgeon: José Baig MD;  Location: [facility] OR      US ABDOMEN COMPLETE  2006    fatty liver     HERNIA REPAIR       SURGICAL HISTORY OF -       L Inguinal Hernia     SURGICAL HISTORY OF -       R Clavicle Fracture Repair            Medications     Current Outpatient Medications   Medication     lisinopril (ZESTRIL) 10 MG tablet     Multiple Vitamin (MULTI-VITAMIN) per tablet     pravastatin (PRAVACHOL) 20 MG tablet     sildenafil (REVATIO) 20 MG tablet     No current facility-administered medications for this visit.             Family History:     Family History   Problem Relation Age of Onset     Hypertension Mother      Cancer Mother         kidney     Lipids Mother         high     Thyroid Disease Mother         hyper     Hyperlipidemia Mother      Respiratory Father         emphysema     Hypertension Maternal Grandmother      Cerebrovascular Disease Maternal Grandmother      Lipids Sister         high     Hypertension Sister      Lipids Brother         high     Hypertension Brother      Diabetes Maternal Aunt         type II     Hypertension Sister             Social History:     Social History     Socioeconomic History     Marital status:      Spouse name: Not on file     Number of children: 4     Years of education: Not on file     Highest education level: Not on file   Occupational History     Employer: [employer]   Social Needs     Financial resource strain: Not on file     Food insecurity     Worry: Not on file     Inability: Not on file     Transportation needs     Medical: Not on file     Non-medical: Not on file   Tobacco Use     Smoking status: Former Smoker     Packs/day: 0.50     Years: 5.00     Pack years: 2.50     Types: Cigarettes     Last attempt to quit: 10/1/2010     Years since quittin.9     Smokeless tobacco: Never Used   Substance and Sexual Activity     Alcohol  use: Yes     Comment: Less than 1 drink a week     Drug use: No     Sexual activity: Yes     Partners: Female     Birth control/protection: Male Surgical     Comment: high blood pressure   Lifestyle     Physical activity     Days per week: Not on file     Minutes per session: Not on file     Stress: Not on file   Relationships     Social connections     Talks on phone: Not on file     Gets together: Not on file     Attends Episcopalian service: Not on file     Active member of club or organization: Not on file     Attends meetings of clubs or organizations: Not on file     Relationship status: Not on file     Intimate partner violence     Fear of current or ex partner: Not on file     Emotionally abused: Not on file     Physically abused: Not on file     Forced sexual activity: Not on file   Other Topics Concern     Parent/sibling w/ CABG, MI or angioplasty before 65F 55M? No   Social History Narrative    Dairy/d 2 servings/d.     Caffeine 2-3 servings/d    Exercise 2 x week - walking    Sunscreen used - Yes    Seatbelts used - Yes    Working smoke/CO detectors in the home - Yes    Guns stored in the home - No    Self Breast Exams - NA    Self Testicular Exam -yes    Eye Exam up to date - Yes    Dental Exam up to date - Yes    Pap Smear up to date - NA    Mammogram up to date - NA    PSA up to date no    Dexa Scan up to date - NA    Flex Sig / Colonoscopy up to date - NA    Immunizations up to date - Yes 7/24/00    Abuse: Current or Past(Physical, Sexual or Emotional)- No    Do you feel safe in your environment - Yes    9/4/07            Allergies:   Ivp dye [contrast dye]         Review of Systems:  From intake questionnaire   Negative 14 system review except as noted on HPI, nurse's note.         Physical Exam:   Patient is a 55 year old male   Vitals: There were no vitals taken for this visit.  General Appearance Adult: Alert, no acute distress, oriented  Lungs: no respiratory distress, or pursed lip  breathing  Heart: No obvious jugular venous distension present  : deferred      Labs and Pathology:    I personally reviewed all applicable laboratory data and went over findings with patient  Significant for:    CBC RESULTS:  Recent Labs   Lab Test 08/14/20  0916 08/14/20  0730   WBC 12.1*  --    HGB  --  14.9        BMP RESULTS:  Recent Labs   Lab Test 07/07/20  1051 04/19/19  0819 03/26/19  1549 01/09/18  0928 02/28/17  0840 02/22/16  0834    139  --  138  --  140   POTASSIUM 4.2 4.2 4.1 4.3  --  4.2   CHLORIDE 104 105  --  103  --  104   CO2 26 28  --  29  --  31   ANIONGAP 7 6  --  6  --  5   * 109*  --  95 98 92   BUN 13 13  --  11  --  15   CR 0.80 0.74 0.70 0.71  --  0.64*   GFRESTIMATED >90 >90 >90 >90  --  >90  Non  GFR Calc     GFRESTBLACK >90 >90 >90 >90  --  >90  African American GFR Calc     CANDI 8.9 9.3  --  9.3  --  9.1       PSA RESULTS  PSA   Date Value Ref Range Status   08/14/2020 0.51 0 - 4 ug/L Final     Comment:     Assay Method:  Chemiluminescence using Siemens Vista analyzer   02/17/2015 0.78 0 - 4 ug/L Final   01/20/2014 1.10 0 - 4 ug/L Final   02/13/2012 0.70 0 - 4 ug/L Final   10/18/2010 0.80 0 - 4 ug/L Final            Assessment and Plan:     Assessment: 55 year old male with ED and low libido for the past few years. Testerone noted to be on the lower end of normal range. Has tried sildenafil 40 mg, but has not self-stimulated after taking. We discussed that this is an important step to evaluate effectiveness and he will plan to try it again. We discussed his risk factors for ED, including HTN and HLD. Also discussed possible causes of low libido, including psychological (depression, anxiety, stress), low testosterone (his being on the lower end of normal), medications (is not taking SSRIs, etc.) chronic opioid use (not applicable) and inter partner issues (he denies). He does acknowledge that he may have some underlying depression and anxiety, although  this is difficult to determine, given the lifestyle impact of the COVID-19 crisis. Discussed the potential utility of a referral to the [facility], and he is interested.     We discussed next steps for ED management if the sildenafil is ineffective, including ICI. He is hesitant to consider this, but will plan to think about it.     Plan:  -Trial sildenafil again. Refill sent.  -Follow up with the [facility].  -Follow up with Dr. Ayoub for further evaluation, if needed, or if interested in ICI.       Mikki Borja, CNP  Department of Urology

## 2021-01-15 ENCOUNTER — HEALTH MAINTENANCE LETTER (OUTPATIENT)
Age: 56
End: 2021-01-15

## 2021-04-22 ENCOUNTER — OFFICE VISIT (OUTPATIENT)
Dept: URGENT CARE | Facility: URGENT CARE | Age: 56
End: 2021-04-22
Payer: COMMERCIAL

## 2021-04-22 VITALS
WEIGHT: 172 LBS | TEMPERATURE: 98 F | BODY MASS INDEX: 25.4 KG/M2 | DIASTOLIC BLOOD PRESSURE: 70 MMHG | OXYGEN SATURATION: 96 % | SYSTOLIC BLOOD PRESSURE: 118 MMHG | HEART RATE: 76 BPM

## 2021-04-22 DIAGNOSIS — S80.10XA CONTUSION OF KNEE AND LOWER LEG, INITIAL ENCOUNTER: Primary | ICD-10-CM

## 2021-04-22 DIAGNOSIS — S80.00XA CONTUSION OF KNEE AND LOWER LEG, INITIAL ENCOUNTER: Primary | ICD-10-CM

## 2021-04-22 PROCEDURE — 99213 OFFICE O/P EST LOW 20 MIN: CPT | Performed by: FAMILY MEDICINE

## 2021-04-22 RX ORDER — LISINOPRIL 40 MG/1
TABLET ORAL
COMMUNITY
Start: 2020-04-23 | End: 2021-05-14

## 2021-04-22 NOTE — PROGRESS NOTES
SUBJECTIVE:  CHIEF COMPLAINT: contusion to the rt anterior leg.  Note swelling and tenderness, but does not recall bumping his leg.  His dog got in scuffle which he had to break up, but does not recall hitting his leg.    The injury occurred 1 day ago.  Work related: no  Location of the injury:  legs  History of injury: see above  Treatments used to improve symptoms: none    OBJECTIVE:  EXAM:  Patient appears in alert distress.  VITALS: Blood pressure 118/70, pulse 76, temperature 98  F (36.7  C), temperature source Oral, weight 78 kg (172 lb), SpO2 96 %.  Examination of the rt leg:       Inspection: swelling seen, moderate and no echymoses seen       Palpation: mild palpable tenderness       ROM: good range of motion       Strength: excellent strength in all motions    ASSESSMENT:  Contusion to the rt anterior leg with hematoma(superficial)    PLAN:  Ice  Elevation  Rest  Active ROM exercises as pain permits  Follow up with primary care provider if no improvement.

## 2021-04-23 ENCOUNTER — TELEPHONE (OUTPATIENT)
Dept: FAMILY MEDICINE | Facility: CLINIC | Age: 56
End: 2021-04-23

## 2021-04-29 ENCOUNTER — MYC MEDICAL ADVICE (OUTPATIENT)
Dept: FAMILY MEDICINE | Facility: CLINIC | Age: 56
End: 2021-04-29

## 2021-05-09 ENCOUNTER — MYC MEDICAL ADVICE (OUTPATIENT)
Dept: FAMILY MEDICINE | Facility: CLINIC | Age: 56
End: 2021-05-09

## 2021-05-10 NOTE — TELEPHONE ENCOUNTER
JW,    Please see [employer] message below.  Patient seen in  on 4/22.    Thanks,  Paula Xiong RN

## 2021-05-12 NOTE — PROGRESS NOTES
Assessment & Plan     Hematoma of skin  Nearly resolved.  Improved a lot even since making appt with me.     Still 2cm area of palpable scar right anterior shin/tibia but little tenderness.  No skin changes/color changes, no calf pain or mass.     Ok to resume full activity.     Follow up July for yearly physical.     Keep up the great job on exercise!                        Return in 2 months (on 7/13/2021) for wellness/physical.    Joel Daniel Wegener, MD  [facility]PG[facility] SpringFARIDA Avina is a 55 year old who presents for the following health issues     HPI     Concern - Contusion/Hematoma on Shin  Onset: 4/12/2021 was the date of the original injury  Urgent care note reviewed/accurate.         Review of Systems         Objective    There were no vitals taken for this visit.  There is no height or weight on file to calculate BMI.  Physical Exam     See plan for additional hpi and exam.                 Joel Wegener,MD

## 2021-05-14 ENCOUNTER — OFFICE VISIT (OUTPATIENT)
Dept: FAMILY MEDICINE | Facility: CLINIC | Age: 56
End: 2021-05-14
Payer: COMMERCIAL

## 2021-05-14 VITALS
OXYGEN SATURATION: 96 % | BODY MASS INDEX: 24.57 KG/M2 | HEIGHT: 69 IN | SYSTOLIC BLOOD PRESSURE: 124 MMHG | DIASTOLIC BLOOD PRESSURE: 80 MMHG | WEIGHT: 165.9 LBS | HEART RATE: 71 BPM | RESPIRATION RATE: 15 BRPM | TEMPERATURE: 98.6 F

## 2021-05-14 DIAGNOSIS — T14.8XXA HEMATOMA OF SKIN: Primary | ICD-10-CM

## 2021-05-14 PROCEDURE — 99213 OFFICE O/P EST LOW 20 MIN: CPT | Performed by: FAMILY MEDICINE

## 2021-05-14 ASSESSMENT — MIFFLIN-ST. JEOR: SCORE: 1567.9

## 2021-06-25 DIAGNOSIS — E78.5 HYPERLIPIDEMIA LDL GOAL <130: ICD-10-CM

## 2021-06-28 RX ORDER — PRAVASTATIN SODIUM 20 MG
20 TABLET ORAL DAILY
Qty: 90 TABLET | Refills: 0 | Status: SHIPPED | OUTPATIENT
Start: 2021-06-28 | End: 2021-07-12

## 2021-06-30 DIAGNOSIS — I10 HYPERTENSION GOAL BP (BLOOD PRESSURE) < 140/90: ICD-10-CM

## 2021-07-01 RX ORDER — LISINOPRIL 10 MG/1
10 TABLET ORAL DAILY
Qty: 90 TABLET | Refills: 3 | Status: SHIPPED | OUTPATIENT
Start: 2021-07-01 | End: 2021-07-12

## 2021-07-02 ENCOUNTER — MYC MEDICAL ADVICE (OUTPATIENT)
Dept: FAMILY MEDICINE | Facility: CLINIC | Age: 56
End: 2021-07-02

## 2021-07-02 DIAGNOSIS — M27.8 MAXILLARY BONE LOSS: Primary | ICD-10-CM

## 2021-07-02 DIAGNOSIS — Z13.820 SCREENING FOR OSTEOPOROSIS: ICD-10-CM

## 2021-07-02 NOTE — TELEPHONE ENCOUNTER
JW,  Please see below "[employer] message and advise.  Thanks,  Judith MILNER RN

## 2021-07-05 ENCOUNTER — ANCILLARY PROCEDURE (OUTPATIENT)
Dept: BONE DENSITY | Facility: CLINIC | Age: 56
End: 2021-07-05
Attending: FAMILY MEDICINE
Payer: COMMERCIAL

## 2021-07-05 DIAGNOSIS — Z13.820 SCREENING FOR OSTEOPOROSIS: ICD-10-CM

## 2021-07-05 DIAGNOSIS — M27.8 MAXILLARY BONE LOSS: ICD-10-CM

## 2021-07-05 PROCEDURE — 77080 DXA BONE DENSITY AXIAL: CPT | Performed by: FAMILY MEDICINE

## 2021-07-06 ASSESSMENT — ENCOUNTER SYMPTOMS
ARTHRALGIAS: 0
NAUSEA: 0
HEARTBURN: 0
NERVOUS/ANXIOUS: 0
FEVER: 0
PALPITATIONS: 0
DYSURIA: 0
HEMATURIA: 0
DIARRHEA: 0
JOINT SWELLING: 0
ABDOMINAL PAIN: 0
CONSTIPATION: 0
HEMATOCHEZIA: 0
CHILLS: 0
EYE PAIN: 0
HEADACHES: 0
WEAKNESS: 0
DIZZINESS: 0
SHORTNESS OF BREATH: 0
MYALGIAS: 0
COUGH: 0
FREQUENCY: 1
SORE THROAT: 0
PARESTHESIAS: 0

## 2021-07-07 ASSESSMENT — ENCOUNTER SYMPTOMS
FREQUENCY: 1
ARTHRALGIAS: 0
DIARRHEA: 0
WEAKNESS: 0
CONSTIPATION: 0
NERVOUS/ANXIOUS: 0
MYALGIAS: 0
FEVER: 0
HEADACHES: 0
CHILLS: 0
PALPITATIONS: 0
HEMATOCHEZIA: 0
NAUSEA: 0
EYE PAIN: 0
SHORTNESS OF BREATH: 0
HEARTBURN: 0
HEMATURIA: 0
ABDOMINAL PAIN: 0
JOINT SWELLING: 0
SORE THROAT: 0
DYSURIA: 0
PARESTHESIAS: 0
COUGH: 0
DIZZINESS: 0

## 2021-07-07 NOTE — PROGRESS NOTES
SUBJECTIVE:   CC: Fabrice Donhaue is an 55 year old male who presents for preventative health visit.     Patient has been advised of split billing requirements and indicates understanding: Yes  Healthy Habits:     Getting at least 3 servings of Calcium per day:  Yes    Bi-annual eye exam:  Yes    Dental care twice a year:  Yes    Sleep apnea or symptoms of sleep apnea:  Daytime drowsiness    Diet:  Vegetarian/vegan    Frequency of exercise:  4-5 days/week    Duration of exercise:  45-60 minutes    Taking medications regularly:  Yes    Medication side effects:  None    PHQ-2 Total Score: 0    Additional concerns today:  Yes          PROBLEMS TO ADD ON...  Maxillary bone loss, follow up dexa scan. Continues increased biking/exercise/weight loss. Desires testosterone re-check.  Last august 2020 for low libido/ED.     Two pills 20mg sildenafil not adequate.         Today's PHQ-2 Score:   PHQ-2 ( 1999 [employer]) 7/6/2021   Q1: Little interest or pleasure in doing things 0   Q2: Feeling down, depressed or hopeless 0   PHQ-2 Score 0   Q1: Little interest or pleasure in doing things Not at all   Q2: Feeling down, depressed or hopeless Not at all   PHQ-2 Score 0       Abuse: Current or Past(Physical, Sexual or Emotional)- No  Do you feel safe in your environment? Yes        Social History     Tobacco Use     Smoking status: Former Smoker     Packs/day: 0.50     Years: 5.00     Pack years: 2.50     Types: Cigarettes     Quit date: 10/1/2010     Years since quitting: 10.7     Smokeless tobacco: Never Used     Tobacco comment: Already quit smoking   Substance Use Topics     Alcohol use: Yes     Comment: Less than 1 drink a week; occasional beer     If you drink alcohol do you typically have >3 drinks per day or >7 drinks per week? No    No flowsheet data found.    Last PSA:   PSA   Date Value Ref Range Status   08/14/2020 0.51 0 - 4 ug/L Final     Comment:     Assay Method:  Chemiluminescence using Siemens Vista analyzer        Reviewed orders with patient. Reviewed health maintenance and updated orders accordingly - Yes      Reviewed and updated as needed this visit by clinical staff  Tobacco  Allergies  Meds              Reviewed and updated as needed this visit by Provider                Past Medical History:   Diagnosis Date     Benign neoplasm of skin of other and unspecified parts of face     atypical nevus     Essential hypertension, benign      Lyme disease 8/2007    hot welts, bull eye bruise     Mixed hyperlipidemia      Other chronic nonalcoholic liver disease 2006    Fatty liver     Shoulder pain, right     post clavicular fx     Syncope 7/08    neg Head/Neck CT     Unspecified hemorrhoids without mention of complication       Past Surgical History:   Procedure Laterality Date     C ECHO HEART XTHORACIC,COMPLETE, W/O DOPPLER  8/08    Nl     COLONOSCOPY  04/2014     COLONOSCOPY N/A 8/26/2019    Procedure: COLONOSCOPY, WITH POLYPECTOMY;  Surgeon: José Baig MD;  Location:  OR      US ABDOMEN COMPLETE  6/2006    fatty liver     HERNIA REPAIR  1984     SURGICAL HISTORY OF -   1982    L Inguinal Hernia     SURGICAL HISTORY OF -   8/09    R Clavicle Fracture Repair       Review of Systems   Constitutional: Negative for chills and fever.   HENT: Negative for congestion, ear pain, hearing loss and sore throat.    Eyes: Negative for pain and visual disturbance.   Respiratory: Negative for cough and shortness of breath.    Cardiovascular: Negative for chest pain, palpitations and peripheral edema.   Gastrointestinal: Negative for abdominal pain, constipation, diarrhea, heartburn, hematochezia and nausea.   Genitourinary: Positive for frequency and impotence. Negative for discharge, dysuria, genital sores, hematuria and urgency.   Musculoskeletal: Negative for arthralgias, joint swelling and myalgias.   Skin: Negative for rash.   Neurological: Negative for dizziness, weakness, headaches and paresthesias.  "  Psychiatric/Behavioral: Negative for mood changes. The patient is not nervous/anxious.      CONSTITUTIONAL: NEGATIVE for fever, chills, change in weight  INTEGUMENTARY/SKIN: NEGATIVE for worrisome rashes, moles or lesions  EYES: NEGATIVE for vision changes or irritation  ENT: NEGATIVE for ear, mouth and throat problems  RESP: NEGATIVE for significant cough or SOB  CV: NEGATIVE for chest pain, palpitations or peripheral edema  GI: NEGATIVE for nausea, abdominal pain, heartburn, or change in bowel habits   male: negative for dysuria, hematuria, decreased urinary stream, erectile dysfunction, urethral discharge  MUSCULOSKELETAL: NEGATIVE for significant arthralgias or myalgia  NEURO: NEGATIVE for weakness, dizziness or paresthesias  PSYCHIATRIC: NEGATIVE for changes in mood or affect    OBJECTIVE:   /76   Pulse 64   Temp 98.5  F (36.9  C) (Tympanic)   Resp 16   Ht 1.753 m (5' 9\")   Wt 73.1 kg (161 lb 3.2 oz)   SpO2 99%   BMI 23.81 kg/m      Physical Exam  GENERAL: healthy, alert and no distress  EYES: Eyes grossly normal to inspection, PERRL and conjunctivae and sclerae normal  HENT: ear canals and TM's normal, nose and mouth without ulcers or lesions  NECK: no adenopathy, no asymmetry, masses, or scars and thyroid normal to palpation  RESP: lungs clear to auscultation - no rales, rhonchi or wheezes  CV: regular rate and rhythm, normal S1 S2, no S3 or S4, no murmur, click or rub, no peripheral edema and peripheral pulses strong  ABDOMEN: soft, nontender, no hepatosplenomegaly, no masses and bowel sounds normal  RECTAL: normal sphincter tone, no rectal masses, prostate normal size, smooth, nontender without nodules or masses  MS: no gross musculoskeletal defects noted, no edema  SKIN: no suspicious lesions or rashes  NEURO: Normal strength and tone, mentation intact and speech normal  PSYCH: mentation appears normal, affect normal/bright        ASSESSMENT/PLAN:   1. Routine general medical examination " "at a health care facility  Reviewed preventive care items.  Check health maintenance labs today.   - Testosterone Free and Total; Future    2. Hypertension goal BP (blood pressure) < 140/90  -controlled.   - lisinopril (ZESTRIL) 10 MG tablet; Take 1 tablet (10 mg) by mouth daily  Dispense: 90 tablet; Refill: 3    3. Maxillary bone loss  dexa scan with worst T score - 1.8.  Using FRAX calculator risk of major fracture 7% and hip 1.3% both below WHO criteria for medication treatment.  Reviewed calcium/vitamin D intake guidelines.     4. Low libido  Re-check.   - Testosterone Free and Total; Future    5. Erectile dysfunction, unspecified erectile dysfunction type  Can increase to 2-4 pills/time.   - sildenafil (REVATIO) 20 MG tablet; 2-4 tablets prior to sexual activity daily as needed.  Dispense: 20 tablet; Refill: 11    6. Hyperlipidemia LDL goal <130    - pravastatin (PRAVACHOL) 20 MG tablet; Take 1 tablet (20 mg) by mouth daily  Dispense: 90 tablet; Refill: 3    Patient has been advised of split billing requirements and indicates understanding: Yes  COUNSELING:   Reviewed preventive health counseling, as reflected in patient instructions       Regular exercise       Healthy diet/nutrition       Colon cancer screening       Prostate cancer screening       Osteoporosis prevention/bone health    Estimated body mass index is 23.81 kg/m  as calculated from the following:    Height as of this encounter: 1.753 m (5' 9\").    Weight as of this encounter: 73.1 kg (161 lb 3.2 oz).         He reports that he quit smoking about 10 years ago. His smoking use included cigarettes. He has a 2.50 pack-year smoking history. He has never used smokeless tobacco.      Counseling Resources:  ATP IV Guidelines  Pooled Cohorts Equation Calculator  FRAX Risk Assessment  ICSI Preventive Guidelines  Dietary Guidelines for Americans, 2010  USDA's MyPlate  ASA Prophylaxis  Lung CA Screening    Joel Daniel Wegener, MD  [facility] " [place]

## 2021-07-12 ENCOUNTER — OFFICE VISIT (OUTPATIENT)
Dept: FAMILY MEDICINE | Facility: CLINIC | Age: 56
End: 2021-07-12
Payer: COMMERCIAL

## 2021-07-12 VITALS
TEMPERATURE: 98.5 F | SYSTOLIC BLOOD PRESSURE: 122 MMHG | RESPIRATION RATE: 16 BRPM | DIASTOLIC BLOOD PRESSURE: 76 MMHG | HEIGHT: 69 IN | OXYGEN SATURATION: 99 % | WEIGHT: 161.2 LBS | HEART RATE: 64 BPM | BODY MASS INDEX: 23.88 KG/M2

## 2021-07-12 DIAGNOSIS — N52.9 ERECTILE DYSFUNCTION, UNSPECIFIED ERECTILE DYSFUNCTION TYPE: ICD-10-CM

## 2021-07-12 DIAGNOSIS — R68.82 LOW LIBIDO: ICD-10-CM

## 2021-07-12 DIAGNOSIS — I10 HYPERTENSION GOAL BP (BLOOD PRESSURE) < 140/90: ICD-10-CM

## 2021-07-12 DIAGNOSIS — E78.5 HYPERLIPIDEMIA LDL GOAL <130: ICD-10-CM

## 2021-07-12 DIAGNOSIS — M27.8 MAXILLARY BONE LOSS: ICD-10-CM

## 2021-07-12 DIAGNOSIS — Z00.00 ROUTINE GENERAL MEDICAL EXAMINATION AT A HEALTH CARE FACILITY: Primary | ICD-10-CM

## 2021-07-12 PROCEDURE — 99396 PREV VISIT EST AGE 40-64: CPT | Performed by: FAMILY MEDICINE

## 2021-07-12 PROCEDURE — 84403 ASSAY OF TOTAL TESTOSTERONE: CPT | Performed by: FAMILY MEDICINE

## 2021-07-12 PROCEDURE — 36415 COLL VENOUS BLD VENIPUNCTURE: CPT | Performed by: FAMILY MEDICINE

## 2021-07-12 PROCEDURE — 84270 ASSAY OF SEX HORMONE GLOBUL: CPT | Performed by: FAMILY MEDICINE

## 2021-07-12 PROCEDURE — 99213 OFFICE O/P EST LOW 20 MIN: CPT | Mod: 25 | Performed by: FAMILY MEDICINE

## 2021-07-12 RX ORDER — SILDENAFIL CITRATE 20 MG/1
TABLET ORAL
Qty: 20 TABLET | Refills: 11 | Status: SHIPPED | OUTPATIENT
Start: 2021-07-12 | End: 2021-07-12

## 2021-07-12 RX ORDER — LISINOPRIL 10 MG/1
10 TABLET ORAL DAILY
Qty: 90 TABLET | Refills: 3 | Status: SHIPPED | OUTPATIENT
Start: 2021-07-12 | End: 2022-06-27

## 2021-07-12 RX ORDER — PRAVASTATIN SODIUM 20 MG
20 TABLET ORAL DAILY
Qty: 90 TABLET | Refills: 3 | Status: SHIPPED | OUTPATIENT
Start: 2021-07-12 | End: 2022-07-11

## 2021-07-12 RX ORDER — SILDENAFIL CITRATE 20 MG/1
TABLET ORAL
Qty: 20 TABLET | Refills: 11 | Status: SHIPPED | OUTPATIENT
Start: 2021-07-12 | End: 2022-07-11

## 2021-07-12 ASSESSMENT — MIFFLIN-ST. JEOR: SCORE: 1546.58

## 2021-07-16 LAB
SHBG SERPL-SCNC: 30 NMOL/L (ref 11–80)
TESTOST FREE SERPL-MCNC: 6.03 NG/DL
TESTOST SERPL-MCNC: 291 NG/DL (ref 240–950)

## 2021-07-20 ENCOUNTER — MYC MEDICAL ADVICE (OUTPATIENT)
Dept: FAMILY MEDICINE | Facility: CLINIC | Age: 56
End: 2021-07-20

## 2021-07-20 DIAGNOSIS — I10 HYPERTENSION GOAL BP (BLOOD PRESSURE) < 140/90: Primary | ICD-10-CM

## 2021-07-20 DIAGNOSIS — E78.5 HYPERLIPIDEMIA LDL GOAL <130: ICD-10-CM

## 2021-07-20 DIAGNOSIS — Z11.4 SCREENING FOR HIV (HUMAN IMMUNODEFICIENCY VIRUS): ICD-10-CM

## 2021-08-02 ENCOUNTER — MYC MEDICAL ADVICE (OUTPATIENT)
Dept: FAMILY MEDICINE | Facility: CLINIC | Age: 56
End: 2021-08-02

## 2021-08-02 ENCOUNTER — LAB (OUTPATIENT)
Dept: LAB | Facility: CLINIC | Age: 56
End: 2021-08-02
Payer: COMMERCIAL

## 2021-08-02 DIAGNOSIS — I10 HYPERTENSION GOAL BP (BLOOD PRESSURE) < 140/90: ICD-10-CM

## 2021-08-02 DIAGNOSIS — E78.5 HYPERLIPIDEMIA LDL GOAL <130: ICD-10-CM

## 2021-08-02 DIAGNOSIS — Z11.4 SCREENING FOR HIV (HUMAN IMMUNODEFICIENCY VIRUS): ICD-10-CM

## 2021-08-02 LAB
ALBUMIN SERPL-MCNC: 3.8 G/DL (ref 3.4–5)
ALP SERPL-CCNC: 53 U/L (ref 40–150)
ALT SERPL W P-5'-P-CCNC: 29 U/L (ref 0–70)
ANION GAP SERPL CALCULATED.3IONS-SCNC: 3 MMOL/L (ref 3–14)
AST SERPL W P-5'-P-CCNC: 19 U/L (ref 0–45)
BILIRUB SERPL-MCNC: 0.3 MG/DL (ref 0.2–1.3)
BUN SERPL-MCNC: 13 MG/DL (ref 7–30)
CALCIUM SERPL-MCNC: 9.1 MG/DL (ref 8.5–10.1)
CHLORIDE BLD-SCNC: 107 MMOL/L (ref 94–109)
CHOLEST SERPL-MCNC: 203 MG/DL
CO2 SERPL-SCNC: 29 MMOL/L (ref 20–32)
CREAT SERPL-MCNC: 0.77 MG/DL (ref 0.66–1.25)
CREAT UR-MCNC: 86 MG/DL
FASTING STATUS PATIENT QL REPORTED: YES
GFR SERPL CREATININE-BSD FRML MDRD: >90 ML/MIN/1.73M2
GLUCOSE BLD-MCNC: 106 MG/DL (ref 70–99)
HDLC SERPL-MCNC: 38 MG/DL
HIV 1+2 AB+HIV1 P24 AG SERPL QL IA: NONREACTIVE
LDLC SERPL CALC-MCNC: 134 MG/DL
MICROALBUMIN UR-MCNC: 13 MG/L
MICROALBUMIN/CREAT UR: 15.12 MG/G CR (ref 0–17)
NONHDLC SERPL-MCNC: 165 MG/DL
POTASSIUM BLD-SCNC: 4.2 MMOL/L (ref 3.4–5.3)
PROT SERPL-MCNC: 6.6 G/DL (ref 6.8–8.8)
SODIUM SERPL-SCNC: 139 MMOL/L (ref 133–144)
TRIGL SERPL-MCNC: 153 MG/DL

## 2021-08-02 PROCEDURE — 82043 UR ALBUMIN QUANTITATIVE: CPT

## 2021-08-02 PROCEDURE — 36415 COLL VENOUS BLD VENIPUNCTURE: CPT

## 2021-08-02 PROCEDURE — 87389 HIV-1 AG W/HIV-1&-2 AB AG IA: CPT

## 2021-08-02 PROCEDURE — 80053 COMPREHEN METABOLIC PANEL: CPT

## 2021-08-02 PROCEDURE — 80061 LIPID PANEL: CPT

## 2021-08-19 ENCOUNTER — OFFICE VISIT (OUTPATIENT)
Dept: URGENT CARE | Facility: URGENT CARE | Age: 56
End: 2021-08-19
Payer: COMMERCIAL

## 2021-08-19 VITALS
HEART RATE: 67 BPM | SYSTOLIC BLOOD PRESSURE: 136 MMHG | TEMPERATURE: 98.2 F | BODY MASS INDEX: 23.78 KG/M2 | DIASTOLIC BLOOD PRESSURE: 85 MMHG | WEIGHT: 161 LBS | OXYGEN SATURATION: 99 %

## 2021-08-19 DIAGNOSIS — R30.0 DYSURIA: Primary | ICD-10-CM

## 2021-08-19 LAB
ALBUMIN UR-MCNC: NEGATIVE MG/DL
APPEARANCE UR: CLEAR
BACTERIA #/AREA URNS HPF: ABNORMAL /HPF
BILIRUB UR QL STRIP: NEGATIVE
COLOR UR AUTO: YELLOW
GLUCOSE UR STRIP-MCNC: NEGATIVE MG/DL
HGB UR QL STRIP: NEGATIVE
KETONES UR STRIP-MCNC: NEGATIVE MG/DL
LEUKOCYTE ESTERASE UR QL STRIP: ABNORMAL
NITRATE UR QL: NEGATIVE
PH UR STRIP: 7 [PH] (ref 5–7)
RBC #/AREA URNS AUTO: ABNORMAL /HPF
SP GR UR STRIP: 1.02 (ref 1–1.03)
SQUAMOUS #/AREA URNS AUTO: ABNORMAL /LPF
UROBILINOGEN UR STRIP-ACNC: 0.2 E.U./DL
WBC #/AREA URNS AUTO: ABNORMAL /HPF
WBC CLUMPS #/AREA URNS HPF: PRESENT /HPF

## 2021-08-19 PROCEDURE — 99213 OFFICE O/P EST LOW 20 MIN: CPT | Performed by: FAMILY MEDICINE

## 2021-08-19 PROCEDURE — 81001 URINALYSIS AUTO W/SCOPE: CPT

## 2021-08-19 PROCEDURE — 87086 URINE CULTURE/COLONY COUNT: CPT | Performed by: FAMILY MEDICINE

## 2021-08-19 RX ORDER — NITROFURANTOIN 25; 75 MG/1; MG/1
100 CAPSULE ORAL 2 TIMES DAILY
Qty: 10 CAPSULE | Refills: 0 | Status: SHIPPED | OUTPATIENT
Start: 2021-08-19 | End: 2021-08-24

## 2021-08-20 NOTE — PROGRESS NOTES
Assessment & Plan     Dysuria  - UA Macro with Reflex to Micro and Culture - lab collect  - UA Macro with Reflex to Micro and Culture - lab collect  - Urine Microscopic  - Urine Culture Aerobic Bacterial  - nitroFURantoin macrocrystal-monohydrate (MACROBID) 100 MG capsule  Dispense: 10 capsule; Refill: 0  - Urine Culture Aerobic Bacterial     No e/o pyelonephritis at this time. Start macrobid at this time  Urine culture ordered given his symptoms      See AVS summary for additional recommendations reviewed with patient during this visit.     Abhinav Lester MD   [facility]    Latrice Avina is a 55 year old male who presents to clinic today for the following health issues:  Chief Complaint   Patient presents with     Urgent Care     UTI     c/o dysuria for 1 week     HPI    No known history of prostate issues. No previous hx of UTI    No penile discharge    Has burning with urination, cloudy urine, and feeling of incomplete void - 1 week of symptoms    On average drinking about 36 ounces water a day    Denies fevers/chills/body aches/kidney pain     No hx of kidney stones      Patient Active Problem List    Diagnosis Date Noted     Psychophysiologic insomnia 12/16/2016     Priority: Medium     Overweight (BMI 25.0-29.9) 01/01/2013     Priority: Medium     Hypertension goal BP (blood pressure) < 140/90 01/01/2013     Priority: Medium     HYPERLIPIDEMIA LDL GOAL <130 10/31/2010     Priority: Medium     Fatty liver 06/10/2008     Priority: Medium       Current Outpatient Medications   Medication     lisinopril (ZESTRIL) 10 MG tablet     Multiple Vitamin (MULTI-VITAMIN) per tablet     nitroFURantoin macrocrystal-monohydrate (MACROBID) 100 MG capsule     pravastatin (PRAVACHOL) 20 MG tablet     sildenafil (REVATIO) 20 MG tablet     No current facility-administered medications for this visit.         Objective    /85   Pulse 67   Temp 98.2  F (36.8  C) (Oral)   Wt 73 kg (161 lb)   SpO2 99%    BMI 23.78 kg/m    Physical Exam         Results for orders placed or performed in visit on 08/19/21   UA Macro with Reflex to Micro and Culture - lab collect     Status: Abnormal    Specimen: Urine, Clean Catch   Result Value Ref Range    Color Urine Yellow Colorless, Straw, Light Yellow, Yellow    Appearance Urine Clear Clear    Glucose Urine Negative Negative mg/dL    Bilirubin Urine Negative Negative    Ketones Urine Negative Negative mg/dL    Specific Gravity Urine 1.020 1.003 - 1.035    Blood Urine Negative Negative    pH Urine 7.0 5.0 - 7.0    Protein Albumin Urine Negative Negative mg/dL    Urobilinogen Urine 0.2 0.2, 1.0 E.U./dL    Nitrite Urine Negative Negative    Leukocyte Esterase Urine Trace (A) Negative   Urine Microscopic     Status: Abnormal   Result Value Ref Range    Bacteria Urine Moderate (A) None Seen /HPF    RBC Urine 0-2 0-2 /HPF /HPF    WBC Urine 5-10 (A) 0-5 /HPF /HPF    Squamous Epithelials Urine Few (A) None Seen /LPF    WBC Clumps Urine Present (A) None Seen /HPF    Narrative    Urine Culture not indicated   Urine Culture Aerobic Bacterial     Status: None    Specimen: Urine, Midstream   Result Value Ref Range    Culture No Growth                      The use of [employer] dictation services may have been used to construct the content in this note; any grammatical or spelling errors are non-intentional. Please contact the author of this note directly if you are in need of any clarification.

## 2021-08-20 NOTE — PATIENT INSTRUCTIONS
Take medication macrobid /nitrofurantoin twice a day for 5 days    Symptoms should improve within the next 2-3 days. If no improvement, call clinic to discuss or return for re-evaluation    Drink approximately 60-70 ounces of water a day to stay hydrated. Will need more if working out or spending time in the heat    If you develop flank pain, fevers, nausea/vomiting call immediately for assistance

## 2021-08-21 LAB — BACTERIA UR CULT: NO GROWTH

## 2021-08-23 DIAGNOSIS — R30.0 DYSURIA: ICD-10-CM

## 2021-08-24 RX ORDER — NITROFURANTOIN 25; 75 MG/1; MG/1
100 CAPSULE ORAL 2 TIMES DAILY
Qty: 10 CAPSULE | Refills: 0 | Status: SHIPPED | OUTPATIENT
Start: 2021-08-24 | End: 2022-04-09

## 2021-08-24 NOTE — TELEPHONE ENCOUNTER
Routing refill request to provider for review/approval because:  Drug not on the [facility] refill protocol       Last Written Prescription Date:  8/19/21  Last Fill Quantity: 10,  # refills: 0   Last office visit: 7/12/2021 with prescribing provider:  REGI 8/19/21   Future Office Visit:  Susan Kaplan RN  [facility]

## 2021-09-22 ENCOUNTER — TRANSFERRED RECORDS (OUTPATIENT)
Dept: HEALTH INFORMATION MANAGEMENT | Facility: CLINIC | Age: 56
End: 2021-09-22

## 2021-10-01 ENCOUNTER — TRANSFERRED RECORDS (OUTPATIENT)
Dept: HEALTH INFORMATION MANAGEMENT | Facility: CLINIC | Age: 56
End: 2021-10-01

## 2021-10-24 ENCOUNTER — HEALTH MAINTENANCE LETTER (OUTPATIENT)
Age: 56
End: 2021-10-24

## 2022-03-14 ENCOUNTER — TRANSFERRED RECORDS (OUTPATIENT)
Dept: HEALTH INFORMATION MANAGEMENT | Facility: CLINIC | Age: 57
End: 2022-03-14
Payer: COMMERCIAL

## 2022-03-21 ENCOUNTER — TRANSFERRED RECORDS (OUTPATIENT)
Dept: HEALTH INFORMATION MANAGEMENT | Facility: CLINIC | Age: 57
End: 2022-03-21
Payer: COMMERCIAL

## 2022-03-31 ENCOUNTER — E-VISIT (OUTPATIENT)
Dept: FAMILY MEDICINE | Facility: CLINIC | Age: 57
End: 2022-03-31
Payer: COMMERCIAL

## 2022-03-31 DIAGNOSIS — M80.00XA OSTEOPOROSIS WITH CURRENT PATHOLOGICAL FRACTURE, UNSPECIFIED OSTEOPOROSIS TYPE, INITIAL ENCOUNTER: Primary | ICD-10-CM

## 2022-03-31 PROCEDURE — 99422 OL DIG E/M SVC 11-20 MIN: CPT | Performed by: FAMILY MEDICINE

## 2022-04-09 ENCOUNTER — E-VISIT (OUTPATIENT)
Dept: URGENT CARE | Facility: CLINIC | Age: 57
End: 2022-04-09
Payer: COMMERCIAL

## 2022-04-09 ENCOUNTER — OFFICE VISIT (OUTPATIENT)
Dept: URGENT CARE | Facility: URGENT CARE | Age: 57
End: 2022-04-09
Payer: COMMERCIAL

## 2022-04-09 VITALS
TEMPERATURE: 97.8 F | BODY MASS INDEX: 23.78 KG/M2 | OXYGEN SATURATION: 97 % | DIASTOLIC BLOOD PRESSURE: 70 MMHG | HEART RATE: 69 BPM | WEIGHT: 161 LBS | SYSTOLIC BLOOD PRESSURE: 118 MMHG

## 2022-04-09 DIAGNOSIS — R30.0 DYSURIA: ICD-10-CM

## 2022-04-09 DIAGNOSIS — R31.9 URINARY TRACT INFECTION WITH HEMATURIA, SITE UNSPECIFIED: Primary | ICD-10-CM

## 2022-04-09 DIAGNOSIS — N39.0 URINARY TRACT INFECTION WITH HEMATURIA, SITE UNSPECIFIED: Primary | ICD-10-CM

## 2022-04-09 DIAGNOSIS — N30.00 ACUTE CYSTITIS WITHOUT HEMATURIA: Primary | ICD-10-CM

## 2022-04-09 DIAGNOSIS — R30.0 DIFFICULT OR PAINFUL URINATION: Primary | ICD-10-CM

## 2022-04-09 LAB
ALBUMIN UR-MCNC: 30 MG/DL
APPEARANCE UR: CLEAR
BACTERIA #/AREA URNS HPF: ABNORMAL /HPF
BILIRUB UR QL STRIP: NEGATIVE
COLOR UR AUTO: YELLOW
GLUCOSE UR STRIP-MCNC: NEGATIVE MG/DL
HGB UR QL STRIP: ABNORMAL
KETONES UR STRIP-MCNC: NEGATIVE MG/DL
LEUKOCYTE ESTERASE UR QL STRIP: ABNORMAL
NITRATE UR QL: NEGATIVE
PH UR STRIP: 7.5 [PH] (ref 5–7)
RBC #/AREA URNS AUTO: ABNORMAL /HPF
SP GR UR STRIP: 1.01 (ref 1–1.03)
SQUAMOUS #/AREA URNS AUTO: ABNORMAL /LPF
UROBILINOGEN UR STRIP-ACNC: 0.2 E.U./DL
WBC #/AREA URNS AUTO: ABNORMAL /HPF
WBC CLUMPS #/AREA URNS HPF: PRESENT /HPF

## 2022-04-09 PROCEDURE — 87088 URINE BACTERIA CULTURE: CPT | Performed by: STUDENT IN AN ORGANIZED HEALTH CARE EDUCATION/TRAINING PROGRAM

## 2022-04-09 PROCEDURE — 99421 OL DIG E/M SVC 5-10 MIN: CPT | Performed by: PHYSICIAN ASSISTANT

## 2022-04-09 PROCEDURE — 99213 OFFICE O/P EST LOW 20 MIN: CPT | Performed by: STUDENT IN AN ORGANIZED HEALTH CARE EDUCATION/TRAINING PROGRAM

## 2022-04-09 PROCEDURE — 87086 URINE CULTURE/COLONY COUNT: CPT | Performed by: STUDENT IN AN ORGANIZED HEALTH CARE EDUCATION/TRAINING PROGRAM

## 2022-04-09 PROCEDURE — 81001 URINALYSIS AUTO W/SCOPE: CPT | Performed by: STUDENT IN AN ORGANIZED HEALTH CARE EDUCATION/TRAINING PROGRAM

## 2022-04-09 RX ORDER — NITROFURANTOIN 25; 75 MG/1; MG/1
100 CAPSULE ORAL 2 TIMES DAILY
Qty: 14 CAPSULE | Refills: 0 | Status: SHIPPED | OUTPATIENT
Start: 2022-04-09 | End: 2022-04-16

## 2022-04-09 RX ORDER — CALCITONIN SALMON 200 [IU]/.09ML
SPRAY, METERED NASAL
COMMUNITY
Start: 2022-03-14 | End: 2022-06-29

## 2022-04-09 NOTE — PATIENT INSTRUCTIONS
Come back if you develop any fevers/chills, nausea/vomiting and unable to take medications, worsening back pain -these are signs of resistant or worsening infection. You should call your doctor or present to urgent care/the Emergency Department.

## 2022-04-09 NOTE — PATIENT INSTRUCTIONS
Dear Fabrice Donahue,     After reviewing your responses, I would like you to come in for a urine test to make sure we treat you correctly. This urine test is to evaluate you for a possible urinary tract infection, and should be scheduled for today or tomorrow. Schedule a Lab Only appointment here.     Lab appointments are not available at most locations on the weekends. If no Lab Only appointment is available, you should be seen in any of our convenient Walk-in or Urgent Care Centers, which can be found on our website here.     You will receive instructions with your results in [employer] once they are available.     If your symptoms worsen, you develop pain in your back or stomach, develop fevers, or are not improving in 5 days, please contact your primary care provider for an appointment or visit a Walk-in or Urgent Care Center to be seen.     Thanks again for choosing us as your health care partner,     Yary Larkin PA-C

## 2022-04-09 NOTE — PROGRESS NOTES
URGENT CARE - [facility]    Assessment & Plan    Fabrice Donahue is a 56 year old without a significant past medical history presenting with the following issues:    Problem List Items Addressed This Visit     None      Visit Diagnoses     Acute cystitis without hematuria    -  Primary    Relevant Medications    nitroFURantoin macrocrystal-monohydrate (MACROBID) 100 MG capsule    Dysuria        Relevant Orders    UA macro with reflex to Microscopic and Culture - Clinc Collect (Completed)    Urine Microscopic Exam (Completed)    Urine Culture        Declined STI testing. Will call if results of culture show that macrobid will be ineffective.    No follow-ups on file.   Return precautions discussed.    Nikia Scott MD      Subjective     Since yesterday, burning with urination, frequent urination. Had one episode before in 2019. No fevers/chills, nausea/vomiting, abd/pack pain. No urinary discharge. No concerns for STI.    No medication allergies.      Objective     /70   Pulse 69   Temp 97.8  F (36.6  C) (Tympanic)   Wt 73 kg (161 lb)   SpO2 97%   BMI 23.78 kg/m     Physical Exam  Constitutional:       General: He is not in acute distress.     Appearance: Normal appearance. He is not ill-appearing or toxic-appearing.   Eyes:      Conjunctiva/sclera: Conjunctivae normal.   Cardiovascular:      Rate and Rhythm: Normal rate.      Heart sounds: Normal heart sounds.   Pulmonary:      Effort: Pulmonary effort is normal.      Breath sounds: Normal breath sounds.   Abdominal:      General: Abdomen is flat. There is no distension.      Palpations: Abdomen is soft.      Tenderness: There is no abdominal tenderness. There is no right CVA tenderness, left CVA tenderness or guarding.   Skin:     General: Skin is warm and dry.   Neurological:      General: No focal deficit present.      Mental Status: He is alert.   Psychiatric:         Mood and Affect: Mood normal.         Behavior: Behavior normal.          Labs and  imaging notable for: pyuria, wbc clumps, +LE.

## 2022-04-11 ENCOUNTER — TRANSFERRED RECORDS (OUTPATIENT)
Dept: HEALTH INFORMATION MANAGEMENT | Facility: CLINIC | Age: 57
End: 2022-04-11
Payer: COMMERCIAL

## 2022-04-12 LAB — BACTERIA UR CULT: ABNORMAL

## 2022-04-25 ENCOUNTER — TRANSFERRED RECORDS (OUTPATIENT)
Dept: HEALTH INFORMATION MANAGEMENT | Facility: CLINIC | Age: 57
End: 2022-04-25
Payer: COMMERCIAL

## 2022-06-29 ENCOUNTER — VIRTUAL VISIT (OUTPATIENT)
Dept: ENDOCRINOLOGY | Facility: CLINIC | Age: 57
End: 2022-06-29
Payer: COMMERCIAL

## 2022-06-29 ENCOUNTER — MYC MEDICAL ADVICE (OUTPATIENT)
Dept: ENDOCRINOLOGY | Facility: CLINIC | Age: 57
End: 2022-06-29

## 2022-06-29 DIAGNOSIS — E29.1 HYPOGONADISM MALE: ICD-10-CM

## 2022-06-29 DIAGNOSIS — R68.82 LOW LIBIDO: ICD-10-CM

## 2022-06-29 DIAGNOSIS — F51.04 PSYCHOPHYSIOLOGIC INSOMNIA: ICD-10-CM

## 2022-06-29 DIAGNOSIS — E29.1 MALE HYPOGONADISM: Primary | ICD-10-CM

## 2022-06-29 DIAGNOSIS — I10 HYPERTENSION GOAL BP (BLOOD PRESSURE) < 140/90: ICD-10-CM

## 2022-06-29 DIAGNOSIS — E78.5 HYPERLIPIDEMIA LDL GOAL <130: ICD-10-CM

## 2022-06-29 DIAGNOSIS — M85.80 OSTEOPENIA, UNSPECIFIED LOCATION: ICD-10-CM

## 2022-06-29 DIAGNOSIS — M80.00XA OSTEOPOROSIS WITH CURRENT PATHOLOGICAL FRACTURE, UNSPECIFIED OSTEOPOROSIS TYPE, INITIAL ENCOUNTER: ICD-10-CM

## 2022-06-29 DIAGNOSIS — N52.8 OTHER MALE ERECTILE DYSFUNCTION: Primary | ICD-10-CM

## 2022-06-29 PROCEDURE — 99215 OFFICE O/P EST HI 40 MIN: CPT | Mod: 95 | Performed by: INTERNAL MEDICINE

## 2022-06-29 RX ORDER — TESTOSTERONE CYPIONATE 200 MG/ML
50 INJECTION, SOLUTION INTRAMUSCULAR WEEKLY
Qty: 10 ML | Refills: 0 | Status: SHIPPED | OUTPATIENT
Start: 2022-06-29 | End: 2022-09-06

## 2022-06-29 RX ORDER — CALCITONIN SALMON 200 [IU]/.09ML
1 SPRAY, METERED NASAL DAILY
Qty: 3.7 ML | Refills: 11 | Status: SHIPPED | OUTPATIENT
Start: 2022-06-29 | End: 2024-09-03

## 2022-06-29 NOTE — NURSING NOTE
Patient denies any changes since echeck-in regarding medication and allergies and states all information entered during echeck-in remains accurate.  Pau De Jesus on 6/29/2022 at 2:15 PM

## 2022-06-29 NOTE — PATIENT INSTRUCTIONS
Nice to talk with you today in virtual clinic!    for now, please continue vit D 5,000 international unit(s) daily + calcitonin salmon nasal spray.    AFTER YOUR LABS BELOW ARE DONE, OK TO START TESTOSTERONE 50 MG IM WEEKLY    EARLY IN THE MORNING, Please go to any [facility] lab.  Follow standard safety precautions for COVID-19. Practice social isolation, hand hygiene, do not touch your face, nose, or mouth, wear mask if have to go out in public to be respectful of community.  Please contact us to schedule at any of our [facility] lab locations. Call [phone] ([phone]), select option [phone].    - Do weight bearing exercises  - Repeat DXA in 2 years after last one or about 7/2023  _________________________________________________________________________    CALCIUM Recommendation 1500 mg/day in divided dose of no more than 500 mg/dose. This includes what is in your food and also what is in any supplements you are taking.      Dietary sources of calcium: These also contain vitamin D  Milk / buttermilk              8 oz            300 mg  Dry milk powder       5 Tbsp             300 mg  Yogurt                          1 cup           400 mg  Ice cream   1/2 cup          100 mg  Hard cheese                     1.5 oz          300 mg  Cottage cheese                1/2 cup        65 mg  Spinach, cooked  1 cup  240 mg  Tofu, soft regular           4 oz          120 to 390 mg  Sardines                       3 oz               370 mg  Mackerel canned         3 oz                250 mg  Canned salmon with bones 3 oz        170-210 mg  Calcium fortified cereals are available  SILK soy and almond milk products are calcium fortified  Orange juice  Fortified with Calcium       8 oz            300 mg  Low fat dairy sources are recommended    Recommended exercise goals:    30 minutes of moderate exercise on most days of the week .  Weight bearing exercise (ie, walking, jogging, hiking, dancing)    Strength  training 2 or more times/week in addition to other weight -being exercise.  Strength training -- uses weight or resistance beyond that seen in everyday activities -(pilates, weight training with free weights, weight machines or resistance bands)    OSTEOPOROSIS of the spine: avoid exercise machines that incorporate spinal flexion, trunk rotation, or forward bending   Specifically avoid abdominal exercisers, stationary bikes with moving handles, cross-country ski machines, rowing machines, and bicep curl machines  Spinal fractures: AVOID activities that place significant force on the spine such as horse back riding,  tennis, golf or bowling    RTC: annual as needed based on results    Please use [facility] to schedule your appointment(s) or call [phone] to schedule in Endocrinology and Diabetes Clinic      Best Wishes,  Dr. Ling Jimenez MD, MPH  Endocrinologist

## 2022-06-29 NOTE — LETTER
6/29/2022       RE: Fabrice Donahue  [address]  [facility] [address]     Dear Colleague,    Thank you for referring your patient, Fabrice Donahue, to the [facility] [place] [facility]. Please see a copy of my visit note below.    Fabrice Donahue  is being evaluated via a billable video visit.      How would you like to obtain your AVS? MyChart  For the video visit, send the invitation by: Text to cell phone: [phone]  Will anyone else be joining your video visit? No         Diabetes & Endocrinology Clinic Follow-up   Consult     Reason for visit/consult: ED + Osteopenia + lowish testosterone    Primary care provider:     Wegener, Joel Daniel Irwin, MD     PCP - General, Family Practice     Since 1/23/2015     [phone]     HPI:  55 yo male who I last saw 8/5/2020 who is seen in follow-up at initial consult request of Dr. Wegener for evaluation & management of ED, low libido, and hypogonadism with low-rosana, yet normal range testosterone of 303 on 7/23/20 collected at 9:58am and 244 a month prior and was 349 about 4 yrs prior. He has had noticed a slow decline in function. He has been taking sildenafil (Revatio) 20 mg recently and this has not been helping. He does not have spontaneous morning erections x past 3-4 months. He has 4 kids and did not have any issues with fertility. He has not been on testosterone replacement before. Libido is almost non-existent, started to notice a gradual decline over past 5-10 years. Last intercourse was several months ago and it was not to his satisfaction. He has some anxiety.  No chest pain or shortness of breath or CVD.    Plan when I saw him last on 8/5/2020 was:  ED, lack of spontaneous erections is concerning, with testosterone in low end of normal range. He could benefit from early morning (testosterone peaks in early am) labs to evaluate gonadotroph function, prolactin, thyroid axis. Refer  to urology for lack of spontaneous erections and concern for vascular etiology.   Labs: see orders   RTC: TBD based on results    Since then, he has seen urology + labs below    Per Urology consult from 9/15/2020:  Assessment: 55 year old male with ED and low libido for the past few years. Testerone noted to be on the lower end of normal range. Has tried sildenafil 40 mg, but has not self-stimulated after taking. We discussed that this is an important step to evaluate effectiveness and he will plan to try it again. We discussed his risk factors for ED, including HTN and HLD. Also discussed possible causes of low libido, including psychological (depression, anxiety, stress), low testosterone (his being on the lower end of normal), medications (is not taking SSRIs, etc.) chronic opioid use (not applicable) and inter partner issues (he denies). He does acknowledge that he may have some underlying depression and anxiety, although this is difficult to determine, given the lifestyle impact of the COVID-19 crisis. Discussed the potential utility of a referral to the [facility], and he is interested.      We discussed next steps for ED management if the sildenafil is ineffective, including ICI. He is hesitant to consider this, but will plan to think about it.      Plan:  -Trial sildenafil again. Refill sent.  -Follow up with the [facility].  -Follow up with Dr. Ayoub for further evaluation, if needed, or if interested in ICI.         Mikki Borja, CNP  Department of Urology         He has improved his sleeping patterns. He fell down some stairs and had compression fractures. Dentist said he may have bone loss in his jaw. He was thinking he should start testosterone. He is doing cycling.     Latest Reference Range & Units 08/14/20 07:30   Free Testosterone Calculated 4.7 - 24.4 ng/dL 6.86   FSH 0.7 - 10.8 IU/L 5.2   PSA 0 - 4 ug/L 0.51   Hemoglobin 13.3 - 17.7 g/dL 14.9   Testosterone Total  (11 months ago to 9 years ago, most recent 291):   240 - 950 ng/dL 291  344 CM  303 CM  244 CM  349 CM  252 CM  257       Past Medical/Surgical History:  Past Medical History:   Diagnosis Date     Benign neoplasm of skin of other and unspecified parts of face     atypical nevus     Essential hypertension, benign      Lyme disease 8/2007    hot welts, bull eye bruise     Mixed hyperlipidemia      Other chronic nonalcoholic liver disease 2006    Fatty liver     Shoulder pain, right     post clavicular fx     Syncope 7/08    neg Head/Neck CT     Unspecified hemorrhoids without mention of complication      Past Surgical History:   Procedure Laterality Date     COLONOSCOPY  04/2014     COLONOSCOPY N/A 8/26/2019    Procedure: COLONOSCOPY, WITH POLYPECTOMY;  Surgeon: José Baig MD;  Location: [facility] OR     HC US ABDOMEN COMPLETE  6/2006    fatty liver     HERNIA REPAIR  1984     SURGICAL HISTORY OF -   1982    L Inguinal Hernia     SURGICAL HISTORY OF -   8/09    R Clavicle Fracture Repair     ZZC ECHO HEART XTHORACIC,COMPLETE, W/O DOPPLER  8/08    Nl       Allergies:  Allergies   Allergen Reactions     Ivp Dye [Contrast Dye]        Current Medications MEDICATIONS IN THIS CHART MAY NOT BE ACCURATE.    Current Outpatient Medications   Medication     calcitonin, salmon, (MIACALCIN) 200 UNIT/ACT nasal spray     lisinopril (ZESTRIL) 10 MG tablet     Multiple Vitamin (MULTI-VITAMIN) per tablet     pravastatin (PRAVACHOL) 20 MG tablet     sildenafil (REVATIO) 20 MG tablet     No current facility-administered medications for this visit.       Family History:  Family History   Problem Relation Age of Onset     Hypertension Mother      Cancer Mother         kidney     Lipids Mother         high     Thyroid Disease Mother         hyper     Hyperlipidemia Mother      Respiratory Father         emphysema     Hypertension Maternal Grandmother      Cerebrovascular Disease Maternal Grandmother      Lipids Sister         high      Hypertension Sister      Lipids Brother         high     Hypertension Brother      Diabetes Maternal Aunt         type II     Hypertension Sister        Social History: . 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 Rare alcohol use. No drug use.  Social History     Tobacco Use     Smoking status: Former Smoker     Packs/day: 0.50     Years: 5.00     Pack years: 2.50     Types: Cigarettes     Quit date: 10/1/2010     Years since quittin.7     Smokeless tobacco: Never Used     Tobacco comment: Already quit smoking   Substance Use Topics     Alcohol use: Yes     Comment: Less than 1 drink a week; occasional beer       ROS:  see HPI    Exam  No vitals, video visit  Vital Signs  2021   BMI (Calculated)  23.8     GEN: Healthy, alert and no distress  HEENT: EOMI  RESP: No audible wheeze, cough, or visible cyanosis  SKIN: Visible skin clear  NEURO: Cranial nerves grossly intact. Mentation and speech appropriate for age  PSYCH: Mentation appears normal, affect normal/bright, judgement and insight intact, normal speech and appearance well-groomed    Labs/Imaging    See HPI  ENDO PITUITARY LABS[facility] Latest Ref Rng & Units 2020   ACTH <47 pg/mL 27   CORTISOL, SERUM 4 - 22 ug/dL 12.0   FSH 0.7 - 10.8 IU/L 5.2   GLUCOSE 70 - 99 mg/dL    LH 1.5 - 9.3 IU/L 4.0   POTASSIUM 3.4 - 5.3 mmol/L    PROLACTIN 2 - 18 ug/L 12   PSA 0 - 4 ug/L 0.51   SEX HORMONE BINDING GLOBULIN 11 - 80 nmol/L 33   SODIUM 133 - 144 mmol/L    TESTOSTERONE FREE 8 - 30 ng/dL    TESTOSTERONE TOTAL 240 - 950 ng/dL 344   TSH 0.40 - 4.00 mU/L 3.03   T4 FREE 0.76 - 1.46 ng/dL 1.07     BONE DENSITOMETRY  [facility]  [address] [place] [id number]  2021      PATIENT: Fabrice Donahue  CHART: [id number]   :  1965  AGE:  55 year old  SEX:  male   REFERRING PROVIDER:  Wegener, Joel Daniel Irwin, MD     PROCEDURE:  Bone density scanning was performed using DXA technology  of the lumbar spine and hip.  Scanning was performed on a Lunar Prodigy scanner.  Reporting is completed in the form of a T-score.  The T-score represents the standard deviation from peak bone mass based on a young healthy adult.     REFERENCE T-SCORES:       Normal                -1.0 and greater                                 Osteopenia         Between -1.0 and -2.5                                           Osteoporosis     -2.5 and less                                       RISK FACTORS:  Fracture of collar bone, reported maxillary bone loss     CURRENT TREATMENT:  None listed     FINDINGS:               Lumbar Spine (L1-L4)      T-score:  -0.9               Left Femoral Neck            T-score:  -1.5               Right Femoral Neck          T-score:  -1.8                            Lumbar (L1-L4) BMD: 1.120  Previous: n/a                  Total Hip Mean BMD: 1.001  Previous: n/a        IMPRESSION  Osteopenia (low bone mass)  Degenerative changes of the spine  Recommendations include ensuring adequate daily Calcium and Vitamin D intake  Follow up scan can be considered in five years.     Current NOF guidelines recommend treatment for patients with the following:  - Prior hip or vertebral fracture  - T-score -2.5 or below  - A 10 year risk of any major osteoporotic fracture >20% or 10 year risk of hip fracture >3%, as calculated using the FRAX calculator (www.shef.ac.uk/FRAX).       This patient's risks with the use of FRAX (based on available information) are 5.9 % for major osteoporotic fracture and 0.8 % for hip fracture.   Based on these guidelines, treatment (in addition to calcium and vitamin D) is not recommended for this patient.  While this is meant as an aid to clinical decision-making, clinical judgment must still be used.         LORENZO FELTON M.D.     Assessment and Plan    1) Hypogonadotrophic Hypogonadism for unclear reasons likely 2/2 psychophysiologic insomnia with lowish testosterone and ED &  Osteopenia. While technically his testosterone is in the normal range, he could benefit from increasing it to middle of the normal range for his Osteopenia w/ ho compression fractures and ED both. Benefits>risks here. Pituitary evaluation above was otherwise normal. Will repeat & update in rare chance there is another etiology.  Will update labs of PSA, free & total testosterone, hemoglobin, pituitary evaluation again PRIOR to starting testosterone 50 mg IM weekly.    2) Osteopenia w/ ho provoked compression fractures. He is taking vit D 5,000 international unit(s) daily + calcitonin nasal (ran out of the latter). He is instructed to do weight bearing activity + take calcium in his diet + restart calcitonin nasal. Vit D lab is pending.  Next DXA due anytime after 7/2023    Labs, see orders    Patient Instructions:    Nice to talk with you today in virtual clinic!    for now, please continue vit D 5,000 international unit(s) daily + calcitonin salmon nasal spray.    AFTER YOUR LABS BELOW ARE DONE, OK TO START TESTOSTERONE 50 MG IM WEEKLY    EARLY IN THE MORNING, Please go to any [facility] lab.  Follow standard safety precautions for COVID-19. Practice social isolation, hand hygiene, do not touch your face, nose, or mouth, wear mask if have to go out in public to be respectful of community.  Please contact us to schedule at any of our [facility] lab locations. Call [phone] ([phone]), select option 1.    - Do weight bearing exercises  - Repeat DXA in 2 years after last one or about 7/2023  _________________________________________________________________________    CALCIUM Recommendation 1500 mg/day in divided dose of no more than 500 mg/dose. This includes what is in your food and also what is in any supplements you are taking.      Dietary sources of calcium: These also contain vitamin D  Milk / buttermilk              8 oz            300 mg  Dry milk powder       5 Tbsp             300  mg  Yogurt                          1 cup           400 mg  Ice cream   1/2 cup          100 mg  Hard cheese                     1.5 oz          300 mg  Cottage cheese                1/2 cup        65 mg  Spinach, cooked  1 cup  240 mg  Tofu, soft regular           4 oz          120 to 390 mg  Sardines                       3 oz               370 mg  Mackerel canned         3 oz                250 mg  Canned salmon with bones 3 oz        170-210 mg  Calcium fortified cereals are available  SILK soy and almond milk products are calcium fortified  Orange juice  Fortified with Calcium       8 oz            300 mg  Low fat dairy sources are recommended    Recommended exercise goals:    30 minutes of moderate exercise on most days of the week .  Weight bearing exercise (ie, walking, jogging, hiking, dancing)    Strength training 2 or more times/week in addition to other weight -being exercise.  Strength training -- uses weight or resistance beyond that seen in everyday activities -(pilates, weight training with free weights, weight machines or resistance bands)    OSTEOPOROSIS of the spine: avoid exercise machines that incorporate spinal flexion, trunk rotation, or forward bending   Specifically avoid abdominal exercisers, stationary bikes with moving handles, cross-country ski machines, rowing machines, and bicep curl machines  Spinal fractures: AVOID activities that place significant force on the spine such as horse back riding,  tennis, golf or bowling    RTC: annual as needed based on results    Please use [facility] to schedule your appointment(s) or call [phone] to schedule in Endocrinology and Diabetes Clinic      Best Wishes,  Dr. Ling Jimenez MD, MPH  Endocrinologist    Video: 17 minutes    Total Time: 51 min spent on chart review, evaluation, management, counseling, education, & motivational interviewing on the day of encounter      Answers for HPI/ROS submitted by the patient on 6/22/2022  General  Symptoms: No  Skin Symptoms: No  HENT Symptoms: No  EYE SYMPTOMS: No  HEART SYMPTOMS: No  LUNG SYMPTOMS: No  INTESTINAL SYMPTOMS: No  URINARY SYMPTOMS: No  REPRODUCTIVE SYMPTOMS: No  SKELETAL SYMPTOMS: No  BLOOD SYMPTOMS: No  NERVOUS SYSTEM SYMPTOMS: No  MENTAL HEALTH SYMPTOMS: No

## 2022-06-29 NOTE — PROGRESS NOTES
Fabrice Donahue  is being evaluated via a billable video visit.      How would you like to obtain your AVS? [employer]  For the video visit, send the invitation by: Text to cell phone: [phone]  Will anyone else be joining your video visit? No         Diabetes & Endocrinology Clinic Follow-up   Consult     Reason for visit/consult: ED + Osteopenia + lowish testosterone    Primary care provider:     Wegener, Joel Daniel Irwin, MD     PCP - [facility], [facility]     Since 1/23/2015     [phone]     HPI:  57 yo male who I last saw 8/5/2020 who is seen in follow-up at initial consult request of Dr. Wegener for evaluation & management of ED, low libido, and hypogonadism with low-rosana, yet normal range testosterone of 303 on 7/23/20 collected at 9:58am and 244 a month prior and was 349 about 4 yrs prior. He has had noticed a slow decline in function. He has been taking sildenafil (Revatio) 20 mg recently and this has not been helping. He does not have spontaneous morning erections x past 3-4 months. He has 4 kids and did not have any issues with fertility. He has not been on testosterone replacement before. Libido is almost non-existent, started to notice a gradual decline over past 5-10 years. Last intercourse was several months ago and it was not to his satisfaction. He has some anxiety.  No chest pain or shortness of breath or CVD.    Plan when I saw him last on 8/5/2020 was:  ED, lack of spontaneous erections is concerning, with testosterone in low end of normal range. He could benefit from early morning (testosterone peaks in early am) labs to evaluate gonadotroph function, prolactin, thyroid axis. Refer to urology for lack of spontaneous erections and concern for vascular etiology.   Labs: see orders   RTC: TBD based on results    Since then, he has seen urology + labs below    Per Urology consult from 9/15/2020:  Assessment: 55 year old male with ED and low libido for the past few years. Testerone noted to be  on the lower end of normal range. Has tried sildenafil 40 mg, but has not self-stimulated after taking. We discussed that this is an important step to evaluate effectiveness and he will plan to try it again. We discussed his risk factors for ED, including HTN and HLD. Also discussed possible causes of low libido, including psychological (depression, anxiety, stress), low testosterone (his being on the lower end of normal), medications (is not taking SSRIs, etc.) chronic opioid use (not applicable) and inter partner issues (he denies). He does acknowledge that he may have some underlying depression and anxiety, although this is difficult to determine, given the lifestyle impact of the COVID-19 crisis. Discussed the potential utility of a referral to [facility], and he is interested.      We discussed next steps for ED management if the sildenafil is ineffective, including ICI. He is hesitant to consider this, but will plan to think about it.      Plan:  -Trial sildenafil again. Refill sent.  -Follow up with the [facility].  -Follow up with Dr. Ayoub for further evaluation, if needed, or if interested in ICI.         Mikki Borja, CNP  Department of Urology         He has improved his sleeping patterns. He fell down some stairs and had compression fractures. Dentist said he may have bone loss in his jaw. He was thinking he should start testosterone. He is doing cycling.     Latest Reference Range & Units 08/14/20 07:30   Free Testosterone Calculated 4.7 - 24.4 ng/dL 6.86   FSH 0.7 - 10.8 IU/L 5.2   PSA 0 - 4 ug/L 0.51   Hemoglobin 13.3 - 17.7 g/dL 14.9   Testosterone Total (11 months ago to 9 years ago, most recent 291):   240 - 950 ng/dL 291  344 CM  303 CM  244 CM  349 CM  252 CM  257       Past Medical/Surgical History:  Past Medical History:   Diagnosis Date     Benign neoplasm of skin of other and unspecified parts of face     atypical nevus     Essential hypertension, benign       Lyme disease 8/2007    hot welts, bull eye bruise     Mixed hyperlipidemia      Other chronic nonalcoholic liver disease 2006    Fatty liver     Shoulder pain, right     post clavicular fx     Syncope 7/08    neg Head/Neck CT     Unspecified hemorrhoids without mention of complication      Past Surgical History:   Procedure Laterality Date     COLONOSCOPY  04/2014     COLONOSCOPY N/A 8/26/2019    Procedure: COLONOSCOPY, WITH POLYPECTOMY;  Surgeon: José Baig MD;  [facility]: [facility]     HC US ABDOMEN COMPLETE  6/2006    fatty liver     HERNIA REPAIR  1984     SURGICAL HISTORY OF -   1982    L Inguinal Hernia     SURGICAL HISTORY OF -   8/09    R Clavicle Fracture Repair     ZZC ECHO HEART XTHORACIC,COMPLETE, W/O DOPPLER  8/08    Nl       Allergies:  Allergies   Allergen Reactions     Ivp Dye [Contrast Dye]        Current Medications MEDICATIONS IN THIS CHART MAY NOT BE ACCURATE.    Current Outpatient Medications   Medication     calcitonin, salmon, (MIACALCIN) 200 UNIT/ACT nasal spray     lisinopril (ZESTRIL) 10 MG tablet     Multiple Vitamin (MULTI-VITAMIN) per tablet     pravastatin (PRAVACHOL) 20 MG tablet     sildenafil (REVATIO) 20 MG tablet     No current facility-administered medications for this visit.       Family History:  Family History   Problem Relation Age of Onset     Hypertension Mother      Cancer Mother         kidney     Lipids Mother         high     Thyroid Disease Mother         hyper     Hyperlipidemia Mother      Respiratory Father         emphysema     Hypertension Maternal Grandmother      Cerebrovascular Disease Maternal Grandmother      Lipids Sister         high     Hypertension Sister      Lipids Brother         high     Hypertension Brother      Diabetes Maternal Aunt         type II     Hypertension Sister        Social History: . 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 Rare alcohol use. No drug use.  Social History     Tobacco  Use     Smoking status: Former Smoker     Packs/day: 0.50     Years: 5.00     Pack years: 2.50     Types: Cigarettes     Quit date: 10/1/2010     Years since quittin.7     Smokeless tobacco: Never Used     Tobacco comment: Already quit smoking   Substance Use Topics     Alcohol use: Yes     Comment: Less than 1 drink a week; occasional beer       ROS:  see HPI    Exam  No vitals, video visit  Vital Signs  2021   BMI (Calculated)  23.8     GEN: Healthy, alert and no distress  HEENT: EOMI  RESP: No audible wheeze, cough, or visible cyanosis  SKIN: Visible skin clear  NEURO: Cranial nerves grossly intact. Mentation and speech appropriate for age  PSYCH: Mentation appears normal, affect normal/bright, judgement and insight intact, normal speech and appearance well-groomed    Labs/Imaging    See HPI  [facility]O [facility] Latest Ref Rng & Units 2020   ACTH <47 pg/mL 27   CORTISOL, SERUM 4 - 22 ug/dL 12.0   FSH 0.7 - 10.8 IU/L 5.2   GLUCOSE 70 - 99 mg/dL    LH 1.5 - 9.3 IU/L 4.0   POTASSIUM 3.4 - 5.3 mmol/L    PROLACTIN 2 - 18 ug/L 12   PSA 0 - 4 ug/L 0.51   SEX HORMONE BINDING GLOBULIN 11 - 80 nmol/L 33   SODIUM 133 - 144 mmol/L    TESTOSTERONE FREE 8 - 30 ng/dL    TESTOSTERONE TOTAL 240 - 950 ng/dL 344   TSH 0.40 - 4.00 mU/L 3.03   T4 FREE 0.76 - 1.46 ng/dL 1.07     BONE DENSITOMETRY  [address] [address]  2021      PATIENT: Fabrice Donahue  CHART: [id number]   :  1965  AGE:  55 year old  SEX:  male   REFERRING PROVIDER:  Wegener, Joel Daniel Irwin, MD     PROCEDURE:  Bone density scanning was performed using DXA technology of the lumbar spine and hip.  Scanning was performed on a Lunar Prodigy scanner.  Reporting is completed in the form of a T-score.  The T-score represents the standard deviation from peak bone mass based on a young healthy adult.     REFERENCE T-SCORES:       Normal                -1.0 and greater                                  Osteopenia         Between -1.0 and -2.5                                           Osteoporosis     -2.5 and less                                       RISK FACTORS:  Fracture of collar bone, reported maxillary bone loss     CURRENT TREATMENT:  None listed     FINDINGS:               Lumbar Spine (L1-L4)      T-score:  -0.9               Left Femoral Neck            T-score:  -1.5               Right Femoral Neck          T-score:  -1.8                            Lumbar (L1-L4) BMD: 1.120  Previous: n/a                  Total Hip Mean BMD: 1.001  Previous: n/a        IMPRESSION  Osteopenia (low bone mass)  Degenerative changes of the spine  Recommendations include ensuring adequate daily Calcium and Vitamin D intake  Follow up scan can be considered in five years.     Current NOF guidelines recommend treatment for patients with the following:  - Prior hip or vertebral fracture  - T-score -2.5 or below  - A 10 year risk of any major osteoporotic fracture >20% or 10 year risk of hip fracture >3%, as calculated using the FRAX calculator (www.shef.ac.uk/FRAX).       This patient's risks with the use of FRAX (based on available information) are 5.9 % for major osteoporotic fracture and 0.8 % for hip fracture.   Based on these guidelines, treatment (in addition to calcium and vitamin D) is not recommended for this patient.  While this is meant as an aid to clinical decision-making, clinical judgment must still be used.         LORENZO FELTON M.D.     Assessment and Plan    1) Hypogonadotrophic Hypogonadism for unclear reasons likely 2/2 psychophysiologic insomnia with lowish testosterone and ED & Osteopenia. While technically his testosterone is in the normal range, he could benefit from increasing it to middle of the normal range for his Osteopenia w/ ho compression fractures and ED both. Benefits>risks here. Pituitary evaluation above was otherwise normal. Will repeat & update in rare chance  there is another etiology.  Will update labs of PSA, free & total testosterone, hemoglobin, pituitary evaluation again PRIOR to starting testosterone 50 mg IM weekly.    2) Osteopenia w/ ho provoked compression fractures. He is taking vit D 5,000 international unit(s) daily + calcitonin nasal (ran out of the latter). He is instructed to do weight bearing activity + take calcium in his diet + restart calcitonin nasal. Vit D lab is pending.  Next DXA due anytime after 7/2023    Labs, see orders    Patient Instructions:    Nice to talk with you today in virtual clinic!    for now, please continue vit D 5,000 international unit(s) daily + calcitonin salmon nasal spray.    AFTER YOUR LABS BELOW ARE DONE, OK TO START TESTOSTERONE 50 MG IM WEEKLY    EARLY IN THE MORNING, Please go to any [facility] lab.  Follow standard safety precautions for COVID-19. Practice social isolation, hand hygiene, do not touch your face, nose, or mouth, wear mask if have to go out in public to be respectful of community.  Please contact us to schedule at any of our [facility] lab locations. Call [phone] ([phone]), select option 1.    - Do weight bearing exercises  - Repeat DXA in 2 years after last one or about 7/2023  _________________________________________________________________________    CALCIUM Recommendation 1500 mg/day in divided dose of no more than 500 mg/dose. This includes what is in your food and also what is in any supplements you are taking.      Dietary sources of calcium: These also contain vitamin D  Milk / buttermilk              8 oz            300 mg  Dry milk powder       5 Tbsp             300 mg  Yogurt                          1 cup           400 mg  Ice cream   1/2 cup          100 mg  Hard cheese                     1.5 oz          300 mg  Cottage cheese                1/2 cup        65 mg  Spinach, cooked  1 cup  240 mg  Tofu, soft regular           4 oz          120 to 390 mg  Sardines                        3 oz               370 mg  Mackerel canned         3 oz                250 mg  Canned salmon with bones 3 oz        170-210 mg  Calcium fortified cereals are available  SILK soy and almond milk products are calcium fortified  Orange juice  Fortified with Calcium       8 oz            300 mg  Low fat dairy sources are recommended    Recommended exercise goals:    30 minutes of moderate exercise on most days of the week .  Weight bearing exercise (ie, walking, jogging, hiking, dancing)    Strength training 2 or more times/week in addition to other weight -being exercise.  Strength training -- uses weight or resistance beyond that seen in everyday activities -(pilates, weight training with free weights, weight machines or resistance bands)    OSTEOPOROSIS of the spine: avoid exercise machines that incorporate spinal flexion, trunk rotation, or forward bending   Specifically avoid abdominal exercisers, stationary bikes with moving handles, cross-country ski machines, rowing machines, and bicep curl machines  Spinal fractures: AVOID activities that place significant force on the spine such as horse back riding,  tennis, golf or bowling    RTC: annual as needed based on results    Please use [employer] to schedule your appointment(s) or call [phone] to schedule in Endocrinology and Diabetes Clinic      Best Wishes,  Dr. Ling Jimenez MD, MPH  Endocrinologist    Video: 17 minutes    Total Time: 51 min spent on chart review, evaluation, management, counseling, education, & motivational interviewing on the day of encounter      Answers for HPI/ROS submitted by the patient on 6/22/2022  General Symptoms: No  Skin Symptoms: No  HENT Symptoms: No  EYE SYMPTOMS: No  HEART SYMPTOMS: No  LUNG SYMPTOMS: No  INTESTINAL SYMPTOMS: No  URINARY SYMPTOMS: No  REPRODUCTIVE SYMPTOMS: No  SKELETAL SYMPTOMS: No  BLOOD SYMPTOMS: No  NERVOUS SYSTEM SYMPTOMS: No  MENTAL HEALTH SYMPTOMS: No

## 2022-07-01 ENCOUNTER — LAB (OUTPATIENT)
Dept: LAB | Facility: CLINIC | Age: 57
End: 2022-07-01
Payer: COMMERCIAL

## 2022-07-01 DIAGNOSIS — I10 HYPERTENSION GOAL BP (BLOOD PRESSURE) < 140/90: ICD-10-CM

## 2022-07-01 DIAGNOSIS — N52.8 OTHER MALE ERECTILE DYSFUNCTION: ICD-10-CM

## 2022-07-01 DIAGNOSIS — R68.82 LOW LIBIDO: ICD-10-CM

## 2022-07-01 DIAGNOSIS — M80.00XA OSTEOPOROSIS WITH CURRENT PATHOLOGICAL FRACTURE, UNSPECIFIED OSTEOPOROSIS TYPE, INITIAL ENCOUNTER: ICD-10-CM

## 2022-07-01 DIAGNOSIS — E78.5 HYPERLIPIDEMIA LDL GOAL <130: ICD-10-CM

## 2022-07-01 DIAGNOSIS — E29.1 HYPOGONADISM MALE: ICD-10-CM

## 2022-07-01 DIAGNOSIS — M85.80 OSTEOPENIA, UNSPECIFIED LOCATION: ICD-10-CM

## 2022-07-01 DIAGNOSIS — F51.04 PSYCHOPHYSIOLOGIC INSOMNIA: ICD-10-CM

## 2022-07-01 LAB
ACTH PLAS-MCNC: <10 PG/ML
CORTIS SERPL-MCNC: 10.2 UG/DL
FSH SERPL IRP2-ACNC: 5.7 MIU/ML (ref 1.5–12.4)
HGB BLD-MCNC: 15.4 G/DL (ref 13.3–17.7)
IGF-I BLD-MCNC: 226 NG/ML (ref 56–203)
LH SERPL-ACNC: 4 MIU/ML (ref 1.7–8.6)
Lab: NORMAL
Lab: NORMAL
PERFORMING LABORATORY: NORMAL
PERFORMING LABORATORY: NORMAL
PROLACTIN SERPL 3RD IS-MCNC: 7 NG/ML (ref 4–15)
SHBG SERPL-SCNC: 26 NMOL/L (ref 11–80)
SPECIMEN STATUS: NORMAL
SPECIMEN STATUS: NORMAL
TEST NAME: NORMAL
TEST NAME: NORMAL

## 2022-07-01 PROCEDURE — 84305 ASSAY OF SOMATOMEDIN: CPT

## 2022-07-01 PROCEDURE — 83002 ASSAY OF GONADOTROPIN (LH): CPT

## 2022-07-01 PROCEDURE — 82306 VITAMIN D 25 HYDROXY: CPT

## 2022-07-01 PROCEDURE — 82024 ASSAY OF ACTH: CPT

## 2022-07-01 PROCEDURE — 84270 ASSAY OF SEX HORMONE GLOBUL: CPT

## 2022-07-01 PROCEDURE — 82533 TOTAL CORTISOL: CPT

## 2022-07-01 PROCEDURE — 84439 ASSAY OF FREE THYROXINE: CPT

## 2022-07-01 PROCEDURE — 85018 HEMOGLOBIN: CPT

## 2022-07-01 PROCEDURE — 83001 ASSAY OF GONADOTROPIN (FSH): CPT

## 2022-07-01 PROCEDURE — 84146 ASSAY OF PROLACTIN: CPT

## 2022-07-01 PROCEDURE — 84403 ASSAY OF TOTAL TESTOSTERONE: CPT

## 2022-07-01 PROCEDURE — 84443 ASSAY THYROID STIM HORMONE: CPT

## 2022-07-01 PROCEDURE — 84153 ASSAY OF PSA TOTAL: CPT

## 2022-07-01 PROCEDURE — 82397 CHEMILUMINESCENT ASSAY: CPT

## 2022-07-01 PROCEDURE — 36415 COLL VENOUS BLD VENIPUNCTURE: CPT

## 2022-07-01 RX ORDER — NEEDLES, DISPOSABLE 25GX5/8"
NEEDLE, DISPOSABLE MISCELLANEOUS
Qty: 12 EACH | Refills: 1 | Status: SHIPPED | OUTPATIENT
Start: 2022-07-01 | End: 2022-09-09

## 2022-07-01 NOTE — TELEPHONE ENCOUNTER
RE    re: pls put in order for supplies  Received: 2 days ago  Ling Jimenez MD  [facility]  As per Pharmacy request, sary put in prescriptions for testosterone draw needles, inject needles, and syringes #10 of each to [facility] on [address] In [place] .Call them with Qs [phone]     He is starting testosterone 50 mg IM weekly     Thank you,   Dr. Ling Jimenez MD, MPH   Endocrinologist

## 2022-07-02 LAB
PSA SERPL-MCNC: 0.48 UG/L (ref 0–4)
T4 FREE SERPL-MCNC: 1.05 NG/DL (ref 0.76–1.46)
TSH SERPL DL<=0.005 MIU/L-ACNC: 2.32 MU/L (ref 0.4–4)

## 2022-07-03 LAB — MAYO MISC RESULT: NORMAL

## 2022-07-05 LAB — MAYO MISC RESULT: NORMAL

## 2022-07-06 LAB
TESTOST FREE SERPL-MCNC: 7.01 NG/DL
TESTOST SERPL-MCNC: 311 NG/DL (ref 240–950)

## 2022-07-08 ASSESSMENT — ENCOUNTER SYMPTOMS
DIARRHEA: 0
FREQUENCY: 0
CHILLS: 0
HEMATOCHEZIA: 0
EYE PAIN: 0
ABDOMINAL PAIN: 0
HEADACHES: 0
HEARTBURN: 0
COUGH: 0
SORE THROAT: 0
JOINT SWELLING: 0
NERVOUS/ANXIOUS: 0
DIZZINESS: 0
SHORTNESS OF BREATH: 0
ARTHRALGIAS: 0
HEMATURIA: 0
MYALGIAS: 0
CONSTIPATION: 0
NAUSEA: 0
WEAKNESS: 0
PALPITATIONS: 0
FEVER: 0
DYSURIA: 0
PARESTHESIAS: 0

## 2022-07-09 LAB
DEPRECATED CALCIDIOL+CALCIFEROL SERPL-MC: <59 UG/L (ref 20–75)
VITAMIN D2 SERPL-MCNC: <5 UG/L
VITAMIN D3 SERPL-MCNC: 54 UG/L

## 2022-07-09 ASSESSMENT — ENCOUNTER SYMPTOMS
WEAKNESS: 0
COUGH: 0
MYALGIAS: 0
DIZZINESS: 0
CONSTIPATION: 0
FREQUENCY: 0
ABDOMINAL PAIN: 0
PARESTHESIAS: 0
CHILLS: 0
NAUSEA: 0
ARTHRALGIAS: 0
NERVOUS/ANXIOUS: 0
DIARRHEA: 0
EYE PAIN: 0
JOINT SWELLING: 0
HEARTBURN: 0
HEADACHES: 0
HEMATURIA: 0
DYSURIA: 0
SORE THROAT: 0
FEVER: 0
SHORTNESS OF BREATH: 0
PALPITATIONS: 0
HEMATOCHEZIA: 0

## 2022-07-09 NOTE — PATIENT INSTRUCTIONS
"ASSESSMENT AND PLAN  1. Routine general medical examination at a health care facility  Follow up one year.  Tdap today.     2. Hypertension goal BP (blood pressure) < 130/80  At goal, no changes.   - COMPREHENSIVE METABOLIC PANEL; Future  - Albumin Random Urine Quantitative with Creat Ratio; Future  - lisinopril (ZESTRIL) 10 MG tablet; TAKE ONE TABLET (10MG) BY MOUTH DAILY.  Dispense: 90 tablet; Refill: 3    3. Hyperlipidemia LDL goal <130    - Lipid panel reflex to direct LDL Fasting; Future  - pravastatin (PRAVACHOL) 20 MG tablet; Take 1 tablet (20 mg) by mouth daily  Dispense: 90 tablet; Refill: 3    4. Erectile dysfunction, unspecified erectile dysfunction type    - sildenafil (REVATIO) 20 MG tablet; 2-4 tablets (40-80mg) prior to sexual activity daily as needed.  Dispense: 20 tablet; Refill: 11    5. Need for tetanus booster    - TDAP VACCINE (Adacel, Boostrix)    6. Osteoporosis with current pathological fracture with routine healing, unspecified osteoporosis type, subsequent encounter  Managed by [facility].     7. Hypotestosteronism  Has had psa already on testosterone therapy per Dr. Jimenez .         [facility] SIGN UP: [email or web address] , [phone]    E-VISITS CAN BE DONE FOR CARE/PRESCRIPTIONS WHICH MAY NOT NEED AN IN-PERSON ASSESSMENT - click \"on-line care, then request e-visit\".      ONCARE VISIT/PRESCRIPTIONS: [email or web address]  - we treat nearly 50 common conditions with one hour response time     RADIOLOGY SCHEDULING  [facility]:  [phone]   [facility]: [phone]  [facility]: [phone]    Mammogram and Colonoscopy [phone]    Smoking Cessation: [email or web address], 6-812-727-PLAN ([phone])    [facility] Scheduling:  [phone].    CONSUMER PRICE LINE for estimates of test costs:  [phone]       Preventive Health Recommendations  Male Ages 50 - 64    Yearly exam:             See your health care provider every year in " order to  o   Review health changes.   o   Discuss preventive care.    o   Review your medicines if your doctor has prescribed any.   Have a cholesterol test every 5 years, or more frequently if you are at risk for high cholesterol/heart disease.   Have a diabetes test (fasting glucose) every three years. If you are at risk for diabetes, you should have this test more often.   Have a colonoscopy at age 50, or have a yearly FIT test (stool test). These exams will check for colon cancer.    Talk with your health care provider about whether or not a prostate cancer screening test (PSA) is right for you.  You should be tested each year for STDs (sexually transmitted diseases), if you re at risk.     Shots: Get a flu shot each year. Get a tetanus shot every 10 years.     Nutrition:  Eat at least 5 servings of fruits and vegetables daily.   Eat whole-grain bread, whole-wheat pasta and brown rice instead of white grains and rice.   Get adequate Calcium and Vitamin D.     Lifestyle  Exercise for at least 150 minutes a week (30 minutes a day, 5 days a week). This will help you control your weight and prevent disease.   Limit alcohol to one drink per day.   No smoking.   Wear sunscreen to prevent skin cancer.   See your dentist every six months for an exam and cleaning.   See your eye doctor every 1 to 2 years.    Preventive Health Recommendations  Male Ages 50 - 64    Yearly exam:             See your health care provider every year in order to  o   Review health changes.   o   Discuss preventive care.    o   Review your medicines if your doctor has prescribed any.   Have a cholesterol test every 5 years, or more frequently if you are at risk for high cholesterol/heart disease.   Have a diabetes test (fasting glucose) every three years. If you are at risk for diabetes, you should have this test more often.   Have a colonoscopy at age 50, or have a yearly FIT test (stool test). These exams will check for colon cancer.    Talk  with your health care provider about whether or not a prostate cancer screening test (PSA) is right for you.  You should be tested each year for STDs (sexually transmitted diseases), if you re at risk.     Shots: Get a flu shot each year. Get a tetanus shot every 10 years.     Nutrition:  Eat at least 5 servings of fruits and vegetables daily.   Eat whole-grain bread, whole-wheat pasta and brown rice instead of white grains and rice.   Get adequate Calcium and Vitamin D.     Lifestyle  Exercise for at least 150 minutes a week (30 minutes a day, 5 days a week). This will help you control your weight and prevent disease.   Limit alcohol to one drink per day.   No smoking.   Wear sunscreen to prevent skin cancer.   See your dentist every six months for an exam and cleaning.   See your eye doctor every 1 to 2 years.

## 2022-07-09 NOTE — PROGRESS NOTES
SUBJECTIVE:   CC: Fabrice Donahue is an 56 year old male who presents for preventative health visit.       Patient has been advised of split billing requirements and indicates understanding: Yes  Healthy Habits:     Getting at least 3 servings of Calcium per day:  NO    Bi-annual eye exam:  Yes    Dental care twice a year:  Yes    Sleep apnea or symptoms of sleep apnea:  Daytime drowsiness    Diet:  Vegetarian/vegan    Frequency of exercise:  4-5 days/week    Duration of exercise:  Greater than 60 minutes    Taking medications regularly:  Yes    Medication side effects:  Muscle aches    PHQ-2 Total Score: 1    Additional concerns today:  Yes    Compression fracture march after falling on stairs.  Now being treated for osteoporosis/hypotestosteronism via [facility].  Feeling well now.  Continues biking for exercise 3500miles last year.       Despite phq2 not feeling depressed.  Was just down about health problems but improving now that recovering.     Today's PHQ-2 Score:   PHQ-2 (  [employer]) 2022   Q1: Little interest or pleasure in doing things 0   Q2: Feeling down, depressed or hopeless 1   PHQ-2 Score 1   PHQ-2 Total Score (12-17 Years)- Positive if 3 or more points; Administer PHQ-A if positive -   Q1: Little interest or pleasure in doing things Not at all   Q2: Feeling down, depressed or hopeless Several days   PHQ-2 Score 1       Abuse: Current or Past(Physical, Sexual or Emotional)- No  Do you feel safe in your environment? Yes        Social History     Tobacco Use     Smoking status: Former Smoker     Packs/day: 0.50     Years: 5.00     Pack years: 2.50     Types: Cigarettes     Quit date: 10/1/2010     Years since quittin.7     Smokeless tobacco: Never Used     Tobacco comment: Already quit smoking   Substance Use Topics     Alcohol use: Yes     Comment: Less than 1 drink a week; occasional beer     If you drink alcohol do you typically have >3 drinks per day or >7 drinks per week? No    Alcohol  Use 7/8/2022   Prescreen: >3 drinks/day or >7 drinks/week? No   Prescreen: >3 drinks/day or >7 drinks/week? -   No flowsheet data found.    Last PSA:   PSA   Date Value Ref Range Status   08/14/2020 0.51 0 - 4 ug/L Final     Comment:     Assay Method:  Chemiluminescence using Siemens Vista analyzer     PSA Tumor Marker   Date Value Ref Range Status   07/01/2022 0.48 0.00 - 4.00 ug/L Final       Reviewed orders with patient. Reviewed health maintenance and updated orders accordingly - Yes      Reviewed and updated as needed this visit by clinical staff                    Reviewed and updated as needed this visit by Provider                   Past Medical History:   Diagnosis Date     Benign neoplasm of skin of other and unspecified parts of face     atypical nevus     Essential hypertension, benign      Lyme disease 8/2007    hot welts, bull eye bruise     Mixed hyperlipidemia      Other chronic nonalcoholic liver disease 2006    Fatty liver     Shoulder pain, right     post clavicular fx     Syncope 7/08    neg Head/Neck CT     Unspecified hemorrhoids without mention of complication       Past Surgical History:   Procedure Laterality Date     COLONOSCOPY  04/2014     COLONOSCOPY N/A 8/26/2019    Procedure: COLONOSCOPY, WITH POLYPECTOMY;  Surgeon: José Baig MD;  Location: [facility]      US ABDOMEN COMPLETE  6/2006    fatty liver     HERNIA REPAIR  1984     SURGICAL HISTORY OF -   1982    L Inguinal Hernia     SURGICAL HISTORY OF -   8/09    R Clavicle Fracture Repair     ZZC ECHO HEART XTHORACIC,COMPLETE, W/O DOPPLER  8/08    Nl       Review of Systems   Constitutional: Negative for chills and fever.   HENT: Negative for congestion, ear pain, hearing loss and sore throat.    Eyes: Negative for pain and visual disturbance.   Respiratory: Negative for cough and shortness of breath.    Cardiovascular: Negative for chest pain, palpitations and peripheral edema.   Gastrointestinal: Negative for abdominal pain,  constipation, diarrhea, heartburn, hematochezia and nausea.   Genitourinary: Positive for impotence. Negative for dysuria, frequency, genital sores, hematuria, penile discharge and urgency.   Musculoskeletal: Negative for arthralgias, joint swelling and myalgias.   Skin: Negative for rash.   Neurological: Negative for dizziness, weakness, headaches and paresthesias.   Psychiatric/Behavioral: Negative for mood changes. The patient is not nervous/anxious.          OBJECTIVE:   There were no vitals taken for this visit.    Physical Exam  GENERAL: healthy, alert and no distress  EYES: Eyes grossly normal to inspection, PERRL and conjunctivae and sclerae normal    NECK: no adenopathy, no asymmetry, masses, or scars and thyroid normal to palpation  RESP: lungs clear to auscultation - no rales, rhonchi or wheezes  CV: regular rate and rhythm, normal S1 S2, no S3 or S4, no murmur, click or rub, no peripheral edema and peripheral pulses strong  ABDOMEN: soft, nontender, no hepatosplenomegaly, no masses and bowel sounds normal  RECTAL: normal sphincter tone, no rectal masses, prostate normal size, smooth, nontender without nodules or masses  MS: no gross musculoskeletal defects noted, no edema  SKIN: no suspicious lesions or rashes  NEURO: Normal strength and tone, mentation intact and speech normal  PSYCH: mentation appears normal, affect normal/bright        ASSESSMENT/PLAN:       ICD-10-CM    1. Routine general medical examination at a health care facility  Z00.00    2. Hypertension goal BP (blood pressure) < 130/80  I10 COMPREHENSIVE METABOLIC PANEL     Albumin Random Urine Quantitative with Creat Ratio     lisinopril (ZESTRIL) 10 MG tablet   3. Hyperlipidemia LDL goal <130  E78.5 Lipid panel reflex to direct LDL Fasting     pravastatin (PRAVACHOL) 20 MG tablet   4. Erectile dysfunction, unspecified erectile dysfunction type  N52.9 sildenafil (REVATIO) 20 MG tablet   5. Need for tetanus booster  Z23 TDAP VACCINE (Adacel,  "Boostrix)   6. Osteoporosis with current pathological fracture with routine healing, unspecified osteoporosis type, subsequent encounter  M80.00XD    7. Hypotestosteronism  E34.9        Patient has been advised of split billing requirements and indicates understanding: Yes    COUNSELING:   Reviewed preventive health counseling, as reflected in patient instructions       Regular exercise       Healthy diet/nutrition       Colorectal cancer screening       Prostate cancer screening       Osteoporosis prevention/bone health       Consider lung cancer screening for ages 55-80 years (77 for Medicare) and 20 pack-year smoking history : does not qualify, removed from .     Estimated body mass index is 23.78 kg/m  as calculated from the following:    Height as of 7/12/21: 1.753 m (5' 9\").    Weight as of 4/9/22: 73 kg (161 lb).         He reports that he quit smoking about 11 years ago. His smoking use included cigarettes. He has a 2.50 pack-year smoking history. He has never used smokeless tobacco.      Counseling Resources:  ATP IV Guidelines  Pooled Cohorts Equation Calculator  FRAX Risk Assessment  ICSI Preventive Guidelines  Dietary Guidelines for Americans, 2010  USDA's MyPlate  ASA Prophylaxis  Lung CA Screening    Joel Daniel Wegener, MD  [facility]  "

## 2022-07-11 ENCOUNTER — OFFICE VISIT (OUTPATIENT)
Dept: FAMILY MEDICINE | Facility: CLINIC | Age: 57
End: 2022-07-11
Payer: COMMERCIAL

## 2022-07-11 VITALS
WEIGHT: 167.8 LBS | HEART RATE: 75 BPM | DIASTOLIC BLOOD PRESSURE: 80 MMHG | HEIGHT: 69 IN | SYSTOLIC BLOOD PRESSURE: 118 MMHG | BODY MASS INDEX: 24.85 KG/M2 | OXYGEN SATURATION: 98 % | TEMPERATURE: 96.9 F | RESPIRATION RATE: 16 BRPM

## 2022-07-11 DIAGNOSIS — I10 HYPERTENSION GOAL BP (BLOOD PRESSURE) < 130/80: ICD-10-CM

## 2022-07-11 DIAGNOSIS — M80.00XD OSTEOPOROSIS WITH CURRENT PATHOLOGICAL FRACTURE WITH ROUTINE HEALING, UNSPECIFIED OSTEOPOROSIS TYPE, SUBSEQUENT ENCOUNTER: ICD-10-CM

## 2022-07-11 DIAGNOSIS — E34.9 HYPOTESTOSTERONISM: ICD-10-CM

## 2022-07-11 DIAGNOSIS — N52.9 ERECTILE DYSFUNCTION, UNSPECIFIED ERECTILE DYSFUNCTION TYPE: ICD-10-CM

## 2022-07-11 DIAGNOSIS — Z23 NEED FOR TETANUS BOOSTER: ICD-10-CM

## 2022-07-11 DIAGNOSIS — E78.5 HYPERLIPIDEMIA LDL GOAL <130: ICD-10-CM

## 2022-07-11 DIAGNOSIS — Z00.00 ROUTINE GENERAL MEDICAL EXAMINATION AT A HEALTH CARE FACILITY: Primary | ICD-10-CM

## 2022-07-11 PROCEDURE — 99396 PREV VISIT EST AGE 40-64: CPT | Mod: 25 | Performed by: FAMILY MEDICINE

## 2022-07-11 PROCEDURE — 80061 LIPID PANEL: CPT | Performed by: FAMILY MEDICINE

## 2022-07-11 PROCEDURE — 90715 TDAP VACCINE 7 YRS/> IM: CPT | Performed by: FAMILY MEDICINE

## 2022-07-11 PROCEDURE — 80053 COMPREHEN METABOLIC PANEL: CPT | Performed by: FAMILY MEDICINE

## 2022-07-11 PROCEDURE — 82043 UR ALBUMIN QUANTITATIVE: CPT | Performed by: FAMILY MEDICINE

## 2022-07-11 PROCEDURE — 36415 COLL VENOUS BLD VENIPUNCTURE: CPT | Performed by: FAMILY MEDICINE

## 2022-07-11 PROCEDURE — 90471 IMMUNIZATION ADMIN: CPT | Performed by: FAMILY MEDICINE

## 2022-07-11 RX ORDER — LISINOPRIL 10 MG/1
TABLET ORAL
Qty: 90 TABLET | Refills: 3 | Status: SHIPPED | OUTPATIENT
Start: 2022-07-11 | End: 2023-07-25

## 2022-07-11 RX ORDER — SILDENAFIL CITRATE 20 MG/1
TABLET ORAL
Qty: 20 TABLET | Refills: 11 | Status: SHIPPED | OUTPATIENT
Start: 2022-07-11 | End: 2024-02-20

## 2022-07-11 RX ORDER — PRAVASTATIN SODIUM 20 MG
20 TABLET ORAL DAILY
Qty: 90 TABLET | Refills: 3 | Status: SHIPPED | OUTPATIENT
Start: 2022-07-11 | End: 2023-07-25

## 2022-07-13 LAB
ALBUMIN SERPL-MCNC: 4.5 G/DL (ref 3.4–5)
ALP SERPL-CCNC: 67 U/L (ref 40–150)
ALT SERPL W P-5'-P-CCNC: 24 U/L (ref 0–70)
ANION GAP SERPL CALCULATED.3IONS-SCNC: 13 MMOL/L (ref 3–14)
AST SERPL W P-5'-P-CCNC: 20 U/L (ref 0–45)
BILIRUB SERPL-MCNC: 0.8 MG/DL (ref 0.2–1.3)
BUN SERPL-MCNC: 17 MG/DL (ref 7–30)
CALCIUM SERPL-MCNC: 9.9 MG/DL (ref 8.5–10.1)
CHLORIDE BLD-SCNC: 104 MMOL/L (ref 94–109)
CHOLEST SERPL-MCNC: 176 MG/DL
CO2 SERPL-SCNC: 24 MMOL/L (ref 20–32)
CREAT SERPL-MCNC: 0.67 MG/DL (ref 0.66–1.25)
CREAT UR-MCNC: 150 MG/DL
FASTING STATUS PATIENT QL REPORTED: YES
GFR SERPL CREATININE-BSD FRML MDRD: >90 ML/MIN/1.73M2
GLUCOSE BLD-MCNC: 93 MG/DL (ref 70–99)
HDLC SERPL-MCNC: 29 MG/DL
LDLC SERPL CALC-MCNC: 70 MG/DL
MICROALBUMIN UR-MCNC: 9 MG/L
MICROALBUMIN/CREAT UR: 6 MG/G CR (ref 0–17)
NONHDLC SERPL-MCNC: 147 MG/DL
POTASSIUM BLD-SCNC: 4.1 MMOL/L (ref 3.4–5.3)
PROT SERPL-MCNC: 7.6 G/DL (ref 6.8–8.8)
SODIUM SERPL-SCNC: 141 MMOL/L (ref 133–144)
TRIGL SERPL-MCNC: 386 MG/DL

## 2022-09-06 DIAGNOSIS — I10 HYPERTENSION GOAL BP (BLOOD PRESSURE) < 140/90: ICD-10-CM

## 2022-09-06 DIAGNOSIS — N52.8 OTHER MALE ERECTILE DYSFUNCTION: ICD-10-CM

## 2022-09-06 DIAGNOSIS — F51.04 PSYCHOPHYSIOLOGIC INSOMNIA: ICD-10-CM

## 2022-09-06 DIAGNOSIS — M85.80 OSTEOPENIA, UNSPECIFIED LOCATION: ICD-10-CM

## 2022-09-06 DIAGNOSIS — E29.1 HYPOGONADISM MALE: ICD-10-CM

## 2022-09-06 DIAGNOSIS — R68.82 LOW LIBIDO: ICD-10-CM

## 2022-09-06 DIAGNOSIS — M80.00XA OSTEOPOROSIS WITH CURRENT PATHOLOGICAL FRACTURE, UNSPECIFIED OSTEOPOROSIS TYPE, INITIAL ENCOUNTER: ICD-10-CM

## 2022-09-06 DIAGNOSIS — E78.5 HYPERLIPIDEMIA LDL GOAL <130: ICD-10-CM

## 2022-09-06 RX ORDER — TESTOSTERONE CYPIONATE 200 MG/ML
50 INJECTION, SOLUTION INTRAMUSCULAR WEEKLY
Qty: 10 ML | Refills: 0 | Status: SHIPPED | OUTPATIENT
Start: 2022-09-06 | End: 2022-09-06

## 2022-09-06 RX ORDER — TESTOSTERONE CYPIONATE 200 MG/ML
50 INJECTION, SOLUTION INTRAMUSCULAR WEEKLY
Qty: 10 ML | Refills: 0 | Status: SHIPPED | OUTPATIENT
Start: 2022-09-06 | End: 2022-11-21

## 2022-09-07 NOTE — TELEPHONE ENCOUNTER
Patient of Dr. Jimenez who has left the practice.   Refill for testosterone requested.   This on-call provider refilled prescription until patient establishes care with a provider.   Mecca Valencia MD

## 2022-09-08 DIAGNOSIS — E29.1 MALE HYPOGONADISM: ICD-10-CM

## 2022-09-09 RX ORDER — NEEDLES, DISPOSABLE 25GX5/8"
NEEDLE, DISPOSABLE MISCELLANEOUS
Qty: 12 EACH | Refills: 3 | Status: SHIPPED | OUTPATIENT
Start: 2022-09-09 | End: 2024-02-20

## 2022-09-09 RX ORDER — NEEDLES, DISPOSABLE 25GX5/8"
NEEDLE, DISPOSABLE MISCELLANEOUS
Qty: 12 EACH | Refills: 3 | Status: SHIPPED | OUTPATIENT
Start: 2022-09-09 | End: 2022-11-21

## 2022-09-27 ENCOUNTER — MYC MEDICAL ADVICE (OUTPATIENT)
Dept: FAMILY MEDICINE | Facility: CLINIC | Age: 57
End: 2022-09-27

## 2022-10-05 ENCOUNTER — TELEPHONE (OUTPATIENT)
Dept: ENDOCRINOLOGY | Facility: CLINIC | Age: 57
End: 2022-10-05

## 2022-10-05 NOTE — TELEPHONE ENCOUNTER
[facility] Call Center    Phone Message    May a detailed message be left on voicemail: yes     Reason for Call: Other: patient is a former Ling Jimenez patient and is on testosterone shots. Unsure as his next appointment isn't till 2/13/2023 if we need to run labs to make sure the injections are working or what the plan might be.  Thank you.     Action Taken: Other: Endo    Travel Screening: Not Applicable

## 2022-10-05 NOTE — TELEPHONE ENCOUNTER
Fabrice is ok with Testosterone results thus far for Testosterone 50 mg . He will wait until his follow up with new provider for any lab orders  Geena Kaye RN on 10/5/2022 at 4:05 PM

## 2022-10-24 ENCOUNTER — OFFICE VISIT (OUTPATIENT)
Dept: URGENT CARE | Facility: URGENT CARE | Age: 57
End: 2022-10-24
Payer: COMMERCIAL

## 2022-10-24 VITALS
WEIGHT: 164 LBS | TEMPERATURE: 97.9 F | OXYGEN SATURATION: 97 % | HEART RATE: 78 BPM | DIASTOLIC BLOOD PRESSURE: 81 MMHG | BODY MASS INDEX: 24.29 KG/M2 | SYSTOLIC BLOOD PRESSURE: 133 MMHG | HEIGHT: 69 IN

## 2022-10-24 DIAGNOSIS — R30.0 DYSURIA: ICD-10-CM

## 2022-10-24 DIAGNOSIS — N30.00 ACUTE CYSTITIS WITHOUT HEMATURIA: Primary | ICD-10-CM

## 2022-10-24 LAB
ALBUMIN UR-MCNC: NEGATIVE MG/DL
APPEARANCE UR: CLEAR
BACTERIA #/AREA URNS HPF: ABNORMAL /HPF
BILIRUB UR QL STRIP: NEGATIVE
COLOR UR AUTO: YELLOW
GLUCOSE UR STRIP-MCNC: NEGATIVE MG/DL
HGB UR QL STRIP: ABNORMAL
KETONES UR STRIP-MCNC: NEGATIVE MG/DL
LEUKOCYTE ESTERASE UR QL STRIP: ABNORMAL
NITRATE UR QL: NEGATIVE
PH UR STRIP: 7.5 [PH] (ref 5–7)
RBC #/AREA URNS AUTO: ABNORMAL /HPF
SP GR UR STRIP: 1.01 (ref 1–1.03)
UROBILINOGEN UR STRIP-ACNC: 0.2 E.U./DL
WBC #/AREA URNS AUTO: ABNORMAL /HPF

## 2022-10-24 PROCEDURE — 87088 URINE BACTERIA CULTURE: CPT | Performed by: PHYSICIAN ASSISTANT

## 2022-10-24 PROCEDURE — 99213 OFFICE O/P EST LOW 20 MIN: CPT | Performed by: PHYSICIAN ASSISTANT

## 2022-10-24 PROCEDURE — 87186 SC STD MICRODIL/AGAR DIL: CPT | Performed by: PHYSICIAN ASSISTANT

## 2022-10-24 PROCEDURE — 81001 URINALYSIS AUTO W/SCOPE: CPT | Performed by: PHYSICIAN ASSISTANT

## 2022-10-24 PROCEDURE — 87086 URINE CULTURE/COLONY COUNT: CPT | Performed by: PHYSICIAN ASSISTANT

## 2022-10-24 RX ORDER — NITROFURANTOIN 25; 75 MG/1; MG/1
100 CAPSULE ORAL 2 TIMES DAILY
Qty: 14 CAPSULE | Refills: 0 | Status: SHIPPED | OUTPATIENT
Start: 2022-10-24 | End: 2022-10-31

## 2022-10-24 NOTE — PROGRESS NOTES
(N30.00) Acute cystitis without hematuria  (primary encounter diagnosis)  Plan: nitroFURantoin macrocrystal-monohydrate         (MACROBID) 100 MG capsule    (R30.0) Dysuria  Plan: UA macro with reflex to Microscopic and Culture        - Clinc Collect, Urine Microscopic, Urine         Culture, nitroFURantoin         macrocrystal-monohydrate (MACROBID) 100 MG         capsule    Patient should drink 1.5-2 liters of water per day. Okay to use Azo over the counter and/or cranberry juice for symptomatic relief (Note that Azo will cause urine to become orange/red. If you are a contact lens-wearer, contacts may become discolored.This is normal). Okay to continue taking prescribed antibiotic for full course. Patient was advised to return to clinic if symptoms do not improve in the amount of time specified in the AVS or if symptoms worsen. Patient educated on red flag symptoms and asked to go directly to the ED if symptoms present themselves.      Patient given handout on how to prevent UTIs in the future.    Phoenix Freeman PA-C  [facility]    Subjective   57 year old who presents to clinic today for the following health issues:    Urgent Care and Dysuria       HPI     Genitourinary - Male  Onset/Duration: 3-4 days   Description:   Dysuria (painful urination): YES  Hematuria (blood in urine): No  Frequency: YES  Waking at night to urinate: YES  Hesitancy (delay in urine): No  Retention (unable to empty): No  Decrease in urinary flow: No  Incontinence: No  Progression of Symptoms:  worsening  Accompanying Signs & Symptoms:  Fever: Patient felt feverish but no measured fevers   Back/Flank pain: Some mild discomfort under the ribs on both sides   Urethral discharge: No  Testicle lumps/masses/pain: No  Nausea and/or vomiting: No  Abdominal pain: No  History:   History of frequent UTI s: YES- More recently   History of kidney stones: No  History of hernias: Yes- 30 years ago   Personal or Family history of  Prostate problems: No  Sexually active: YES- One partner with no current symptoms   Precipitating or alleviating factors: None  Therapies tried and outcome: none    Review of Systems   Review of Systems   See HPI    Objective    Temp: 97.9  F (36.6  C) Temp src: Temporal BP: 133/81 Pulse: 78     SpO2: 97 %       Physical Exam   Physical Exam  Constitutional:       General: He is not in acute distress.     Appearance: Normal appearance. He is normal weight. He is not ill-appearing, toxic-appearing or diaphoretic.   HENT:      Head: Normocephalic and atraumatic.   Cardiovascular:      Rate and Rhythm: Normal rate.      Pulses: Normal pulses.   Pulmonary:      Effort: Pulmonary effort is normal. No respiratory distress.   Abdominal:      Tenderness: There is no right CVA tenderness or left CVA tenderness.   Neurological:      Mental Status: He is alert.   Psychiatric:         Mood and Affect: Mood normal.         Behavior: Behavior normal.         Thought Content: Thought content normal.         Judgment: Judgment normal.          Results for orders placed or performed in visit on 10/24/22 (from the past 24 hour(s))   UA macro with reflex to Microscopic and Culture - Clinc Collect    Specimen: Urine, Midstream   Result Value Ref Range    Color Urine Yellow Colorless, Straw, Light Yellow, Yellow    Appearance Urine Clear Clear    Glucose Urine Negative Negative, 1000 , >=2000 mg/dL    Bilirubin Urine Negative Negative    Ketones Urine Negative Negative, 160  mg/dL    Specific Gravity Urine 1.015 1.003 - 1.035    Blood Urine Trace (A) Negative    pH Urine 7.5 (H) 5.0 - 7.0    Protein Albumin Urine Negative Negative, 300 , >=2000 mg/dL    Urobilinogen Urine 0.2 0.2, 1.0 E.U./dL    Nitrite Urine Negative Negative    Leukocyte Esterase Urine Small (A) Negative   Urine Microscopic   Result Value Ref Range    Bacteria Urine Few (A) None Seen /HPF    RBC Urine 2-5 (A) 0-2 /HPF /HPF    WBC Urine 10-25 (A) 0-5 /HPF /HPF

## 2022-10-27 ENCOUNTER — TELEPHONE (OUTPATIENT)
Dept: URGENT CARE | Facility: URGENT CARE | Age: 57
End: 2022-10-27

## 2022-10-27 LAB — BACTERIA UR CULT: ABNORMAL

## 2022-10-27 RX ORDER — AMOXICILLIN 500 MG/1
1000 CAPSULE ORAL 2 TIMES DAILY
Qty: 28 CAPSULE | Refills: 0 | Status: SHIPPED | OUTPATIENT
Start: 2022-10-27 | End: 2022-11-03

## 2022-10-27 NOTE — TELEPHONE ENCOUNTER
Spoke with patient, he is improving and if he doesn't continue to improve he will call back for a change in prescription per note below.  dann escalera MD   10/27/2022  3:50 PM CDT Back to Top      Please call if symptoms persisting would change antibiotic   Kathleen Cho MD

## 2022-11-19 ENCOUNTER — MYC MEDICAL ADVICE (OUTPATIENT)
Dept: FAMILY MEDICINE | Facility: CLINIC | Age: 57
End: 2022-11-19

## 2022-11-19 NOTE — TELEPHONE ENCOUNTER
JW,  Please see below [employer] message and advise.  Refill request is in alternate encounter.  Thanks,  Shawnee CLEMENTE RN

## 2022-11-21 DIAGNOSIS — N52.8 OTHER MALE ERECTILE DYSFUNCTION: ICD-10-CM

## 2022-11-21 DIAGNOSIS — I10 HYPERTENSION GOAL BP (BLOOD PRESSURE) < 140/90: ICD-10-CM

## 2022-11-21 DIAGNOSIS — E29.1 HYPOGONADISM MALE: ICD-10-CM

## 2022-11-21 DIAGNOSIS — E29.1 MALE HYPOGONADISM: ICD-10-CM

## 2022-11-21 DIAGNOSIS — E78.5 HYPERLIPIDEMIA LDL GOAL <130: ICD-10-CM

## 2022-11-21 DIAGNOSIS — F51.04 PSYCHOPHYSIOLOGIC INSOMNIA: ICD-10-CM

## 2022-11-21 DIAGNOSIS — R68.82 LOW LIBIDO: ICD-10-CM

## 2022-11-21 DIAGNOSIS — M80.00XA OSTEOPOROSIS WITH CURRENT PATHOLOGICAL FRACTURE, UNSPECIFIED OSTEOPOROSIS TYPE, INITIAL ENCOUNTER: ICD-10-CM

## 2022-11-21 DIAGNOSIS — M85.80 OSTEOPENIA, UNSPECIFIED LOCATION: ICD-10-CM

## 2022-11-21 RX ORDER — TESTOSTERONE CYPIONATE 200 MG/ML
50 INJECTION, SOLUTION INTRAMUSCULAR WEEKLY
Qty: 10 ML | Refills: 0 | Status: SHIPPED | OUTPATIENT
Start: 2022-11-21 | End: 2023-01-18

## 2022-11-21 RX ORDER — NEEDLES, DISPOSABLE 25GX5/8"
NEEDLE, DISPOSABLE MISCELLANEOUS
Qty: 12 EACH | Refills: 3 | Status: SHIPPED | OUTPATIENT
Start: 2022-11-21 | End: 2023-03-10

## 2022-11-21 NOTE — TELEPHONE ENCOUNTER
Androgen Agents Failed 11/21/2022 09:02 AM   Protocol Details  HCT less than 54% on file within past 12 mos    Serum PSA on file within past 12 mos    Refills for this classification require provider review        Previously with Dr Jimenez. RTC in place 02/23 with Dr Yung

## 2022-11-23 NOTE — TELEPHONE ENCOUNTER
RECORDS RECEIVED FROM: internal   DATE RECEIVED: 2.13.23    NOTES (FOR ALL VISITS) STATUS DETAILS   OFFICE NOTES from referring provider internal Wegener, Joel Daniel Irwin, MD   OFFICE NOTES from other specialist internal 6.29.22 Tony        MEDICATION LIST internal    IMAGING      DEXASCAN internal 7/5/21      LABS     DIABETES: HBGA1C, CREATININE, FASTING LIPIDS, MICROALBUMIN URINE, POTASSIUM, TSH, T4  THYROID: TSH, T4, CBC, THYRODLONULIN, TOTAL T3, FREE T4, CALCITONIN, CEA internal

## 2022-11-27 RX ORDER — SYRINGE WITH NEEDLE, 1 ML 25GX5/8"
SYRINGE, EMPTY DISPOSABLE MISCELLANEOUS
Qty: 12 EACH | Refills: 3 | OUTPATIENT
Start: 2022-11-27

## 2022-11-28 DIAGNOSIS — E34.9 HYPOTESTOSTERONISM: Primary | ICD-10-CM

## 2022-11-28 NOTE — TELEPHONE ENCOUNTER
"Previously with Dr Jimenez, RTC in place with Dr Yung.         RE    [facility] Call Center     Phone Message     May a detailed message be left on voicemail: no      Reason for Call: Medication Question or concern regarding medication   Prescription Clarification  Name of Medication: syringe/needle, disp, 23G X 1\" 3 ML MISC  Prescribing Provider: Hadley Burgos MD              Pharmacy: [place], [place] - [facility] [address]              What on the order needs clarification? This syringe in too big for the small amount of testosterone that needs to be drawn. They are requesting a new prescription for 1 mL size. Please follow up. Thank you.              Action Taken: Other: Endo     Travel Screening: Not Applicable                                                                                                                                                                                                                                                                                                                                                                                                                                                                                                                                                                                                                                                                                          Note      [place], [place] - [address] [phone]         "

## 2023-01-09 ENCOUNTER — E-VISIT (OUTPATIENT)
Dept: URGENT CARE | Facility: CLINIC | Age: 58
End: 2023-01-09
Payer: COMMERCIAL

## 2023-01-09 ENCOUNTER — OFFICE VISIT (OUTPATIENT)
Dept: URGENT CARE | Facility: URGENT CARE | Age: 58
End: 2023-01-09
Payer: COMMERCIAL

## 2023-01-09 VITALS
RESPIRATION RATE: 15 BRPM | WEIGHT: 170 LBS | HEIGHT: 69 IN | OXYGEN SATURATION: 96 % | SYSTOLIC BLOOD PRESSURE: 120 MMHG | TEMPERATURE: 97.3 F | BODY MASS INDEX: 25.18 KG/M2 | HEART RATE: 79 BPM | DIASTOLIC BLOOD PRESSURE: 86 MMHG

## 2023-01-09 DIAGNOSIS — R30.0 DYSURIA: Primary | ICD-10-CM

## 2023-01-09 LAB
ALBUMIN UR-MCNC: NEGATIVE MG/DL
AMORPH CRY #/AREA URNS HPF: ABNORMAL /HPF
APPEARANCE UR: ABNORMAL
BILIRUB UR QL STRIP: NEGATIVE
COLOR UR AUTO: YELLOW
GLUCOSE UR STRIP-MCNC: NEGATIVE MG/DL
HGB UR QL STRIP: NEGATIVE
KETONES UR STRIP-MCNC: NEGATIVE MG/DL
LEUKOCYTE ESTERASE UR QL STRIP: NEGATIVE
NITRATE UR QL: NEGATIVE
PH UR STRIP: 7 [PH] (ref 5–7)
RBC #/AREA URNS AUTO: ABNORMAL /HPF
SP GR UR STRIP: 1.02 (ref 1–1.03)
UROBILINOGEN UR STRIP-ACNC: 0.2 E.U./DL
WBC #/AREA URNS AUTO: ABNORMAL /HPF

## 2023-01-09 PROCEDURE — 81001 URINALYSIS AUTO W/SCOPE: CPT

## 2023-01-09 PROCEDURE — 99213 OFFICE O/P EST LOW 20 MIN: CPT | Performed by: PHYSICIAN ASSISTANT

## 2023-01-09 PROCEDURE — 99207 PR NON-BILLABLE SERV PER CHARTING: CPT | Performed by: FAMILY MEDICINE

## 2023-01-09 NOTE — PATIENT INSTRUCTIONS
Dear Fabrice Donahue,    We are sorry you are not feeling well. Based on the responses you provided, it is recommended that you be seen in-person in urgent care so we can better evaluate your symptoms. Please click here to find the nearest urgent care location to you.   You will not be charged for this Visit. Thank you for trusting us with your care.    Caprice Fountain MD

## 2023-01-09 NOTE — PROGRESS NOTES
Dysuria  - UA macro with reflex to Microscopic and Culture - Clinc Collect  - Urine Microscopic    Patient is possibly in the process of passing a urinary stone.  It does not appear to be a kidney stone as the patient is not having any flank pain.  I have asked the patient to push fluids and have a repeat UA in the next 1 to 2 weeks with primary care.    Patient was advised to return to clinic if symptoms do not improve in the amount of time specified in the AVS or if symptoms worsen. Patient educated on red flag symptoms and asked to go directly to the ED if symptoms present themselves.     SHERMAN Becerra [facility] URGENT [facility]ARE    Subjective   57 year old who presents to clinic today for the following health issues:    Urgent Care and UTI     HPI     Genitourinary - Male  Onset/Duration: Started 2 days ago, frequency and some pain, discoloration.   Description:   Dysuria (painful urination): YES}  Hematuria (blood in urine): No  Frequency: YES  Hesitancy (delay in urine): No  Retention (unable to empty): No  Decrease in urinary flow: No  Incontinence: No  Progression of Symptoms:  same  Accompanying Signs & Symptoms:  Fever: Patient has felt feverish but no measured fever    Back/Flank pain: No  Urethral discharge: No  Testicle lumps/masses/pain: No  Nausea and/or vomiting: No  Abdominal pain: No  History:   History of frequent UTI s: YES  History of kidney stones: No  Precipitating or alleviating factors: Increased stress lately.   Therapies tried and outcome: None     Review of Systems   Review of Systems   See HPI    Objective    Temp: 97.3  F (36.3  C) Temp src: Temporal BP: 120/86 Pulse: 79   Resp: 15 SpO2: 96 %       Physical Exam   Physical Exam  Constitutional:       General: He is not in acute distress.     Appearance: Normal appearance. He is normal weight. He is not ill-appearing, toxic-appearing or diaphoretic.   HENT:      Head: Normocephalic and atraumatic.   Cardiovascular:      Rate  and Rhythm: Normal rate.      Pulses: Normal pulses.   Pulmonary:      Effort: Pulmonary effort is normal. No respiratory distress.   Neurological:      Mental Status: He is alert.   Psychiatric:         Mood and Affect: Mood normal.         Behavior: Behavior normal.         Thought Content: Thought content normal.         Judgment: Judgment normal.          Results for orders placed or performed in visit on 01/09/23 (from the past 24 hour(s))   UA macro with reflex to Microscopic and Culture - Clinc Collect    Specimen: Urine, Midstream   Result Value Ref Range    Color Urine Yellow Colorless, Straw, Light Yellow, Yellow    Appearance Urine Cloudy (A) Clear    Glucose Urine Negative Negative mg/dL    Bilirubin Urine Negative Negative    Ketones Urine Negative Negative mg/dL    Specific Gravity Urine 1.020 1.003 - 1.035    Blood Urine Negative Negative    pH Urine 7.0 5.0 - 7.0    Protein Albumin Urine Negative Negative mg/dL    Urobilinogen Urine 0.2 0.2, 1.0 E.U./dL    Nitrite Urine Negative Negative    Leukocyte Esterase Urine Negative Negative   Urine Microscopic   Result Value Ref Range    RBC Urine None Seen 0-2 /HPF /HPF    WBC Urine None Seen 0-5 /HPF /HPF    Amorphous Crystals Urine Many (A) None Seen /HPF    Narrative    Urine Culture not indicated

## 2023-02-11 ASSESSMENT — ENCOUNTER SYMPTOMS
SYNCOPE: 0
EXERCISE INTOLERANCE: 1
LIGHT-HEADEDNESS: 0
PALPITATIONS: 0
DIARRHEA: 1
BLOATING: 0
JAUNDICE: 0
LEG PAIN: 0
VOMITING: 0
CONSTIPATION: 0
RECTAL PAIN: 0
NAUSEA: 0
ORTHOPNEA: 0
SLEEP DISTURBANCES DUE TO BREATHING: 0
HYPERTENSION: 0
HEARTBURN: 1
BLOOD IN STOOL: 0
HYPOTENSION: 0
BOWEL INCONTINENCE: 0
ABDOMINAL PAIN: 1

## 2023-02-13 ENCOUNTER — VIRTUAL VISIT (OUTPATIENT)
Dept: ENDOCRINOLOGY | Facility: CLINIC | Age: 58
End: 2023-02-13
Payer: COMMERCIAL

## 2023-02-13 ENCOUNTER — PRE VISIT (OUTPATIENT)
Dept: ENDOCRINOLOGY | Facility: CLINIC | Age: 58
End: 2023-02-13

## 2023-02-13 DIAGNOSIS — R68.82 LOW LIBIDO: Primary | ICD-10-CM

## 2023-02-13 DIAGNOSIS — M85.852 OSTEOPENIA OF BOTH HIPS: ICD-10-CM

## 2023-02-13 DIAGNOSIS — N52.9 ERECTILE DYSFUNCTION, UNSPECIFIED ERECTILE DYSFUNCTION TYPE: ICD-10-CM

## 2023-02-13 DIAGNOSIS — R79.89 LOW TESTOSTERONE: ICD-10-CM

## 2023-02-13 DIAGNOSIS — R79.89 HIGH SERUM INSULIN-LIKE GROWTH FACTOR 1 (IGF-1): ICD-10-CM

## 2023-02-13 DIAGNOSIS — M85.851 OSTEOPENIA OF BOTH HIPS: ICD-10-CM

## 2023-02-13 PROCEDURE — 99215 OFFICE O/P EST HI 40 MIN: CPT | Mod: VID | Performed by: INTERNAL MEDICINE

## 2023-02-13 RX ORDER — TESTOSTERONE CYPIONATE 1000 MG/10ML
50 INJECTION, SOLUTION INTRAMUSCULAR
Qty: 12 ML | Refills: 3 | Status: SHIPPED | OUTPATIENT
Start: 2023-02-13 | End: 2023-03-10

## 2023-02-13 NOTE — NURSING NOTE
Is the patient currently in the state of [place]? NO    Visit mode:VIDEO    If the visit is dropped, the patient can be reconnected by: VIDEO VISIT: Text to cell phone: [phone]    Will anyone else be joining the visit? NO      How would you like to obtain your AVS? MyChart    Are changes needed to the allergy or medication list? NO    Comments or concerns regarding today's visit:

## 2023-02-13 NOTE — LETTER
2/13/2023       RE: Fabrice Donahue  [address] [address]     Dear Colleague,    Thank you for referring your patient, Fabrice Donahue, to the [facility] [place] [facility]. Please see a copy of my visit note below.      Video-Visit Details    Type of service:  Video Visit    Video Start Time (time video started): 8:50 am   Video End Time (time video stopped): 9:24 am     Originating Location (pt. Location): Home  Distant Location (provider location): Off-site    Mode of Communication:  Video Conference via [employer]    The patient was previously seen by Dr. Jimenez, most recently in June 2022.  This is his first visit with me.      Fabrice is a 57 year old male who was initially evaluated in 2020 for erectile dysfunction, low libido (with a gradual decline but absent for 2 years), in the context of a low normal testosterone level.  Treatment with sildenafil at 20 or 40 mg was not helpful.  The patient has 4 kids and no prior history of fertility issues.     In July 2022, the decision was to start treatment with IM testosterone, at 50 mg weekly.  It was thought treatment with testosterone might also help with the bone mineral density. Prior to starting treatment with testosterone, on 7/1/2022, PSA was 0.48, morning total testosterone was 311 with a free testosterone of 7.01, the sex hormone binding globulin and LH were normal.    The patient reports being compliant in administering testosterone as instructed.  He questions whether or not he would benefit from a higher dose. Since being on treatment with testosterone, he reports better energy, improved libido, better motivation and improved erectile dysfunction.  He has been feeling stronger while doing ultra distance cycling.  He bikes 4-5 times a week, for at least 1 hour. Denies skin reactions at the injection site.  He does have a history of an enlarged prostate  diagnosed 2 to 3 years ago which manifests symptomatically with more frequent urination, now 1 times a night.  This has not changed since taking testosterone.  Denies new urinary symptoms.  He also denies increased facial hair, breast enlargement.  There is no prior history of hot flashes.    In the last month he has been experiencing looser stools and he thinks this may be related to a new sport calorie drink containing carbohydrates, which he has been having recently.  On questioning, he denies tremor, palpitations, increased sweating, enlargement of the hands.  His shoe size increased by half a size in the last 2 years. No h/o overbite.     According to the patient, the bone densitometry scan was pursued as his dentist noticed some demineralization of the jaw bone.  At the end of March 2022, he was diagnosed with 2 compression fractures at T11 and T12.  The fractures occurred after falling down the stairs on his back, with significant impact.  The patient denies residual back pain.  There is no prior history of other fractures, no family history of hip fractures, osteoporosis or kidney stones.    Daily intake of dairy products consists of milk with breakfast and occasional yogurt or cheese.  In general, he thinks he has two servings of dairy products daily.    The DEXA scan from July 2021 revealed osteopenia, with the lowest T score of -1.8 at the right femoral neck.  L1-L4 T score was -0.9, left femoral neck T score was -1.5.  I reviewed the DEXA scan images.    Current calcium and vitamin D supplements:  400 mg calcium 500 units vitamin D twice daily  500 units of vitamin D as a separate supplement  He does take a multivitamin which does not contain vitamin D    Calcium has been high normal on prior lab work.  Vitamin D was 59 on lab work from 7/1/2022.    I reviewed prior lab results:     Latest Reference Range & Units 01/02/13 08:46 03/14/16 08:31 05/03/16 07:35 07/07/20 10:51 07/23/20 09:58 08/14/20 07:30  "07/12/21 07:51 07/01/22 08:47   Luteinizing Hormone 1.7 - 8.6 mIU/mL        4.0   Testosterone Total 240 - 950 ng/dL 257 252 349 244 303 344 291 311   Testosterone Free 8 - 30 ng/dL 8.0          FSH 0.7 - 10.8 IU/L      5.2     Lutropin 1.5 - 9.3 IU/L      4.0     Percent Testosterone Free 2.0 - 4.0 % 3.1          Sex Hormone Binding Globulin 11 - 80 nmol/L  24 21 29 30 33 30 26   Free Testosterone Calculated Ng/dL (4.1-23.9)  5.80 8.82 5.07 6.30 6.86 6.03 7.01      Latest Reference Range & Units 08/14/20 07:30 07/01/22 08:47   Cortisol Serum ug/dL 12.0 10.2   Prolactin 2 - 18 ug/L 12    Prolactin 4 - 15 ng/mL  7   PSA 0.00 - 4.00 ug/L 0.51 0.48   T4 Free 0.76 - 1.46 ng/dL 1.07 1.05   TSH 0.40 - 4.00 mU/L 3.03 2.32   Ins Growth Factor 1 56 - 203 ng/mL  226 (H)       Past Medical History:   Diagnosis Date     Benign neoplasm of skin of other and unspecified parts of face     atypical nevus     Essential hypertension, benign      Lyme disease 8/2007    hot welts, bull eye bruise     Mixed hyperlipidemia      Other chronic nonalcoholic liver disease 2006    Fatty liver     Shoulder pain, right     post clavicular fx     Syncope 7/08    neg Head/Neck CT     Unspecified hemorrhoids without mention of complication    Degenerative disc disease of the cervical spine  Colon polyp     Past Surgical History:   Procedure Laterality Date     COLONOSCOPY  04/2014     COLONOSCOPY N/A 8/26/2019    Procedure: COLONOSCOPY, WITH POLYPECTOMY;  Surgeon: José Baig MD;  Location: [facility] OR      US ABDOMEN COMPLETE  6/2006    fatty liver     HERNIA REPAIR  1984     SURGICAL HISTORY OF -   1982    L Inguinal Hernia     SURGICAL HISTORY OF -   8/09    R Clavicle Fracture Repair     ZZC ECHO HEART XTHORACIC,COMPLETE, W/O DOPPLER  8/08    Nl       Current Outpatient Medications   Medication     BD DISP NEEDLES 18G X 1-1/2\" MISC     calcitonin, salmon, (MIACALCIN) 200 UNIT/ACT nasal spray     lisinopril (ZESTRIL) 10 MG tablet     Multiple " "Vitamin (MULTI-VITAMIN) per tablet     Needle, Disp, (B-D DISP NEEDLE) 25G X 1\" MISC     pravastatin (PRAVACHOL) 20 MG tablet     sildenafil (REVATIO) 20 MG tablet     syringe/needle, disp, 23G X 1\" 3 ML MISC     syringe/needle, sisp, 25G X 5/8\" 1 ML MISC     testosterone cypionate (DEPOTESTOSTERONE) 200 MG/ML injection     No current facility-administered medications for this visit.       Family History   Problem Relation Age of Onset     Hypertension Mother      Cancer Mother         kidney     Lipids Mother         high     Thyroid Disease Mother         hyper     Hyperlipidemia Mother      Respiratory Father         emphysema     Hypertension Maternal Grandmother      Cerebrovascular Disease Maternal Grandmother      Lipids Sister         high     Hypertension Sister      Lipids Brother         high     Hypertension Brother      Diabetes Maternal Aunt         type II     Hypertension Sister        Social History: . 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  He denies smoking, drinking alcohol or using illicit drugs.    Exam  Wt Readings from Last 10 Encounters:   01/09/23 77.1 kg (170 lb)   10/24/22 74.4 kg (164 lb)   07/11/22 76.1 kg (167 lb 12.8 oz)   04/09/22 73 kg (161 lb)   08/19/21 73 kg (161 lb)   07/12/21 73.1 kg (161 lb 3.2 oz)   05/14/21 75.3 kg (165 lb 14.4 oz)   04/22/21 78 kg (172 lb)   09/15/20 78 kg (172 lb)   07/07/20 79.8 kg (176 lb)     Estimated body mass index is 25.1 kg/m  as calculated from the following:    Height as of 1/9/23: 1.753 m (5' 9\").    Weight as of 1/9/23: 77.1 kg (170 lb).    GEN: Healthy, alert and no distress  HEENT: EOMI  RESP: No audible wheeze, cough, or visible cyanosis  SKIN: Visible skin clear  NEURO: Cranial nerves grossly intact. Mentation and speech appropriate for age  PSYCH: Mentation appears normal, affect normal/bright, judgement and insight intact, normal speech and appearance well-groomed     Assessment and " Plan    1) Lowish testosterone levels  The patient does not meet the criteria for hypogonadotropic hypogonadism.  However, it appears that his symptoms of low libido and erectile dysfunction significantly improved since being on treatment with testosterone.  The testosterone should also help improve the bone mineral density.  Plan:  Check LH, FSH, total and free testosterone, CBC, PSA and liver enzymes  Consider adjusting the dose of testosterone based on the lab results.    2) Erectile dysfunction  He still has sildenafil tablets at home.  I recommended to consider increasing the dose up to 80 mg as needed.    3) Osteopenia at both hips, w/ ho provoked vertebral compression fractures, on the DEXA scan from July 2021.   Recommendations:   Continue to take the same dose of calcium and vitamin D  Follow-up calcium, GFR and vitamin D levels  Consider weightbearing exercises  Schedule a follow-up DEXA scan in July 2024, after 2 years of treatment with testosterone, to evaluate the effect of testosterone on the bone mineral density.    4) High IGF-1 on prior lab work  The patient does endorse a recent history of having to change the shoe size but no other signs or symptoms suggestive of acromegaly.  Plan:  Recheck IGF-I level    Orders Placed This Encounter   Procedures     Luteinizing Hormone     Follicle stimulating hormone     PSA tumor marker     AST     ALT     Insulin Growth Factor 1 by Immunoassay     25 Hydroxyvitamin D2 and D3     Basic metabolic panel     Albumin level     Testosterone Free and Total     CBC with Platelets & Differential       Answers for HPI/ROS submitted by the patient on 2/11/2023  General Symptoms: No  Skin Symptoms: No  HENT Symptoms: No  EYE SYMPTOMS: No  HEART SYMPTOMS: Yes  LUNG SYMPTOMS: No  INTESTINAL SYMPTOMS: Yes  URINARY SYMPTOMS: No  REPRODUCTIVE SYMPTOMS: No  SKELETAL SYMPTOMS: No  BLOOD SYMPTOMS: No  NERVOUS SYSTEM SYMPTOMS: No  MENTAL HEALTH SYMPTOMS: No  Chest pain or  pressure: No  Fast or irregular heartbeat: No  Pain in legs with walking: No  Trouble breathing while lying down: No  Fingers or toes appear blue: No  High blood pressure: No  Low blood pressure: No  Fainting: No  Murmurs: No  Pacemaker: No  Varicose veins: No  Edema or swelling: No  Wake up at night with shortness of breath: No  Light-headedness: No  Exercise intolerance: Yes  Heart burn or indigestion: Yes  Nausea: No  Vomiting: No  Abdominal pain: Yes  Bloating: No  Constipation: No  Diarrhea: Yes  Blood in stool: No  Black stools: No  Rectal or Anal pain: No  Fecal incontinence: No  Yellowing of skin or eyes: No  Vomit with blood: No  Change in stools: Yes    43 minutes spent on the date of the encounter doing chart review, history and exam, documentation and further activities as noted above.      Lila Yung MD

## 2023-02-13 NOTE — PROGRESS NOTES
Video-Visit Details    Type of service:  Video Visit    Video Start Time (time video started): 8:50 am   Video End Time (time video stopped): 9:24 am     Originating Location (pt. Location): Home  Distant Location (provider location): Off-site    Mode of Communication:  Video Conference via [employer]    The patient was previously seen by Dr. Jimenez, most recently in June 2022.  This is his first visit with me.      Fabrice is a 57 year old male who was initially evaluated in 2020 for erectile dysfunction, low libido (with a gradual decline but absent for 2 years), in the context of a low normal testosterone level.  Treatment with sildenafil at 20 or 40 mg was not helpful.  The patient has 4 kids and no prior history of fertility issues.     In July 2022, the decision was to start treatment with IM testosterone, at 50 mg weekly.  It was thought treatment with testosterone might also help with the bone mineral density. Prior to starting treatment with testosterone, on 7/1/2022, PSA was 0.48, morning total testosterone was 311 with a free testosterone of 7.01, the sex hormone binding globulin and LH were normal.    The patient reports being compliant in administering testosterone as instructed.  He questions whether or not he would benefit from a higher dose. Since being on treatment with testosterone, he reports better energy, improved libido, better motivation and improved erectile dysfunction.  He has been feeling stronger while doing ultra distance cycling.  He bikes 4-5 times a week, for at least 1 hour. Denies skin reactions at the injection site.  He does have a history of an enlarged prostate diagnosed 2 to 3 years ago which manifests symptomatically with more frequent urination, now 1 times a night.  This has not changed since taking testosterone.  Denies new urinary symptoms.  He also denies increased facial hair, breast enlargement.  There is no prior history of hot flashes.    In the last month he has  been experiencing looser stools and he thinks this may be related to a new sport calorie drink containing carbohydrates, which he has been having recently.  On questioning, he denies tremor, palpitations, increased sweating, enlargement of the hands.  His shoe size increased by half a size in the last 2 years. No h/o overbite.     According to the patient, the bone densitometry scan was pursued as his dentist noticed some demineralization of the jaw bone.  At the end of March 2022, he was diagnosed with 2 compression fractures at T11 and T12.  The fractures occurred after falling down the stairs on his back, with significant impact.  The patient denies residual back pain.  There is no prior history of other fractures, no family history of hip fractures, osteoporosis or kidney stones.    Daily intake of dairy products consists of milk with breakfast and occasional yogurt or cheese.  In general, he thinks he has two servings of dairy products daily.    The DEXA scan from July 2021 revealed osteopenia, with the lowest T score of -1.8 at the right femoral neck.  L1-L4 T score was -0.9, left femoral neck T score was -1.5.  I reviewed the DEXA scan images.    Current calcium and vitamin D supplements:  400 mg calcium 500 units vitamin D twice daily  500 units of vitamin D as a separate supplement  He does take a multivitamin which does not contain vitamin D    Calcium has been high normal on prior lab work.  Vitamin D was 59 on lab work from 7/1/2022.    I reviewed prior lab results:     Latest Reference Range & Units 01/02/13 08:46 03/14/16 08:31 05/03/16 07:35 07/07/20 10:51 07/23/20 09:58 08/14/20 07:30 07/12/21 07:51 07/01/22 08:47   Luteinizing Hormone 1.7 - 8.6 mIU/mL        4.0   Testosterone Total 240 - 950 ng/dL 257 252 349 244 303 344 291 311   Testosterone Free 8 - 30 ng/dL 8.0          FSH 0.7 - 10.8 IU/L      5.2     Lutropin 1.5 - 9.3 IU/L      4.0     Percent Testosterone Free 2.0 - 4.0 % 3.1          Sex  "Hormone Binding Globulin 11 - 80 nmol/L  24 21 29 30 33 30 26   Free Testosterone Calculated Ng/dL (4.1-23.9)  5.80 8.82 5.07 6.30 6.86 6.03 7.01      Latest Reference Range & Units 08/14/20 07:30 07/01/22 08:47   Cortisol Serum ug/dL 12.0 10.2   Prolactin 2 - 18 ug/L 12    Prolactin 4 - 15 ng/mL  7   PSA 0.00 - 4.00 ug/L 0.51 0.48   T4 Free 0.76 - 1.46 ng/dL 1.07 1.05   TSH 0.40 - 4.00 mU/L 3.03 2.32   Ins Growth Factor 1 56 - 203 ng/mL  226 (H)       Past Medical History:   Diagnosis Date     Benign neoplasm of skin of other and unspecified parts of face     atypical nevus     Essential hypertension, benign      Lyme disease 8/2007    hot welts, bull eye bruise     Mixed hyperlipidemia      Other chronic nonalcoholic liver disease 2006    Fatty liver     Shoulder pain, right     post clavicular fx     Syncope 7/08    neg Head/Neck CT     Unspecified hemorrhoids without mention of complication    Degenerative disc disease of the cervical spine  Colon polyp     Past Surgical History:   Procedure Laterality Date     COLONOSCOPY  04/2014     COLONOSCOPY N/A 8/26/2019    Procedure: COLONOSCOPY, WITH POLYPECTOMY;  Surgeon: José Baig MD;  Location:  OR      US ABDOMEN COMPLETE  6/2006    fatty liver     HERNIA REPAIR  1984     SURGICAL HISTORY OF -   1982    L Inguinal Hernia     SURGICAL HISTORY OF -   8/09    R Clavicle Fracture Repair     ZZC ECHO HEART XTHORACIC,COMPLETE, W/O DOPPLER  8/08    Nl       Current Outpatient Medications   Medication     BD DISP NEEDLES 18G X 1-1/2\" MISC     calcitonin, salmon, (MIACALCIN) 200 UNIT/ACT nasal spray     lisinopril (ZESTRIL) 10 MG tablet     Multiple Vitamin (MULTI-VITAMIN) per tablet     Needle, Disp, (B-D DISP NEEDLE) 25G X 1\" MISC     pravastatin (PRAVACHOL) 20 MG tablet     sildenafil (REVATIO) 20 MG tablet     syringe/needle, disp, 23G X 1\" 3 ML MISC     syringe/needle, sisp, 25G X 5/8\" 1 ML MISC     testosterone cypionate (DEPOTESTOSTERONE) 200 MG/ML injection " "    No current facility-administered medications for this visit.       Family History   Problem Relation Age of Onset     Hypertension Mother      Cancer Mother         kidney     Lipids Mother         high     Thyroid Disease Mother         hyper     Hyperlipidemia Mother      Respiratory Father         emphysema     Hypertension Maternal Grandmother      Cerebrovascular Disease Maternal Grandmother      Lipids Sister         high     Hypertension Sister      Lipids Brother         high     Hypertension Brother      Diabetes Maternal Aunt         type II     Hypertension Sister        Social History: . 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  He denies smoking, drinking alcohol or using illicit drugs.    Exam  Wt Readings from Last 10 Encounters:   01/09/23 77.1 kg (170 lb)   10/24/22 74.4 kg (164 lb)   07/11/22 76.1 kg (167 lb 12.8 oz)   04/09/22 73 kg (161 lb)   08/19/21 73 kg (161 lb)   07/12/21 73.1 kg (161 lb 3.2 oz)   05/14/21 75.3 kg (165 lb 14.4 oz)   04/22/21 78 kg (172 lb)   09/15/20 78 kg (172 lb)   07/07/20 79.8 kg (176 lb)     Estimated body mass index is 25.1 kg/m  as calculated from the following:    Height as of 1/9/23: 1.753 m (5' 9\").    Weight as of 1/9/23: 77.1 kg (170 lb).    GEN: Healthy, alert and no distress  HEENT: EOMI  RESP: No audible wheeze, cough, or visible cyanosis  SKIN: Visible skin clear  NEURO: Cranial nerves grossly intact. Mentation and speech appropriate for age  PSYCH: Mentation appears normal, affect normal/bright, judgement and insight intact, normal speech and appearance well-groomed     Assessment and Plan    1) Lowish testosterone levels  The patient does not meet the criteria for hypogonadotropic hypogonadism.  However, it appears that his symptoms of low libido and erectile dysfunction significantly improved since being on treatment with testosterone.  The testosterone should also help improve the bone mineral " density.  Plan:  Check LH, FSH, total and free testosterone, CBC, PSA and liver enzymes  Consider adjusting the dose of testosterone based on the lab results.    2) Erectile dysfunction  He still has sildenafil tablets at home.  I recommended to consider increasing the dose up to 80 mg as needed.    3) Osteopenia at both hips, w/ ho provoked vertebral compression fractures, on the DEXA scan from July 2021.   Recommendations:   Continue to take the same dose of calcium and vitamin D  Follow-up calcium, GFR and vitamin D levels  Consider weightbearing exercises  Schedule a follow-up DEXA scan in July 2024, after 2 years of treatment with testosterone, to evaluate the effect of testosterone on the bone mineral density.    4) High IGF-1 on prior lab work  The patient does endorse a recent history of having to change the shoe size but no other signs or symptoms suggestive of acromegaly.  Plan:  Recheck IGF-I level    Orders Placed This Encounter   Procedures     Luteinizing Hormone     Follicle stimulating hormone     PSA tumor marker     AST     ALT     Insulin Growth Factor 1 by Immunoassay     25 Hydroxyvitamin D2 and D3     Basic metabolic panel     Albumin level     Testosterone Free and Total     CBC with Platelets & Differential       Answers for HPI/ROS submitted by the patient on 2/11/2023  General Symptoms: No  Skin Symptoms: No  HENT Symptoms: No  EYE SYMPTOMS: No  HEART SYMPTOMS: Yes  LUNG SYMPTOMS: No  INTESTINAL SYMPTOMS: Yes  URINARY SYMPTOMS: No  REPRODUCTIVE SYMPTOMS: No  SKELETAL SYMPTOMS: No  BLOOD SYMPTOMS: No  NERVOUS SYSTEM SYMPTOMS: No  MENTAL HEALTH SYMPTOMS: No  Chest pain or pressure: No  Fast or irregular heartbeat: No  Pain in legs with walking: No  Trouble breathing while lying down: No  Fingers or toes appear blue: No  High blood pressure: No  Low blood pressure: No  Fainting: No  Murmurs: No  Pacemaker: No  Varicose veins: No  Edema or swelling: No  Wake up at night with shortness of  breath: No  Light-headedness: No  Exercise intolerance: Yes  Heart burn or indigestion: Yes  Nausea: No  Vomiting: No  Abdominal pain: Yes  Bloating: No  Constipation: No  Diarrhea: Yes  Blood in stool: No  Black stools: No  Rectal or Anal pain: No  Fecal incontinence: No  Yellowing of skin or eyes: No  Vomit with blood: No  Change in stools: Yes    43 minutes spent on the date of the encounter doing chart review, history and exam, documentation and further activities as noted above.

## 2023-02-15 ENCOUNTER — LAB (OUTPATIENT)
Dept: LAB | Facility: CLINIC | Age: 58
End: 2023-02-15
Payer: COMMERCIAL

## 2023-02-15 DIAGNOSIS — M85.852 OSTEOPENIA OF BOTH HIPS: ICD-10-CM

## 2023-02-15 DIAGNOSIS — R79.89 HIGH SERUM INSULIN-LIKE GROWTH FACTOR 1 (IGF-1): ICD-10-CM

## 2023-02-15 DIAGNOSIS — M85.851 OSTEOPENIA OF BOTH HIPS: ICD-10-CM

## 2023-02-15 DIAGNOSIS — R79.89 LOW TESTOSTERONE: ICD-10-CM

## 2023-02-15 LAB
ALBUMIN SERPL BCG-MCNC: 4.6 G/DL (ref 3.5–5.2)
ALT SERPL W P-5'-P-CCNC: 15 U/L (ref 10–50)
ANION GAP SERPL CALCULATED.3IONS-SCNC: 13 MMOL/L (ref 7–15)
AST SERPL W P-5'-P-CCNC: 28 U/L (ref 10–50)
BASOPHILS # BLD AUTO: 0.2 10E3/UL (ref 0–0.2)
BASOPHILS NFR BLD AUTO: 1 %
BUN SERPL-MCNC: 15.9 MG/DL (ref 8–23)
CALCIUM SERPL-MCNC: 9.4 MG/DL (ref 8.6–10)
CHLORIDE SERPL-SCNC: 102 MMOL/L (ref 98–107)
CREAT SERPL-MCNC: 0.84 MG/DL (ref 0.67–1.17)
DEPRECATED HCO3 PLAS-SCNC: 23 MMOL/L (ref 22–29)
EOSINOPHIL # BLD AUTO: 0.6 10E3/UL (ref 0–0.7)
EOSINOPHIL NFR BLD AUTO: 4 %
ERYTHROCYTE [DISTWIDTH] IN BLOOD BY AUTOMATED COUNT: 12.7 % (ref 10–15)
FSH SERPL IRP2-ACNC: <0.3 MIU/ML (ref 1.5–12.4)
GFR SERPL CREATININE-BSD FRML MDRD: >90 ML/MIN/1.73M2
GLUCOSE SERPL-MCNC: 97 MG/DL (ref 70–99)
HCT VFR BLD AUTO: 47.8 % (ref 40–53)
HGB BLD-MCNC: 16 G/DL (ref 13.3–17.7)
IMM GRANULOCYTES # BLD: 0 10E3/UL
IMM GRANULOCYTES NFR BLD: 0 %
LH SERPL-ACNC: <0.3 MIU/ML (ref 1.7–8.6)
LYMPHOCYTES # BLD AUTO: 6.8 10E3/UL (ref 0.8–5.3)
LYMPHOCYTES NFR BLD AUTO: 53 %
MCH RBC QN AUTO: 30.4 PG (ref 26.5–33)
MCHC RBC AUTO-ENTMCNC: 33.5 G/DL (ref 31.5–36.5)
MCV RBC AUTO: 91 FL (ref 78–100)
MONOCYTES # BLD AUTO: 1.1 10E3/UL (ref 0–1.3)
MONOCYTES NFR BLD AUTO: 9 %
NEUTROPHILS # BLD AUTO: 4.2 10E3/UL (ref 1.6–8.3)
NEUTROPHILS NFR BLD AUTO: 33 %
NRBC # BLD AUTO: 0 10E3/UL
NRBC BLD AUTO-RTO: 0 /100
PLAT MORPH BLD: NORMAL
PLATELET # BLD AUTO: 277 10E3/UL (ref 150–450)
POTASSIUM SERPL-SCNC: 4.2 MMOL/L (ref 3.4–5.3)
PSA SERPL-MCNC: 0.77 NG/ML (ref 0–3.5)
RBC # BLD AUTO: 5.27 10E6/UL (ref 4.4–5.9)
RBC MORPH BLD: NORMAL
SODIUM SERPL-SCNC: 138 MMOL/L (ref 136–145)
WBC # BLD AUTO: 12.9 10E3/UL (ref 4–11)

## 2023-02-15 PROCEDURE — 80048 BASIC METABOLIC PNL TOTAL CA: CPT

## 2023-02-15 PROCEDURE — 36415 COLL VENOUS BLD VENIPUNCTURE: CPT

## 2023-02-15 PROCEDURE — 84460 ALANINE AMINO (ALT) (SGPT): CPT

## 2023-02-15 PROCEDURE — 84305 ASSAY OF SOMATOMEDIN: CPT

## 2023-02-15 PROCEDURE — 82306 VITAMIN D 25 HYDROXY: CPT

## 2023-02-15 PROCEDURE — 85025 COMPLETE CBC W/AUTO DIFF WBC: CPT

## 2023-02-15 PROCEDURE — 84270 ASSAY OF SEX HORMONE GLOBUL: CPT

## 2023-02-15 PROCEDURE — 84403 ASSAY OF TOTAL TESTOSTERONE: CPT

## 2023-02-15 PROCEDURE — 82040 ASSAY OF SERUM ALBUMIN: CPT

## 2023-02-15 PROCEDURE — 84153 ASSAY OF PSA TOTAL: CPT

## 2023-02-15 PROCEDURE — 84450 TRANSFERASE (AST) (SGOT): CPT

## 2023-02-15 PROCEDURE — 83001 ASSAY OF GONADOTROPIN (FSH): CPT

## 2023-02-15 PROCEDURE — 83002 ASSAY OF GONADOTROPIN (LH): CPT

## 2023-02-16 LAB — SHBG SERPL-SCNC: 19 NMOL/L (ref 11–80)

## 2023-02-17 LAB
IGF-I BLD-MCNC: 240 NG/ML (ref 55–203)
TESTOST FREE SERPL-MCNC: 10.14 NG/DL
TESTOST SERPL-MCNC: 380 NG/DL (ref 240–950)

## 2023-02-20 PROBLEM — R79.89: Status: ACTIVE | Noted: 2023-02-20

## 2023-02-21 ENCOUNTER — TELEPHONE (OUTPATIENT)
Dept: ENDOCRINOLOGY | Facility: CLINIC | Age: 58
End: 2023-02-21

## 2023-02-21 ENCOUNTER — MYC MEDICAL ADVICE (OUTPATIENT)
Dept: FAMILY MEDICINE | Facility: CLINIC | Age: 58
End: 2023-02-21
Payer: COMMERCIAL

## 2023-02-21 NOTE — TELEPHONE ENCOUNTER
JOHN and sent [facility] for pt to call infusion scheduling ([phone], option 3 then option 2) to schedule Oral Glucose Tolerance Test ordered by Dr. Yung.  Fco Branch on 2/21/2023 at 3:05 PM

## 2023-02-21 NOTE — TELEPHONE ENCOUNTER
----- Message from Geena Kaye, RN sent at 2/21/2023 12:30 PM CST -----  You have to  go to OTHER ORDERS  then click on Blue INFUSION its done in the infusion center   ----- Message -----  From: Dariela Hussein  Sent: 2/21/2023  12:26 PM CST  To: [facility] End[facility] Triage-[facility]!!!!     Dr. Yung sent this message and asked that we schedule but I am not locating an active order for an Oral Glucose Tolerance Test. Are you able to place this order and I can get it scheduled? Please advise when able.     Lila Yung MD  [id number]  Please schedule patient for an oral glucose tolerance test.       Thank you,   Draiela

## 2023-02-22 NOTE — TELEPHONE ENCOUNTER
JW,   Please see [employer] message  Next opening Friday 3/31   Okay to wait until?     Thanks,     Conner GOMEZ RN

## 2023-03-06 ENCOUNTER — LAB (OUTPATIENT)
Dept: LAB | Facility: CLINIC | Age: 58
End: 2023-03-06
Attending: INTERNAL MEDICINE
Payer: COMMERCIAL

## 2023-03-06 ENCOUNTER — INFUSION THERAPY VISIT (OUTPATIENT)
Dept: INFUSION THERAPY | Facility: CLINIC | Age: 58
End: 2023-03-06
Attending: INTERNAL MEDICINE
Payer: COMMERCIAL

## 2023-03-06 VITALS
HEART RATE: 68 BPM | TEMPERATURE: 98.4 F | BODY MASS INDEX: 26.06 KG/M2 | SYSTOLIC BLOOD PRESSURE: 150 MMHG | DIASTOLIC BLOOD PRESSURE: 90 MMHG | WEIGHT: 176.5 LBS | OXYGEN SATURATION: 96 % | RESPIRATION RATE: 16 BRPM

## 2023-03-06 DIAGNOSIS — R79.89 HIGH SERUM INSULIN-LIKE GROWTH FACTOR 1 (IGF-1): Primary | ICD-10-CM

## 2023-03-06 LAB
GLUCOSE BLDC GLUCOMTR-MCNC: 144 MG/DL (ref 70–99)
GLUCOSE SERPL-MCNC: 149 MG/DL (ref 70–99)
GLUCOSE SERPL-MCNC: 156 MG/DL (ref 70–99)
GLUCOSE SERPL-MCNC: 158 MG/DL (ref 70–99)
GLUCOSE SERPL-MCNC: 179 MG/DL (ref 70–99)
GLUCOSE SERPL-MCNC: 99 MG/DL (ref 70–99)

## 2023-03-06 PROCEDURE — 36415 COLL VENOUS BLD VENIPUNCTURE: CPT | Performed by: INTERNAL MEDICINE

## 2023-03-06 PROCEDURE — 83003 ASSAY GROWTH HORMONE (HGH): CPT | Performed by: INTERNAL MEDICINE

## 2023-03-06 PROCEDURE — 82962 GLUCOSE BLOOD TEST: CPT

## 2023-03-06 PROCEDURE — 82947 ASSAY GLUCOSE BLOOD QUANT: CPT | Mod: 91 | Performed by: INTERNAL MEDICINE

## 2023-03-06 PROCEDURE — 250N000009 HC RX 250: Performed by: INTERNAL MEDICINE

## 2023-03-06 RX ADMIN — ALCOHOL 75 G: 65 GEL TOPICAL at 09:55

## 2023-03-06 ASSESSMENT — PAIN SCALES - GENERAL: PAINLEVEL: NO PAIN (0)

## 2023-03-06 NOTE — LETTER
Date:March 7, 2023      Provider requested that no letter be sent. Do not send.       [facility]

## 2023-03-06 NOTE — LETTER
3/6/2023         RE: Fabrice Donahue  [address] [address]        Dear Colleague,    Thank you for referring your patient, Fabrice Donahue, to the [facility]. Please see a copy of my visit note below.    Fabrice is here for a glucose tolerance test for growth hormone.  Orders written by Dr. Yung on 03/06/2023.  Fabrice arrived NPO, last eating at 630 pm last evening.  IV placed in left forearm without difficulty by [facility] lab RN.  Requested labs drawn.  Glucose and Insulin levels drawn at -0- 937, +30 1025, +60 1055, +90 1125, and + 120 1155.  Fabrice started drinking the Glucola at 955 and completed within 10 minutes.  Post blood sugar tested and was 144.  Snack given to patient prior to discharge.   PIV discontinued without difficulty.    SHANIA Mcduffie LPN    Administrations This Visit     glucose tolerence test (GLUCOLA) 25% oral liquid 75 g     Admin Date  03/06/2023 Action  $Given Dose  75 g Route  Oral Administered By  Owen Mcduffie LPN                          Again, thank you for allowing me to participate in the care of your patient.        Sincerely,        Specialty Infusion Nurse

## 2023-03-06 NOTE — NURSING NOTE
Chief Complaint   Patient presents with     Blood Draw     Labs drawn via piv start by RN. Vitals taken.     Labs drawn from PIV placed by RN. Line flushed with saline. Vitals taken. Pt checked in for appointment(s).    Marybeth Blanton RN

## 2023-03-06 NOTE — PROGRESS NOTES
Fabrice is here for a glucose tolerance test for growth hormone.  Orders written by Dr. Yung on 03/06/2023.  Fabrice arrived NPO, last eating at 630 pm last evening.  IV placed in left forearm without difficulty by [facility] lab RN.  Requested labs drawn.  Glucose and Insulin levels drawn at -0- 937, +30 1025, +60 1055, +90 1125, and + 120 1155.  Fabrice started drinking the Glucola at 955 and completed within 10 minutes.  Post blood sugar tested and was 144.  Snack given to patient prior to discharge.   PIV discontinued without difficulty.    SHANIA Mcduffie LPN    Administrations This Visit     glucose tolerence test (GLUCOLA) 25% oral liquid 75 g     Admin Date  03/06/2023 Action  $Given Dose  75 g Route  Oral Administered By  Owen Mcduffie LPN

## 2023-03-07 LAB
GH SERPL-MCNC: 0.1 UG/L
GH SERPL-MCNC: 0.8 UG/L
GH SERPL-MCNC: <0.1 UG/L

## 2023-03-10 ENCOUNTER — OFFICE VISIT (OUTPATIENT)
Dept: FAMILY MEDICINE | Facility: CLINIC | Age: 58
End: 2023-03-10
Payer: COMMERCIAL

## 2023-03-10 VITALS
DIASTOLIC BLOOD PRESSURE: 80 MMHG | TEMPERATURE: 97.3 F | SYSTOLIC BLOOD PRESSURE: 122 MMHG | OXYGEN SATURATION: 96 % | RESPIRATION RATE: 18 BRPM | WEIGHT: 174.1 LBS | BODY MASS INDEX: 26.39 KG/M2 | HEART RATE: 65 BPM | HEIGHT: 68 IN

## 2023-03-10 DIAGNOSIS — E34.9 HYPOTESTOSTERONISM: Primary | ICD-10-CM

## 2023-03-10 DIAGNOSIS — K52.9 CHRONIC DIARRHEA: Primary | ICD-10-CM

## 2023-03-10 DIAGNOSIS — D72.829 LEUKOCYTOSIS, UNSPECIFIED TYPE: ICD-10-CM

## 2023-03-10 LAB
ALBUMIN SERPL BCG-MCNC: 4.6 G/DL (ref 3.5–5.2)
ALP SERPL-CCNC: 52 U/L (ref 40–129)
ALT SERPL W P-5'-P-CCNC: 17 U/L (ref 10–50)
ANION GAP SERPL CALCULATED.3IONS-SCNC: 11 MMOL/L (ref 7–15)
AST SERPL W P-5'-P-CCNC: 27 U/L (ref 10–50)
BASOPHILS # BLD MANUAL: 0 10E3/UL (ref 0–0.2)
BASOPHILS NFR BLD MANUAL: 0 %
BILIRUB SERPL-MCNC: 0.6 MG/DL
BUN SERPL-MCNC: 11.9 MG/DL (ref 8–23)
CALCIUM SERPL-MCNC: 10.1 MG/DL (ref 8.6–10)
CHLORIDE SERPL-SCNC: 104 MMOL/L (ref 98–107)
CREAT SERPL-MCNC: 0.72 MG/DL (ref 0.67–1.17)
DEPRECATED HCO3 PLAS-SCNC: 24 MMOL/L (ref 22–29)
EOSINOPHIL # BLD MANUAL: 0.2 10E3/UL (ref 0–0.7)
EOSINOPHIL NFR BLD MANUAL: 2 %
ERYTHROCYTE [DISTWIDTH] IN BLOOD BY AUTOMATED COUNT: 12.7 % (ref 10–15)
GFR SERPL CREATININE-BSD FRML MDRD: >90 ML/MIN/1.73M2
GLUCOSE SERPL-MCNC: 97 MG/DL (ref 70–99)
HCT VFR BLD AUTO: 47.4 % (ref 40–53)
HGB BLD-MCNC: 15.6 G/DL (ref 13.3–17.7)
LYMPHOCYTES # BLD MANUAL: 5.8 10E3/UL (ref 0.8–5.3)
LYMPHOCYTES NFR BLD MANUAL: 53 %
MCH RBC QN AUTO: 30.4 PG (ref 26.5–33)
MCHC RBC AUTO-ENTMCNC: 32.9 G/DL (ref 31.5–36.5)
MCV RBC AUTO: 92 FL (ref 78–100)
MONOCYTES # BLD MANUAL: 0.4 10E3/UL (ref 0–1.3)
MONOCYTES NFR BLD MANUAL: 4 %
NEUTROPHILS # BLD MANUAL: 4.5 10E3/UL (ref 1.6–8.3)
NEUTROPHILS NFR BLD MANUAL: 41 %
PLAT MORPH BLD: ABNORMAL
PLATELET # BLD AUTO: 236 10E3/UL (ref 150–450)
POTASSIUM SERPL-SCNC: 4 MMOL/L (ref 3.4–5.3)
PROT SERPL-MCNC: 7.2 G/DL (ref 6.4–8.3)
RBC # BLD AUTO: 5.14 10E6/UL (ref 4.4–5.9)
RBC MORPH BLD: ABNORMAL
RETICS # AUTO: 0.09 10E6/UL (ref 0.03–0.1)
RETICS/RBC NFR AUTO: 1.8 % (ref 0.5–2)
SODIUM SERPL-SCNC: 139 MMOL/L (ref 136–145)
WBC # BLD AUTO: 11 10E3/UL (ref 4–11)

## 2023-03-10 PROCEDURE — 80053 COMPREHEN METABOLIC PANEL: CPT | Performed by: FAMILY MEDICINE

## 2023-03-10 PROCEDURE — 85060 BLOOD SMEAR INTERPRETATION: CPT | Performed by: PATHOLOGY

## 2023-03-10 PROCEDURE — 99214 OFFICE O/P EST MOD 30 MIN: CPT | Performed by: FAMILY MEDICINE

## 2023-03-10 PROCEDURE — 36415 COLL VENOUS BLD VENIPUNCTURE: CPT | Performed by: FAMILY MEDICINE

## 2023-03-10 PROCEDURE — 85045 AUTOMATED RETICULOCYTE COUNT: CPT | Performed by: FAMILY MEDICINE

## 2023-03-10 PROCEDURE — 85007 BL SMEAR W/DIFF WBC COUNT: CPT | Performed by: FAMILY MEDICINE

## 2023-03-10 PROCEDURE — 85027 COMPLETE CBC AUTOMATED: CPT | Performed by: FAMILY MEDICINE

## 2023-03-10 RX ORDER — TESTOSTERONE CYPIONATE 1000 MG/10ML
50 INJECTION, SOLUTION INTRAMUSCULAR WEEKLY
Qty: 12 ML | Refills: 3 | Status: SHIPPED | OUTPATIENT
Start: 2023-03-10 | End: 2023-03-13

## 2023-03-10 RX ORDER — NEEDLES, DISPOSABLE 25GX5/8"
1 NEEDLE, DISPOSABLE MISCELLANEOUS
Qty: 12 EACH | Refills: 3 | Status: SHIPPED | OUTPATIENT
Start: 2023-03-10 | End: 2024-02-20

## 2023-03-10 ASSESSMENT — ENCOUNTER SYMPTOMS: DIARRHEA: 1

## 2023-03-10 ASSESSMENT — PAIN SCALES - GENERAL: PAINLEVEL: NO PAIN (0)

## 2023-03-10 NOTE — PROGRESS NOTES
"  Assessment & Plan     Chronic diarrhea  Since early January, otherwise feeling well.  No melena, no blood.  Watery up to 5-10 times/day though.  No constipation.  Somewhat better last two weeks. No travel/known parasite risk and no h/o antibiotics preceding this.  Weight stable. Testosterone and calcitonin treatment relatively new although also pre-date symptoms. No other medication culprits likely per review of med list.     Unclear cause.  Could consider temporarily stopping testosterone since does have some known risk.    Tests as below , follow up with GI if does not resolve.     Can use fiber chews to help firm stools.   - Adult GI  Referral - Consult Only; Future  - C. difficile Toxin B PCR with reflex to C. difficile Antigen and Toxins A/B EIA; Future  - Ova and Parasite Exam Routine; Future  - Cryptosporidium/Giardia Immunoassay; Future  - Comprehensive metabolic panel (BMP + Alb, Alk Phos, ALT, AST, Total. Bili, TP); Future  - Ova and Parasite Exam Routine; Future  - Ova and Parasite Exam Routine; Future    Leukocytosis, unspecified type  Labs as below and can decide on follow up depending on results.  Full lymph exam normal today.   - CBC with platelets and differential; Future  - Lab Blood Morphology Pathologist Review; Future             BMI:   Estimated body mass index is 26.67 kg/m  as calculated from the following:    Height as of this encounter: 1.721 m (5' 7.75\").    Weight as of this encounter: 79 kg (174 lb 1.6 oz).           No follow-ups on file.    Joel Daniel Wegener, MD  [facility]    Latrice Avina is a 57 year old, presenting for the following health issues:  Diarrhea      Diarrhea    History of Present Illness       Reason for visit:  Elevated white blood cell count and persistent diarrhia    He eats 2-3 servings of fruits and vegetables daily.He consumes 0 sweetened beverage(s) daily.He exercises with enough effort to increase his heart rate 60 or more " "minutes per day.  He exercises with enough effort to increase his heart rate 4 days per week.   He is taking medications regularly.       Diarrhea  Onset/Duration: 2 months  Description:       Consistency of stool: watery and green       Blood in stool: No       Number of loose stools past 24 hours: 0  Progression of Symptoms: improving and intermittent  Accompanying signs and symptoms:       Fever: No       Nausea/Vomiting: No       Abdominal pain: No       Weight loss: No       Episodes of constipation: No  History   Ill contacts: No  Recent use of antibiotics: No  Recent travels: No  Recent medication-new or changes(Rx or OTC): YES- D-Mannose for UTI prevention  Precipitating or alleviating factors: None  Therapies tried and outcome: Has been recording what he's eating and choosing to avoid certain foods        Review of Systems   Gastrointestinal: Positive for diarrhea.            Objective    /80   Pulse 65   Temp 97.3  F (36.3  C) (Temporal)   Resp 18   Ht 1.721 m (5' 7.75\")   Wt 79 kg (174 lb 1.6 oz)   SpO2 96%   BMI 26.67 kg/m    Body mass index is 26.67 kg/m .  Physical Exam   No m/g/r   Full lymph exam normal today (neck, axilla, groin)  lctab   No hepatosplenomegally or abdominal masses or pain with palpation.                       "

## 2023-03-10 NOTE — PATIENT INSTRUCTIONS
"ASSESSMENT AND PLAN  1. Chronic diarrhea  Unclear cause.  Could consider temporarily stopping testosterone since does have some known risk.    Tests as below , follow up with GI if does not resolve.     Can use fiber chews to help firm stools.       - Adult GI  Referral - Consult Only; Future  - C. difficile Toxin B PCR with reflex to C. difficile Antigen and Toxins A/B EIA; Future  - Ova and Parasite Exam Routine; Future  - Cryptosporidium/Giardia Immunoassay; Future  - Comprehensive metabolic panel (BMP + Alb, Alk Phos, ALT, AST, Total. Bili, TP); Future  - Ova and Parasite Exam Routine; Future  - Ova and Parasite Exam Routine; Future    2. Leukocytosis, unspecified type  Labs as below and can decide on follow up depending on results.  Full lymph exam normal today.     - CBC with platelets and differential; Future  - Lab Blood Morphology Pathologist Review; Future        MYCHART SIGN UP: [email or web address] , [phone]    E-VISITS CAN BE DONE FOR CARE/PRESCRIPTIONS WHICH MAY NOT NEED AN IN-PERSON ASSESSMENT - click \"on-line care, then request e-visit\".      ONCARE VISIT/PRESCRIPTIONS: [email or web address]  - we treat nearly 50 common conditions with one hour response time     RADIOLOGY SCHEDULING  [facility]:  [phone]   [facility]: [phone]  [facility]: [phone]    Mammogram and Colonoscopy [phone]    Smoking Cessation: [email or web address], [phone]-PLAN ([phone])    [facility] Scheduling:  [phone].    CONSUMER PRICE LINE for estimates of test costs:  [phone]       "

## 2023-03-12 PROCEDURE — 87177 OVA AND PARASITES SMEARS: CPT | Mod: 59 | Performed by: FAMILY MEDICINE

## 2023-03-12 PROCEDURE — 87328 CRYPTOSPORIDIUM AG IA: CPT | Performed by: FAMILY MEDICINE

## 2023-03-12 PROCEDURE — 87329 GIARDIA AG IA: CPT | Performed by: FAMILY MEDICINE

## 2023-03-12 PROCEDURE — 87209 SMEAR COMPLEX STAIN: CPT | Performed by: FAMILY MEDICINE

## 2023-03-12 PROCEDURE — 2894A ROUTINE PARASITOLOGY EXAM: CPT | Mod: 59 | Performed by: FAMILY MEDICINE

## 2023-03-13 ENCOUNTER — APPOINTMENT (OUTPATIENT)
Dept: LAB | Facility: CLINIC | Age: 58
End: 2023-03-13
Payer: COMMERCIAL

## 2023-03-13 DIAGNOSIS — E34.9 HYPOTESTOSTERONISM: Primary | ICD-10-CM

## 2023-03-13 LAB
PATH REPORT.COMMENTS IMP SPEC: NORMAL
PATH REPORT.FINAL DX SPEC: NORMAL
PATH REPORT.MICROSCOPIC SPEC OTHER STN: NORMAL
PATH REPORT.MICROSCOPIC SPEC OTHER STN: NORMAL

## 2023-03-13 RX ORDER — TESTOSTERONE CYPIONATE 200 MG/ML
50 INJECTION, SOLUTION INTRAMUSCULAR WEEKLY
Qty: 4 ML | Refills: 5 | Status: SHIPPED | OUTPATIENT
Start: 2023-03-13 | End: 2024-02-20

## 2023-03-14 LAB
C PARVUM AG STL QL IA: NEGATIVE
G LAMBLIA AG STL QL IA: NEGATIVE
O+P STL MICRO: NEGATIVE

## 2023-06-12 ENCOUNTER — PATIENT OUTREACH (OUTPATIENT)
Dept: CARE COORDINATION | Facility: CLINIC | Age: 58
End: 2023-06-12
Payer: COMMERCIAL

## 2023-06-26 ENCOUNTER — PATIENT OUTREACH (OUTPATIENT)
Dept: CARE COORDINATION | Facility: CLINIC | Age: 58
End: 2023-06-26
Payer: COMMERCIAL

## 2023-07-24 DIAGNOSIS — I10 HYPERTENSION GOAL BP (BLOOD PRESSURE) < 130/80: ICD-10-CM

## 2023-07-24 DIAGNOSIS — E78.5 HYPERLIPIDEMIA LDL GOAL <130: ICD-10-CM

## 2023-07-25 RX ORDER — LISINOPRIL 10 MG/1
TABLET ORAL
Qty: 30 TABLET | Refills: 0 | Status: SHIPPED | OUTPATIENT
Start: 2023-07-25 | End: 2023-08-01

## 2023-07-25 RX ORDER — PRAVASTATIN SODIUM 20 MG
20 TABLET ORAL DAILY
Qty: 30 TABLET | Refills: 0 | Status: SHIPPED | OUTPATIENT
Start: 2023-07-25 | End: 2023-08-01

## 2023-07-25 NOTE — TELEPHONE ENCOUNTER
Prescription approved per [facility] Refill Protocol.  Has upcoming visit with other provider.  Shawnee CARR RN

## 2023-08-01 ENCOUNTER — OFFICE VISIT (OUTPATIENT)
Dept: FAMILY MEDICINE | Facility: CLINIC | Age: 58
End: 2023-08-01
Payer: COMMERCIAL

## 2023-08-01 VITALS
RESPIRATION RATE: 16 BRPM | BODY MASS INDEX: 25.76 KG/M2 | TEMPERATURE: 97 F | WEIGHT: 170 LBS | SYSTOLIC BLOOD PRESSURE: 130 MMHG | OXYGEN SATURATION: 96 % | HEIGHT: 68 IN | DIASTOLIC BLOOD PRESSURE: 82 MMHG | HEART RATE: 58 BPM

## 2023-08-01 DIAGNOSIS — M54.50 CHRONIC BILATERAL LOW BACK PAIN WITHOUT SCIATICA: ICD-10-CM

## 2023-08-01 DIAGNOSIS — I10 HYPERTENSION GOAL BP (BLOOD PRESSURE) < 140/90: ICD-10-CM

## 2023-08-01 DIAGNOSIS — Z00.00 ROUTINE ADULT HEALTH MAINTENANCE: Primary | ICD-10-CM

## 2023-08-01 DIAGNOSIS — G89.29 CHRONIC BILATERAL LOW BACK PAIN WITHOUT SCIATICA: ICD-10-CM

## 2023-08-01 DIAGNOSIS — E78.5 HYPERLIPIDEMIA LDL GOAL <130: ICD-10-CM

## 2023-08-01 PROBLEM — S22.000A COMPRESSION FRACTURE OF BODY OF THORACIC VERTEBRA (H): Status: RESOLVED | Noted: 2022-03-05 | Resolved: 2023-08-01

## 2023-08-01 PROBLEM — S22.000A COMPRESSION FRACTURE OF BODY OF THORACIC VERTEBRA (H): Status: ACTIVE | Noted: 2022-03-05

## 2023-08-01 LAB
CHOLEST SERPL-MCNC: 205 MG/DL
HDLC SERPL-MCNC: 26 MG/DL
LDLC SERPL CALC-MCNC: ABNORMAL MG/DL
LDLC SERPL DIRECT ASSAY-MCNC: 95 MG/DL
NONHDLC SERPL-MCNC: 179 MG/DL
TRIGL SERPL-MCNC: 550 MG/DL

## 2023-08-01 PROCEDURE — 99214 OFFICE O/P EST MOD 30 MIN: CPT | Mod: 25 | Performed by: PHYSICIAN ASSISTANT

## 2023-08-01 PROCEDURE — 80061 LIPID PANEL: CPT | Performed by: PHYSICIAN ASSISTANT

## 2023-08-01 PROCEDURE — 99396 PREV VISIT EST AGE 40-64: CPT | Performed by: PHYSICIAN ASSISTANT

## 2023-08-01 PROCEDURE — 83721 ASSAY OF BLOOD LIPOPROTEIN: CPT | Mod: 59 | Performed by: PHYSICIAN ASSISTANT

## 2023-08-01 PROCEDURE — 36415 COLL VENOUS BLD VENIPUNCTURE: CPT | Performed by: PHYSICIAN ASSISTANT

## 2023-08-01 RX ORDER — LISINOPRIL 10 MG/1
10 TABLET ORAL DAILY
Qty: 90 TABLET | Refills: 3 | Status: SHIPPED | OUTPATIENT
Start: 2023-08-01 | End: 2024-08-23

## 2023-08-01 RX ORDER — PRAVASTATIN SODIUM 20 MG
20 TABLET ORAL DAILY
Qty: 90 TABLET | Refills: 3 | Status: SHIPPED | OUTPATIENT
Start: 2023-08-01 | End: 2024-08-23

## 2023-08-01 RX ORDER — LANOLIN ALCOHOL/MO/W.PET/CERES
CREAM (GRAM) TOPICAL
COMMUNITY
Start: 2022-10-01

## 2023-08-01 ASSESSMENT — ENCOUNTER SYMPTOMS
EYE PAIN: 0
HEMATOCHEZIA: 0
DIZZINESS: 0
JOINT SWELLING: 0
FREQUENCY: 0
NAUSEA: 0
CHILLS: 0
COUGH: 0
MYALGIAS: 0
FEVER: 0
ABDOMINAL PAIN: 0
PALPITATIONS: 0
HEMATURIA: 0
SHORTNESS OF BREATH: 0
WEAKNESS: 0
HEADACHES: 0
CONSTIPATION: 0
DIARRHEA: 0
NERVOUS/ANXIOUS: 0
SORE THROAT: 0
ARTHRALGIAS: 0
DYSURIA: 0
HEARTBURN: 0
PARESTHESIAS: 0
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
## 2023-08-02 ENCOUNTER — MYC MEDICAL ADVICE (OUTPATIENT)
Dept: FAMILY MEDICINE | Facility: CLINIC | Age: 58
End: 2023-08-02
Payer: COMMERCIAL

## 2023-08-02 DIAGNOSIS — E78.1 HYPERTRIGLYCERIDEMIA: Primary | ICD-10-CM

## 2023-08-22 ENCOUNTER — THERAPY VISIT (OUTPATIENT)
Dept: PHYSICAL THERAPY | Facility: CLINIC | Age: 58
End: 2023-08-22
Attending: PHYSICIAN ASSISTANT
Payer: COMMERCIAL

## 2023-08-22 DIAGNOSIS — M54.6 MIDLINE THORACIC BACK PAIN: Primary | ICD-10-CM

## 2023-08-22 DIAGNOSIS — M54.50 CHRONIC BILATERAL LOW BACK PAIN WITHOUT SCIATICA: ICD-10-CM

## 2023-08-22 DIAGNOSIS — G89.29 CHRONIC BILATERAL LOW BACK PAIN WITHOUT SCIATICA: ICD-10-CM

## 2023-08-22 PROCEDURE — 97161 PT EVAL LOW COMPLEX 20 MIN: CPT | Mod: GP

## 2023-08-22 PROCEDURE — 97110 THERAPEUTIC EXERCISES: CPT | Mod: GP
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
## 2023-09-05 ENCOUNTER — TRANSFERRED RECORDS (OUTPATIENT)
Dept: HEALTH INFORMATION MANAGEMENT | Facility: CLINIC | Age: 58
End: 2023-09-05

## 2023-09-09 ENCOUNTER — MYC MEDICAL ADVICE (OUTPATIENT)
Dept: FAMILY MEDICINE | Facility: CLINIC | Age: 58
End: 2023-09-09
Payer: COMMERCIAL

## 2023-09-18 ENCOUNTER — TRANSFERRED RECORDS (OUTPATIENT)
Dept: HEALTH INFORMATION MANAGEMENT | Facility: CLINIC | Age: 58
End: 2023-09-18
Payer: COMMERCIAL

## 2023-10-02 ENCOUNTER — TRANSFERRED RECORDS (OUTPATIENT)
Dept: HEALTH INFORMATION MANAGEMENT | Facility: CLINIC | Age: 58
End: 2023-10-02
Payer: COMMERCIAL

## 2023-10-30 ENCOUNTER — TRANSFERRED RECORDS (OUTPATIENT)
Dept: HEALTH INFORMATION MANAGEMENT | Facility: CLINIC | Age: 58
End: 2023-10-30
Payer: COMMERCIAL

## 2023-11-27 ENCOUNTER — TRANSFERRED RECORDS (OUTPATIENT)
Dept: HEALTH INFORMATION MANAGEMENT | Facility: CLINIC | Age: 58
End: 2023-11-27
Payer: COMMERCIAL

## 2023-12-14 ENCOUNTER — TELEPHONE (OUTPATIENT)
Dept: FAMILY MEDICINE | Facility: CLINIC | Age: 58
End: 2023-12-14
Payer: COMMERCIAL

## 2023-12-14 NOTE — TELEPHONE ENCOUNTER
Called patient and left voicemail. Please relay provider message if pt. calls back. Thank you.           Helen Bass, MSN, RN   [employer]
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
## 2023-12-14 NOTE — TELEPHONE ENCOUNTER
Would continue to monitor pain for now.  If worsens, recommend being seen in clinic.  Agree with COVID plan

## 2024-01-31 ENCOUNTER — OFFICE VISIT (OUTPATIENT)
Dept: FAMILY MEDICINE | Facility: CLINIC | Age: 59
End: 2024-01-31
Payer: COMMERCIAL

## 2024-01-31 VITALS
DIASTOLIC BLOOD PRESSURE: 92 MMHG | HEART RATE: 65 BPM | TEMPERATURE: 96.9 F | HEIGHT: 68 IN | RESPIRATION RATE: 15 BRPM | OXYGEN SATURATION: 97 % | SYSTOLIC BLOOD PRESSURE: 146 MMHG | WEIGHT: 174 LBS | BODY MASS INDEX: 26.37 KG/M2

## 2024-01-31 DIAGNOSIS — R30.0 DYSURIA: Primary | ICD-10-CM

## 2024-01-31 LAB
ALBUMIN UR-MCNC: NEGATIVE MG/DL
AMORPH CRY #/AREA URNS HPF: ABNORMAL /HPF
APPEARANCE UR: ABNORMAL
BACTERIA #/AREA URNS HPF: ABNORMAL /HPF
BILIRUB UR QL STRIP: NEGATIVE
COLOR UR AUTO: YELLOW
GLUCOSE UR STRIP-MCNC: NEGATIVE MG/DL
HGB UR QL STRIP: NEGATIVE
KETONES UR STRIP-MCNC: NEGATIVE MG/DL
LEUKOCYTE ESTERASE UR QL STRIP: NEGATIVE
MUCOUS THREADS #/AREA URNS LPF: PRESENT /LPF
NITRATE UR QL: NEGATIVE
PH UR STRIP: 7.5 [PH] (ref 5–7)
RBC #/AREA URNS AUTO: ABNORMAL /HPF
SP GR UR STRIP: 1.02 (ref 1–1.03)
SQUAMOUS #/AREA URNS AUTO: ABNORMAL /LPF
UROBILINOGEN UR STRIP-ACNC: 0.2 E.U./DL
WBC #/AREA URNS AUTO: ABNORMAL /HPF

## 2024-01-31 PROCEDURE — 99213 OFFICE O/P EST LOW 20 MIN: CPT | Performed by: PHYSICIAN ASSISTANT

## 2024-01-31 PROCEDURE — 81001 URINALYSIS AUTO W/SCOPE: CPT | Performed by: PHYSICIAN ASSISTANT

## 2024-01-31 PROCEDURE — 87086 URINE CULTURE/COLONY COUNT: CPT | Performed by: PHYSICIAN ASSISTANT

## 2024-01-31 PROCEDURE — 87186 SC STD MICRODIL/AGAR DIL: CPT | Performed by: PHYSICIAN ASSISTANT

## 2024-01-31 PROCEDURE — 87088 URINE BACTERIA CULTURE: CPT | Performed by: PHYSICIAN ASSISTANT

## 2024-01-31 RX ORDER — SULFAMETHOXAZOLE/TRIMETHOPRIM 800-160 MG
1 TABLET ORAL 2 TIMES DAILY
Qty: 14 TABLET | Refills: 0 | Status: SHIPPED | OUTPATIENT
Start: 2024-01-31 | End: 2024-02-07

## 2024-01-31 ASSESSMENT — PAIN SCALES - GENERAL: PAINLEVEL: MILD PAIN (2)

## 2024-01-31 NOTE — PROGRESS NOTES
"  Assessment & Plan     Dysuria  No leukocytes or nitrates on macro, but micro with moderate bacteria and given symptoms consistent with previous UTIs, will treat empirically today with Bactrim. Informed that the sample will culture and if shows no bacteria growth, may get a call to stop the antibiotic.   - UA Macroscopic with reflex to Microscopic and Culture - Lab Collect  - UA Macroscopic with reflex to Microscopic and Culture - Lab Collect  - UA Microscopic with Reflex to Culture  - Urine Culture Aerobic Bacterial - lab collect  - Urine Culture Aerobic Bacterial - lab collect  - sulfamethoxazole-trimethoprim (BACTRIM DS) 800-160 MG tablet  Dispense: 14 tablet; Refill: 0      BMI  Estimated body mass index is 26.46 kg/m  as calculated from the following:    Height as of this encounter: 1.727 m (5' 8\").    Weight as of this encounter: 78.9 kg (174 lb).       Latrice Avina is a 58 year old, presenting for the following health issues:  Dysuria        1/31/2024    10:19 AM   Additional Questions   Roomed by Sarah Nicole     History of Present Illness       Reason for visit:  UTI.  Pain while urination and home test indicates positive.  Similar symptoms as in the past.  Symptom onset:  1-3 days ago  Symptoms include:  Pain while urination and cloudy urine.  Symptom intensity:  Moderate  Symptom progression:  Staying the same  Had these symptoms before:  Yes  Has tried/received treatment for these symptoms:  Yes  Previous treatment was successful:  Yes  Prior treatment description:  Typical antibiotics for UTI.    He eats 2-3 servings of fruits and vegetables daily.He consumes 0 sweetened beverage(s) daily.He exercises with enough effort to increase his heart rate 60 or more minutes per day.  He exercises with enough effort to increase his heart rate 5 days per week.   He is taking medications regularly.    Fabrice here today for dysuria  3-4 days ago started having bladder discomfort   Mild burning with urination, " "slight increased frequency, cloudy appearance  No hematuria, fevers, low back pain, no penile discharge, no urinary stream difficulty or urethral discharge  Sexually active in monogamous relationship with his wife, no concerns for STI  Has had UTI in past, seems to have similar symptoms and he is going out of town tomorrow so wanted to get ahead of it if needed treatment      Review of Systems  Constitutional, HEENT, cardiovascular, pulmonary, gi and gu systems are negative, except as otherwise noted in HPI.      Objective    BP (!) 146/92 (BP Location: Right arm, Patient Position: Sitting, Cuff Size: Adult Regular)   Pulse 65   Temp 96.9  F (36.1  C) (Temporal)   Resp 15   Ht 1.727 m (5' 8\")   Wt 78.9 kg (174 lb)   SpO2 97%   BMI 26.46 kg/m    Body mass index is 26.46 kg/m .  Physical Exam   GENERAL: alert and no distress  RESP: lungs clear to auscultation - no rales, rhonchi or wheezes  CV: regular rate and rhythm, normal S1 S2, no S3 or S4, no murmur, click or rub, no peripheral edema  ABDOMEN: soft, nontender  BACK: no CVA tenderness, no paralumbar tenderness    Results for orders placed or performed in visit on 01/31/24 (from the past 24 hour(s))   UA Macroscopic with reflex to Microscopic and Culture - Lab Collect    Specimen: Urine, Clean Catch   Result Value Ref Range    Color Urine Yellow Colorless, Straw, Light Yellow, Yellow    Appearance Urine Slightly Cloudy (A) Clear    Glucose Urine Negative Negative mg/dL    Bilirubin Urine Negative Negative    Ketones Urine Negative Negative mg/dL    Specific Gravity Urine 1.020 1.003 - 1.035    Blood Urine Negative Negative    pH Urine 7.5 (H) 5.0 - 7.0    Protein Albumin Urine Negative Negative mg/dL    Urobilinogen Urine 0.2 0.2, 1.0 E.U./dL    Nitrite Urine Negative Negative    Leukocyte Esterase Urine Negative Negative   UA Microscopic with Reflex to Culture   Result Value Ref Range    Bacteria Urine Moderate (A) None Seen /HPF    RBC Urine None Seen 0-2 " /HPF /HPF    WBC Urine None Seen 0-5 /HPF /HPF    Squamous Epithelials Urine Few (A) None Seen /LPF    Mucus Urine Present (A) None Seen /LPF    Amorphous Crystals Urine Many (A) None Seen /HPF    Narrative    Urine Culture not indicated         Jada Rizzo NP Student    I personally supervised the student listed above. The above note is reflective of my independent physical exam and medical decision making.    Sarah Rdz PA-C 1/31/2024, 11:33 AM  [facility]      Signed Electronically by: Sarah Rdz PA-C  dys

## 2024-02-02 LAB — BACTERIA UR CULT: ABNORMAL

## 2024-02-20 ENCOUNTER — VIRTUAL VISIT (OUTPATIENT)
Dept: ENDOCRINOLOGY | Facility: CLINIC | Age: 59
End: 2024-02-20
Payer: COMMERCIAL

## 2024-02-20 DIAGNOSIS — N52.9 ERECTILE DYSFUNCTION, UNSPECIFIED ERECTILE DYSFUNCTION TYPE: ICD-10-CM

## 2024-02-20 DIAGNOSIS — M85.851 OSTEOPENIA OF BOTH HIPS: ICD-10-CM

## 2024-02-20 DIAGNOSIS — E29.1 MALE HYPOGONADISM: Primary | ICD-10-CM

## 2024-02-20 DIAGNOSIS — M85.852 OSTEOPENIA OF BOTH HIPS: ICD-10-CM

## 2024-02-20 DIAGNOSIS — R79.89 HIGH SERUM INSULIN-LIKE GROWTH FACTOR 1 (IGF-1): ICD-10-CM

## 2024-02-20 PROCEDURE — 99214 OFFICE O/P EST MOD 30 MIN: CPT | Mod: 95 | Performed by: INTERNAL MEDICINE
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
## 2024-02-20 NOTE — NURSING NOTE
Is the patient currently in the state of [place]? YES    Visit mode:VIDEO    If the visit is dropped, the patient can be reconnected by: VIDEO VISIT: Text to cell phone:   Telephone Information:   Mobile [phone]       Will anyone else be joining the visit? NO  (If patient encounters technical issues they should call [phone]    How would you like to obtain your AVS? MyChart    Are changes needed to the allergy or medication list? No, Pt declined allergy review, and Pt declined med review    Reason for visit: NASIR DUBOIS
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
## 2024-02-20 NOTE — PATIENT INSTRUCTIONS
Food Calcium in milligrams   Milk (skim, 2%, or whole; 8 oz [240 mL]) 300   Yogurt (6 oz [168 g]) 250   Orange juice (with calcium; 8 oz [240 mL]) 300   Tofu with calcium (0.5 cup [113 g]) 435   Cheese (1 oz [28 g]) 195 to 335 (hard cheese = higher calcium)   Cottage cheese (0.5 cup [113 g]) 130   Ice cream or frozen yogurt (0.5 cup [113 g]) 100   Non-dairy milks (soy, oat, almond; 8 oz [240 mL]) 300 to 450   Beans (0.5 cup cooked [113 g]) 60 to 80   Dark, leafy green vegetables (0.5 cup cooked [113 g]) 50 to 135   Almonds (24 whole) 70   Orange (1 medium) 60     Total daily dose of calcium from food products and/or supplements should be ~ 1000 mg.    The calcium is better absorbed if it is taken in smaller dosages, 2-3 times a day.    Total daily dose of vitamin D should be 1000 - 2000 units (25-50 mcg).

## 2024-02-21 ENCOUNTER — TELEPHONE (OUTPATIENT)
Dept: ENDOCRINOLOGY | Facility: CLINIC | Age: 59
End: 2024-02-21
Payer: COMMERCIAL

## 2024-02-21 NOTE — CONFIDENTIAL NOTE
Left Voicemail (1st Attempt) for the patient to call back and schedule the following:    Appointment type: return endocrine  Provider:    Return date: 08/20/2024  Specialty phone number: [phone]  Additional appointment(s) needed: Dexa scan prior to return visit, labs prior to follow up   Additonal Notes: follow up in 6 months with DEXA exam and labs prior       [facility]   Gastroenterology, Infectious Diseases, Nephrology, Pulmonology and Rheumatology Specialties  [facility]

## 2024-02-23 ENCOUNTER — MYC MEDICAL ADVICE (OUTPATIENT)
Dept: FAMILY MEDICINE | Facility: CLINIC | Age: 59
End: 2024-02-23
Payer: COMMERCIAL

## 2024-02-23 DIAGNOSIS — Z87.440 RECENT URINARY TRACT INFECTION: Primary | ICD-10-CM

## 2024-02-23 NOTE — TELEPHONE ENCOUNTER
"Sarah -- Pt finished abx but still having symptoms. Given timeline does it sound like reinfection vs. Unresolved previous UTI? Lab orders? Evisit?    \"I would say more of an ache in my lower abdomen with slight pain while urinating, cloudy urine, and frequent urination. Very similar to what I was experiencing when I came in the last time.\"    Rosy Wynne RN  [facility]    "

## 2024-02-26 ENCOUNTER — LAB (OUTPATIENT)
Dept: LAB | Facility: CLINIC | Age: 59
End: 2024-02-26
Payer: COMMERCIAL

## 2024-02-26 DIAGNOSIS — Z87.440 RECENT URINARY TRACT INFECTION: ICD-10-CM

## 2024-02-26 DIAGNOSIS — M85.852 OSTEOPENIA OF BOTH HIPS: ICD-10-CM

## 2024-02-26 DIAGNOSIS — M85.851 OSTEOPENIA OF BOTH HIPS: ICD-10-CM

## 2024-02-26 DIAGNOSIS — I10 HYPERTENSION GOAL BP (BLOOD PRESSURE) < 140/90: Primary | ICD-10-CM

## 2024-02-26 DIAGNOSIS — E29.1 MALE HYPOGONADISM: ICD-10-CM

## 2024-02-26 LAB
ALBUMIN SERPL BCG-MCNC: 4.5 G/DL (ref 3.5–5.2)
ALBUMIN UR-MCNC: NEGATIVE MG/DL
ALP SERPL-CCNC: 62 U/L (ref 40–150)
ALT SERPL W P-5'-P-CCNC: 53 U/L (ref 0–70)
ANION GAP SERPL CALCULATED.3IONS-SCNC: 12 MMOL/L (ref 7–15)
APPEARANCE UR: CLEAR
AST SERPL W P-5'-P-CCNC: 33 U/L (ref 0–45)
BILIRUB SERPL-MCNC: 0.3 MG/DL
BILIRUB UR QL STRIP: NEGATIVE
BUN SERPL-MCNC: 9.3 MG/DL (ref 8–23)
CALCIUM SERPL-MCNC: 9.5 MG/DL (ref 8.8–10.2)
CHLORIDE SERPL-SCNC: 101 MMOL/L (ref 98–107)
COLOR UR AUTO: YELLOW
CREAT SERPL-MCNC: 0.72 MG/DL (ref 0.67–1.17)
DEPRECATED HCO3 PLAS-SCNC: 26 MMOL/L (ref 22–29)
EGFRCR SERPLBLD CKD-EPI 2021: >90 ML/MIN/1.73M2
FSH SERPL IRP2-ACNC: 17.3 MIU/ML (ref 1.5–12.4)
GLUCOSE SERPL-MCNC: 153 MG/DL (ref 70–99)
GLUCOSE UR STRIP-MCNC: NEGATIVE MG/DL
HGB UR QL STRIP: NEGATIVE
KETONES UR STRIP-MCNC: NEGATIVE MG/DL
LEUKOCYTE ESTERASE UR QL STRIP: NEGATIVE
LH SERPL-ACNC: 11.5 MIU/ML (ref 1.7–8.6)
NITRATE UR QL: NEGATIVE
PH UR STRIP: 6 [PH] (ref 5–7)
PHOSPHATE SERPL-MCNC: 3.1 MG/DL (ref 2.5–4.5)
POTASSIUM SERPL-SCNC: 3.6 MMOL/L (ref 3.4–5.3)
PROT SERPL-MCNC: 6.9 G/DL (ref 6.4–8.3)
PTH-INTACT SERPL-MCNC: 18 PG/ML (ref 15–65)
SHBG SERPL-SCNC: 24 NMOL/L (ref 11–80)
SODIUM SERPL-SCNC: 139 MMOL/L (ref 135–145)
SP GR UR STRIP: 1.01 (ref 1–1.03)
UROBILINOGEN UR STRIP-ACNC: 0.2 E.U./DL

## 2024-02-26 PROCEDURE — 83002 ASSAY OF GONADOTROPIN (LH): CPT

## 2024-02-26 PROCEDURE — 84100 ASSAY OF PHOSPHORUS: CPT

## 2024-02-26 PROCEDURE — 84270 ASSAY OF SEX HORMONE GLOBUL: CPT

## 2024-02-26 PROCEDURE — 84403 ASSAY OF TOTAL TESTOSTERONE: CPT

## 2024-02-26 PROCEDURE — 83970 ASSAY OF PARATHORMONE: CPT

## 2024-02-26 PROCEDURE — 81003 URINALYSIS AUTO W/O SCOPE: CPT

## 2024-02-26 PROCEDURE — 82306 VITAMIN D 25 HYDROXY: CPT

## 2024-02-26 PROCEDURE — 83001 ASSAY OF GONADOTROPIN (FSH): CPT

## 2024-02-26 PROCEDURE — 80053 COMPREHEN METABOLIC PANEL: CPT

## 2024-02-26 PROCEDURE — 87086 URINE CULTURE/COLONY COUNT: CPT

## 2024-02-26 PROCEDURE — 36415 COLL VENOUS BLD VENIPUNCTURE: CPT

## 2024-02-27 DIAGNOSIS — R73.09 ELEVATED GLUCOSE: Primary | ICD-10-CM

## 2024-02-27 LAB — BACTERIA UR CULT: NO GROWTH

## 2024-02-28 LAB
DEPRECATED CALCIDIOL+CALCIFEROL SERPL-MC: <59 UG/L (ref 20–75)
TESTOST FREE SERPL-MCNC: 3.6 NG/DL
TESTOST SERPL-MCNC: 161 NG/DL (ref 240–950)
VITAMIN D2 SERPL-MCNC: <5 UG/L
VITAMIN D3 SERPL-MCNC: 54 UG/L

## 2024-02-29 ENCOUNTER — MYC MEDICAL ADVICE (OUTPATIENT)
Dept: FAMILY MEDICINE | Facility: CLINIC | Age: 59
End: 2024-02-29
Payer: COMMERCIAL

## 2024-02-29 DIAGNOSIS — R73.03 PREDIABETES: Primary | ICD-10-CM

## 2024-03-01 ENCOUNTER — LAB (OUTPATIENT)
Dept: LAB | Facility: CLINIC | Age: 59
End: 2024-03-01
Payer: COMMERCIAL

## 2024-03-01 DIAGNOSIS — R73.09 ELEVATED GLUCOSE: ICD-10-CM

## 2024-03-01 LAB — HBA1C MFR BLD: 6 % (ref 0–5.6)

## 2024-03-01 PROCEDURE — 83036 HEMOGLOBIN GLYCOSYLATED A1C: CPT

## 2024-03-01 PROCEDURE — 36415 COLL VENOUS BLD VENIPUNCTURE: CPT

## 2024-03-01 NOTE — RESULT ENCOUNTER NOTE
The testosterone level is lowish.  Free testosterone is the active testosterone and is the most accurate lab work in evaluating the testicular function.  Your level is 3.6, with a normal range being 4.1-23.9.  It is still possible for the testicular function to improve. It sometimes take longer for the testosterone level to normalize following the administration of injectable testosterone. I recommend to have a testosterone level rechecked at your next apt with me from September. The other labs are unremarkable.

## 2024-03-06 PROBLEM — R73.03 PREDIABETES: Status: ACTIVE | Noted: 2024-03-06

## 2024-03-06 NOTE — TELEPHONE ENCOUNTER
"Sarah-Please review patient's response:    \"My concern is I don't have a poor diet at the moment. Certainly room for improvement and will implement some improvements. I think my physical activity is already pretty high roughly an hour a day 5 times a week and then longer on weekends. Seems out of whack to me without explanation. \"    Thank you!  EMILY DysonN, RN-BC  [facility]    "

## 2024-04-03 ENCOUNTER — OFFICE VISIT (OUTPATIENT)
Dept: URGENT CARE | Facility: URGENT CARE | Age: 59
End: 2024-04-03
Payer: COMMERCIAL

## 2024-04-03 ENCOUNTER — NURSE TRIAGE (OUTPATIENT)
Dept: FAMILY MEDICINE | Facility: CLINIC | Age: 59
End: 2024-04-03
Payer: COMMERCIAL

## 2024-04-03 VITALS
TEMPERATURE: 97 F | BODY MASS INDEX: 26.15 KG/M2 | WEIGHT: 172 LBS | DIASTOLIC BLOOD PRESSURE: 90 MMHG | SYSTOLIC BLOOD PRESSURE: 143 MMHG | HEART RATE: 73 BPM | OXYGEN SATURATION: 99 %

## 2024-04-03 DIAGNOSIS — R30.0 DYSURIA: ICD-10-CM

## 2024-04-03 DIAGNOSIS — N48.1 BALANITIS: Primary | ICD-10-CM

## 2024-04-03 LAB
ALBUMIN UR-MCNC: NEGATIVE MG/DL
AMORPH CRY #/AREA URNS HPF: ABNORMAL /HPF
APPEARANCE UR: ABNORMAL
BACTERIA #/AREA URNS HPF: ABNORMAL /HPF
BILIRUB UR QL STRIP: NEGATIVE
COLOR UR AUTO: YELLOW
GLUCOSE UR STRIP-MCNC: NEGATIVE MG/DL
HGB UR QL STRIP: NEGATIVE
KETONES UR STRIP-MCNC: NEGATIVE MG/DL
LEUKOCYTE ESTERASE UR QL STRIP: NEGATIVE
NITRATE UR QL: NEGATIVE
PH UR STRIP: 7.5 [PH] (ref 5–7)
RBC #/AREA URNS AUTO: ABNORMAL /HPF
SP GR UR STRIP: 1.02 (ref 1–1.03)
SQUAMOUS #/AREA URNS AUTO: ABNORMAL /LPF
UROBILINOGEN UR STRIP-ACNC: 0.2 E.U./DL
WBC #/AREA URNS AUTO: ABNORMAL /HPF

## 2024-04-03 PROCEDURE — 99214 OFFICE O/P EST MOD 30 MIN: CPT | Performed by: NURSE PRACTITIONER

## 2024-04-03 PROCEDURE — 81001 URINALYSIS AUTO W/SCOPE: CPT

## 2024-04-03 PROCEDURE — 87086 URINE CULTURE/COLONY COUNT: CPT | Performed by: NURSE PRACTITIONER

## 2024-04-03 RX ORDER — CEPHALEXIN 500 MG/1
500 CAPSULE ORAL 4 TIMES DAILY
Qty: 28 CAPSULE | Refills: 0 | Status: SHIPPED | OUTPATIENT
Start: 2024-04-03 | End: 2024-04-11

## 2024-04-03 RX ORDER — MICONAZOLE NITRATE 20 MG/G
CREAM TOPICAL 2 TIMES DAILY
Qty: 15 G | Refills: 0 | Status: SHIPPED | OUTPATIENT
Start: 2024-04-03 | End: 2024-04-13

## 2024-04-03 ASSESSMENT — PAIN SCALES - GENERAL: PAINLEVEL: MILD PAIN (3)

## 2024-04-03 NOTE — TELEPHONE ENCOUNTER
Nurse Triage SBAR    Is this a 2nd Level Triage? YES, LICENSED PRACTITIONER REVIEW IS REQUIRED    Situation: swelling and redness on shaft of penis    Background: Pt felt itching and irritation of penis last night, woke him from sleep and this morning noticed shaft of penis was red and swollen    Hx of frequent UTI (4-5 times in past 2 years)    Assessment:     Shaft of penis is red, warm and swollen, some area not as swollen, toward head of penis it is more swollen.   reporting itching, irritation on penis during the night  Achy/throbbing pelvic pain (3/10)  Burning and discomfort in urination    Denies abdominal, back or flank pain, discharge, hematuria, rash, fever, swelling elsewhere, hx of swelling. Not tender to touch    Protocol Recommended Disposition:   Go To Office Now    Recommendation: Seen in clinic this morning or [facility] if no availability.   Pt agreed with plan, will wait for call back from clinic and will go to [facility] if no appointments avail     Routed to provider    Does the patient meet one of the following criteria for ADS visit consideration? 16+ years old, with an [facility] PCP     TIP  Providers, please consider if this condition is appropriate for management at one of our Acute and Diagnostic Services sites.     If patient is a good candidate, please use dotphrase <dot>triageresponse and select Refer to ADS to document.     Reason for Disposition   Entire penis is swollen (i.e., edema)    Additional Information   Negative: Followed a genital area injury (e.g., penis, scrotum)   Negative: Pain or burning with passing urine (urination) is main symptom   Negative: Blood in urine (red, pink, or tea-colored)   Negative: Pain in scrotum or testicle is main symptom   Negative: Swollen scrotum OR lump in the scrotum/groin area   Negative: Pubic lice suspected   Negative: Sexually Transmitted Infection (STI) exposure and prevention, question about   Negative: SEVERE pain (e.g., excruciating)   Negative:  "Foreskin pulled back and stuck (not circumcised)   Negative: Fever > 100.4 F (38.0 C)   Negative: Unable to urinate (or only a few drops) and bladder feels very full   Negative: Prolonged unwanted erection (i.e., not related to sexual interest) and lasting > 4 hours   Negative: Patient sounds very sick or weak to the triager    Answer Assessment - Initial Assessment Questions  1. SYMPTOM: \"What's the main symptom you're concerned about?\" (e.g., discharge from penis, rash, pain, itching, swelling)      Woke up this morning and noticed swelling in shaft of penis. Some places not as swollen, toward head of penis it is more swollen.   Skin is red and warm, not bluish in color anywhere  2. LOCATION: \"Where is the swelling located?\"      Shaft of penis  3. ONSET: \"When did sx  start?\"      Last night felt itching and irritation  4. PAIN: \"Is there any pain?\" If Yes, ask: \"How bad is it?\"  (Scale 1-10; or mild, moderate, severe)      3/10 pelvic pain  5. URINE: \"Any difficulty passing urine?\" If Yes, ask: \"When was the last time?\"      no  6. CAUSE: \"What do you think is causing the symptoms?\"      Unsure- thinks may be a UTI  7. OTHER SYMPTOMS: \"Do you have any other symptoms?\" (e.g., fever, abdomen pain, blood in urine)      Achy/throbbing pelvic pain, burning/discomfort with urinating, itching and irritation    Protocols used: Penis and Scrotum Symptoms-GLORY-SUE QUINTANA RN  [facility]   "

## 2024-04-03 NOTE — PATIENT INSTRUCTIONS
Keflex and miconazole for balanitis  Urine culture pending, will call if change in antibiotic is needed  Probiotics recommended  Only wash with water open to air at bedtime cotton underwear  Reevaluation in a couple days if worsening symptoms, ER if severe

## 2024-04-03 NOTE — TELEPHONE ENCOUNTER
Called patient and informed there are no openings in clinic today.  Pt states he will present to urgent care when they open today.    Nicole FRANCES RN  [facility]

## 2024-04-03 NOTE — PROGRESS NOTES
Chief Complaint   Patient presents with    Urinary Problem     Swollen penis x's 3 days     Fever     On Monday      SUBJECTIVE:   Fabrice Donahue is a 58 year old male who  presents today with a red swollen penis shaft for the last 2 days.  Has noted slight itch irritation tenderness fever several days ago dysuria.  Has had UTIs previously that show up on culture.  He did use the wet wipe prior to collecting his urine today.  Declines sweats chills nausea vomiting flank pain testicular or scrotal involvement lesions or any concern for STDs.  Was at his in-laws and used a different detergent.  No hot tub or swimming pool use.    Past Medical History:   Diagnosis Date    Benign neoplasm of skin of other and unspecified parts of face     atypical nevus    Compression fracture of body of thoracic vertebra (H) 03/05/2022    Essential hypertension, benign     Lyme disease 08/2007    hot welts, bull eye bruise    Mixed hyperlipidemia     Other chronic nonalcoholic liver disease 2006    Fatty liver    Shoulder pain, right     post clavicular fx    Syncope 07/2008    neg Head/Neck CT    Unspecified hemorrhoids without mention of complication      Current Outpatient Medications   Medication Sig Dispense Refill    calcitonin, salmon, (MIACALCIN) 200 UNIT/ACT nasal spray Spray 1 spray into one nostril alternating nostrils daily Alternate nostril each day. 3.7 mL 11    calcium citrate-vitamin D (CALCIUM CITRATE + D3) 315-5 MG-MCG TABS per tablet       cephALEXin (KEFLEX) 500 MG capsule Take 1 capsule (500 mg) by mouth 4 times daily for 7 days 28 capsule 0    lisinopril (ZESTRIL) 10 MG tablet Take 1 tablet (10 mg) by mouth daily 90 tablet 3    miconazole (MICATIN) 2 % external cream Apply topically 2 times daily for 10 days 15 g 0    Multiple Vitamin (MULTI-VITAMIN) per tablet Take 1 tablet by mouth daily. 100 tablet 12    pravastatin (PRAVACHOL) 20 MG tablet Take 1 tablet (20 mg) by mouth daily 90 tablet 3     Social History      Tobacco Use    Smoking status: Former     Packs/day: 0.50     Years: 5.00     Additional pack years: 0.00     Total pack years: 2.50     Types: Cigarettes     Quit date: 10/1/2010     Years since quittin.5    Smokeless tobacco: Never    Tobacco comments:     Already quit smoking   Substance Use Topics    Alcohol use: Yes     Comment: Less than 1 drink a week; occasional beer     Allergies   Allergen Reactions    Ivp Dye [Contrast Dye]        Review of Systems  All systems negative except for those listed above in HPI.    OBJECTIVE:  BP (!) 143/90 (BP Location: Right arm, Patient Position: Sitting, Cuff Size: Adult Regular)   Pulse 73   Temp 97  F (36.1  C) (Oral)   Wt 78 kg (172 lb)   SpO2 99%   BMI 26.15 kg/m      Physical Exam  Vitals reviewed. Exam conducted with a chaperone present.   Constitutional:       Appearance: Normal appearance.   HENT:      Head: Normocephalic and atraumatic.      Nose: Nose normal.      Mouth/Throat:      Mouth: Mucous membranes are moist.      Pharynx: Oropharynx is clear.   Eyes:      Extraocular Movements: Extraocular movements intact.      Conjunctiva/sclera: Conjunctivae normal.      Pupils: Pupils are equal, round, and reactive to light.   Cardiovascular:      Rate and Rhythm: Normal rate.      Pulses: Normal pulses.   Pulmonary:      Effort: Pulmonary effort is normal.   Genitourinary:     Comments: Penile mid shaft with area of streaky erythema edema.  Urethra scrotum and testicles unremarkable.  Musculoskeletal:         General: Normal range of motion.      Cervical back: Normal range of motion and neck supple.   Skin:     General: Skin is warm and dry.      Findings: No rash.   Neurological:      General: No focal deficit present.      Mental Status: He is alert and oriented to person, place, and time.   Psychiatric:         Mood and Affect: Mood normal.         Behavior: Behavior normal.       Results for orders placed or performed in visit on 24   UA  Macroscopic with reflex to Microscopic and Culture - Clinic Collect     Status: Abnormal    Specimen: Urine, Clean Catch   Result Value Ref Range    Color Urine Yellow Colorless, Straw, Light Yellow, Yellow    Appearance Urine Cloudy (A) Clear    Glucose Urine Negative Negative mg/dL    Bilirubin Urine Negative Negative    Ketones Urine Negative Negative mg/dL    Specific Gravity Urine 1.020 1.003 - 1.035    Blood Urine Negative Negative    pH Urine 7.5 (H) 5.0 - 7.0    Protein Albumin Urine Negative Negative mg/dL    Urobilinogen Urine 0.2 0.2, 1.0 E.U./dL    Nitrite Urine Negative Negative    Leukocyte Esterase Urine Negative Negative   UA Microscopic with Reflex to Culture     Status: Abnormal   Result Value Ref Range    Bacteria Urine Few (A) None Seen /HPF    RBC Urine 0-2 0-2 /HPF /HPF    WBC Urine 0-5 0-5 /HPF /HPF    Squamous Epithelials Urine Few (A) None Seen /LPF    Amorphous Crystals Urine Moderate (A) None Seen /HPF    Narrative    Urine Culture not indicated     ASSESSMENT:    ICD-10-CM    1. Balanitis  N48.1 cephALEXin (KEFLEX) 500 MG capsule     miconazole (MICATIN) 2 % external cream      2. Dysuria  R30.0 UA Macroscopic with reflex to Microscopic and Culture - Clinic Collect     UA Microscopic with Reflex to Culture     Urine Culture Aerobic Bacterial - lab collect     Urine Culture Aerobic Bacterial - lab collect        PLAN:     Keflex and miconazole for balanitis  Urine culture pending, will call if change in antibiotic is needed  Probiotics recommended  Only wash with water open to air at bedtime cotton underwear  Reevaluation in a couple days if worsening symptoms, ER if severe    Follow up with primary care provider with any problems, questions or concerns or if symptoms worsen or fail to improve. Patient agreed to plan and verbalized understanding.    Sheila Ochoa, [facility] C[facility] [place]

## 2024-04-04 LAB — BACTERIA UR CULT: NO GROWTH

## 2024-04-11 ENCOUNTER — OFFICE VISIT (OUTPATIENT)
Dept: URGENT CARE | Facility: URGENT CARE | Age: 59
End: 2024-04-11
Payer: COMMERCIAL

## 2024-04-11 VITALS
BODY MASS INDEX: 26.96 KG/M2 | SYSTOLIC BLOOD PRESSURE: 143 MMHG | RESPIRATION RATE: 18 BRPM | OXYGEN SATURATION: 95 % | WEIGHT: 182 LBS | DIASTOLIC BLOOD PRESSURE: 94 MMHG | HEART RATE: 80 BPM | HEIGHT: 69 IN | TEMPERATURE: 98.6 F

## 2024-04-11 DIAGNOSIS — T78.40XA ALLERGIC REACTION, INITIAL ENCOUNTER: Primary | ICD-10-CM

## 2024-04-11 PROCEDURE — 96372 THER/PROPH/DIAG INJ SC/IM: CPT | Performed by: STUDENT IN AN ORGANIZED HEALTH CARE EDUCATION/TRAINING PROGRAM

## 2024-04-11 PROCEDURE — 99214 OFFICE O/P EST MOD 30 MIN: CPT | Mod: 25 | Performed by: STUDENT IN AN ORGANIZED HEALTH CARE EDUCATION/TRAINING PROGRAM

## 2024-04-11 RX ORDER — METHYLPREDNISOLONE SOD SUCC 125 MG
60 VIAL (EA) INJECTION ONCE
Status: DISCONTINUED | OUTPATIENT
Start: 2024-04-11 | End: 2024-04-11

## 2024-04-11 RX ORDER — METHYLPREDNISOLONE 4 MG
TABLET, DOSE PACK ORAL
Qty: 21 TABLET | Refills: 0 | Status: SHIPPED | OUTPATIENT
Start: 2024-04-11 | End: 2024-08-23

## 2024-04-11 RX ORDER — CETIRIZINE HYDROCHLORIDE 10 MG/1
10 TABLET ORAL 2 TIMES DAILY
Qty: 30 TABLET | Refills: 0 | Status: SHIPPED | OUTPATIENT
Start: 2024-04-11 | End: 2024-09-03

## 2024-04-11 RX ORDER — FAMOTIDINE 20 MG/1
20 TABLET, FILM COATED ORAL 2 TIMES DAILY
Qty: 30 TABLET | Refills: 0 | Status: SHIPPED | OUTPATIENT
Start: 2024-04-11 | End: 2024-08-23

## 2024-04-11 RX ORDER — METHYLPREDNISOLONE SODIUM SUCCINATE 40 MG/ML
80 INJECTION, POWDER, LYOPHILIZED, FOR SOLUTION INTRAMUSCULAR; INTRAVENOUS ONCE
Status: COMPLETED | OUTPATIENT
Start: 2024-04-11 | End: 2024-04-11

## 2024-04-11 RX ADMIN — METHYLPREDNISOLONE SODIUM SUCCINATE 80 MG: 40 INJECTION, POWDER, LYOPHILIZED, FOR SOLUTION INTRAMUSCULAR; INTRAVENOUS at 11:50

## 2024-04-11 NOTE — PATIENT INSTRUCTIONS
Take Medrol Dosepack as directed - start tomorrow morning.  Take Cetirizine and famotidine twice daily.   Call 911 or go to ER if you develop shortness of breath, wheezing, throat/tongue swelling.

## 2024-04-11 NOTE — PROGRESS NOTES
ASSESSMENT & PLAN:   Diagnoses and all orders for this visit:  Allergic reaction, initial encounter  -     methylPREDNISolone (MEDROL DOSEPAK) 4 MG tablet therapy pack; Follow Package Directions  -     cetirizine (ZYRTEC) 10 MG tablet; Take 1 tablet (10 mg) by mouth 2 times daily  -     famotidine (PEPCID) 20 MG tablet; Take 1 tablet (20 mg) by mouth 2 times daily  -     methylPREDNISolone sodium succinate (solu-MEDROL) injection 80 mg    Hives last night, resolved after taking Benadryl. Episode of shortness of breath, cough, chest congestion this morning, resolved after taking Benadryl. Reported continued lip tingling. No clear angioedema on exam. No airway compromise. Clear lung sounds. Vitals stable. Dose of Solumedrol in clinic. Start Medrol dose pack, Zyrtec, and Pepcid. Suspect reaction to Keflex - advised to discontinue this (last dose today, urine culture was negative). Of note, has been on lisinopril for 20+ years with no recent change in dose. Strict ER precautions discussed.    At the end of the encounter, I discussed results, diagnosis, medications. Discussed red flags for immediate return to clinic/ER, as well as indications for follow up if no improvement. Patient and/or caregiver understood and agreed to plan. Patient was stable for discharge.    Patient Instructions   Take Medrol Dosepack as directed - start tomorrow morning.  Take Cetirizine and famotidine twice daily.   Call 911 or go to ER if you develop shortness of breath, wheezing, throat/tongue swelling.       30 minutes spent with the patient doing chart review, review of outside records, review of test results, interpretation of tests, patient visit and documentation.   ------------------------------------------------------------------------  SUBJECTIVE  History was obtained from patient.    Patient presents with:  Allergic Reaction: Last night started having an allergic reaction, hives spreading throughout body   This morning started having  shortness of breath and lip swelling   Urgent Care    HPI  Fabrice Donahue is a(n) 58 year old male presenting to urgent care for allergic reaction. He developed pruritic hives last night. Took Benadryl and hives improved. This morning developed chest congestion, cough, shortness of breath, bilateral eye swelling. Took Benadryl and the symptoms improved. Reports lip tingling. Currently no shortness of breath, wheezing, cough, throat/lip/tongue swelling. He is on day 7/7 of Keflex. Otherwise no new medications. Has been taking lisinopril for 20+ years, no recent dose changes. No new foods. Of note, he had hives last week which were located around his groin and upper thighs. Denies use of new lotions, soaps, or detergents.     Review of Systems    Current Outpatient Medications   Medication Sig Dispense Refill    calcitonin, salmon, (MIACALCIN) 200 UNIT/ACT nasal spray Spray 1 spray into one nostril alternating nostrils daily Alternate nostril each day. 3.7 mL 11    cephALEXin (KEFLEX) 500 MG capsule Take 1 capsule (500 mg) by mouth 4 times daily for 7 days 28 capsule 0    cetirizine (ZYRTEC) 10 MG tablet Take 1 tablet (10 mg) by mouth 2 times daily 30 tablet 0    famotidine (PEPCID) 20 MG tablet Take 1 tablet (20 mg) by mouth 2 times daily 30 tablet 0    lisinopril (ZESTRIL) 10 MG tablet Take 1 tablet (10 mg) by mouth daily 90 tablet 3    methylPREDNISolone (MEDROL DOSEPAK) 4 MG tablet therapy pack Follow Package Directions 21 tablet 0    miconazole (MICATIN) 2 % external cream Apply topically 2 times daily for 10 days 15 g 0    Multiple Vitamin (MULTI-VITAMIN) per tablet Take 1 tablet by mouth daily. 100 tablet 12    pravastatin (PRAVACHOL) 20 MG tablet Take 1 tablet (20 mg) by mouth daily 90 tablet 3    calcium citrate-vitamin D (CALCIUM CITRATE + D3) 315-5 MG-MCG TABS per tablet  (Patient not taking: Reported on 4/11/2024)       Problem List:  2024-03: Prediabetes  2023-08: Midline thoracic back pain  2023-02: High  "serum insulin-like growth factor 1 (IGF-1)  2022-07: Osteoporosis with current pathological fracture with routine   healing, unspecified osteoporosis type, subsequent encounter  2022-07: Hypotestosteronism  2022-07: Erectile dysfunction, unspecified erectile dysfunction type  2022-03: Compression fracture of body of thoracic vertebra (H)  2016-12: Psychophysiologic insomnia  2013-01: Overweight (BMI 25.0-29.9)  2013-01: Hypertension goal BP (blood pressure) < 140/90  2012-09: Insomnia  2010-10: HYPERLIPIDEMIA LDL GOAL <130  2010-10: Prostate nodule  2008-06: Fatty liver  2006-12: Other affections of shoulder region, not elsewhere classified  2006-12: Pain in joint, shoulder region  2004-11: Mixed hyperlipidemia  2004-08: Essential hypertension, benign  2004-08: Mixed hyperlipidemia  Syncope    Allergies   Allergen Reactions    Ivp Dye [Contrast Dye]          OBJECTIVE  Vitals:    04/11/24 1108 04/11/24 1141   BP: (!) 150/99 (!) 143/94   Pulse: 83 80   Resp: 18    Temp: 98.6  F (37  C)    TempSrc: Temporal    SpO2: 94% 95%   Weight: 82.6 kg (182 lb)    Height: 1.753 m (5' 9\")      Physical Exam   GENERAL: healthy, alert, no acute distress.   PSYCH: mentation appears normal. Normal affect  HEAD: normocephalic, atraumatic.  EYE: PERRL. EOMs intact. No scleral injection bilaterally.   NOSE: external nose atraumatic without lesions.  OROPHARYNX: moist mucous membranes. Posterior oropharynx without erythema or exudate. Uvula midline. Patent airway. No angioedema.   LUNGS: no increased work of breathing. Clear lung sounds bilaterally. No wheezing, rhonchi, or rales.   CV: regular rate and rhythm. No clicks, murmurs, or rubs.  SKIN: no rashes or lesions.    No results found for any visits on 04/11/24.  "

## 2024-04-12 ENCOUNTER — MYC MEDICAL ADVICE (OUTPATIENT)
Dept: FAMILY MEDICINE | Facility: CLINIC | Age: 59
End: 2024-04-12
Payer: COMMERCIAL

## 2024-04-12 NOTE — TELEPHONE ENCOUNTER
Sarah: added allergy; routing for awareness and may close encounter.  Writer responded via MyChart.     I had a sever allergic reaction including swelling of the lips, mouth, coughing and difficulty breathing.  The nurse practitioner suspects the Cephalexin I was taking for an infection. I was treated in Urgent Care yesterday and am feeling a lot better today.     However, could you make sure that you add Cephalexin to my list of allergies in MyChart? I want to be sure it's noted in my medical record.    Nicole FRANCES RN  [facility]

## 2024-04-15 ENCOUNTER — TELEPHONE (OUTPATIENT)
Dept: URGENT CARE | Facility: URGENT CARE | Age: 59
End: 2024-04-15
Payer: COMMERCIAL

## 2024-04-16 NOTE — TELEPHONE ENCOUNTER
Please let Fabrice know that his insurance is not covering the famotidine because he can purchase it over the counter. So I recommend purchasing it OTC (or he can pay OOP for what the pharmacy charges).  Sarah Rdz PA-C

## 2024-04-16 NOTE — TELEPHONE ENCOUNTER
PRIOR AUTHORIZATION DENIED    Medication: FAMOTIDINE 20 MG PO TABS  Insurance Company: [employer] [place] - Phone [phone] Fax [phone]  Denial Date: 4/15/2024  Denial Reason(s):     Appeal Information:     Patient Notified: No

## 2024-07-09 ENCOUNTER — PATIENT OUTREACH (OUTPATIENT)
Dept: CARE COORDINATION | Facility: CLINIC | Age: 59
End: 2024-07-09
Payer: COMMERCIAL

## 2024-07-15 ENCOUNTER — MYC MEDICAL ADVICE (OUTPATIENT)
Dept: FAMILY MEDICINE | Facility: CLINIC | Age: 59
End: 2024-07-15
Payer: COMMERCIAL

## 2024-07-15 DIAGNOSIS — Z00.00 ROUTINE ADULT HEALTH MAINTENANCE: Primary | ICD-10-CM

## 2024-07-17 ENCOUNTER — MYC MEDICAL ADVICE (OUTPATIENT)
Dept: FAMILY MEDICINE | Facility: CLINIC | Age: 59
End: 2024-07-17
Payer: COMMERCIAL

## 2024-07-19 NOTE — TELEPHONE ENCOUNTER
Sarah Rdz --  Please review and advise: see Ziniohart message please.     EMILY MariN RN  [facility]

## 2024-07-23 NOTE — TELEPHONE ENCOUNTER
"Sarah-Please review patient's response and may close encounter.    \"Sounds good.  I have exercised hard under supervision of my wife in case I started to show similar signs as mentioned above.  My BP after a race on Sunday was 88/62 and 15 minutes later was 109/75.  Seems to have rebounded nicely.  I will try to hydrate in advance to try and avoid dehydration.     Thanks,  Fabrice\"    Thank you!  EMILY DysonN, RN-[facility]    "

## 2024-08-19 SDOH — HEALTH STABILITY: PHYSICAL HEALTH: ON AVERAGE, HOW MANY MINUTES DO YOU ENGAGE IN EXERCISE AT THIS LEVEL?: 40 MIN

## 2024-08-19 SDOH — HEALTH STABILITY: PHYSICAL HEALTH: ON AVERAGE, HOW MANY DAYS PER WEEK DO YOU ENGAGE IN MODERATE TO STRENUOUS EXERCISE (LIKE A BRISK WALK)?: 4 DAYS

## 2024-08-19 ASSESSMENT — SOCIAL DETERMINANTS OF HEALTH (SDOH): HOW OFTEN DO YOU GET TOGETHER WITH FRIENDS OR RELATIVES?: ONCE A WEEK

## 2024-08-20 ENCOUNTER — LAB (OUTPATIENT)
Dept: LAB | Facility: CLINIC | Age: 59
End: 2024-08-20
Payer: COMMERCIAL

## 2024-08-20 DIAGNOSIS — R53.82 CHRONIC FATIGUE: ICD-10-CM

## 2024-08-20 DIAGNOSIS — R73.03 PREDIABETES: ICD-10-CM

## 2024-08-20 DIAGNOSIS — E29.1 MALE HYPOGONADISM: ICD-10-CM

## 2024-08-20 DIAGNOSIS — Z00.00 ROUTINE ADULT HEALTH MAINTENANCE: ICD-10-CM

## 2024-08-20 DIAGNOSIS — E78.5 HYPERLIPIDEMIA LDL GOAL <130: ICD-10-CM

## 2024-08-20 LAB
ANION GAP SERPL CALCULATED.3IONS-SCNC: 10 MMOL/L (ref 7–15)
BUN SERPL-MCNC: 11.2 MG/DL (ref 8–23)
CALCIUM SERPL-MCNC: 10.2 MG/DL (ref 8.8–10.4)
CHLORIDE SERPL-SCNC: 102 MMOL/L (ref 98–107)
CHOLEST SERPL-MCNC: 167 MG/DL
CREAT SERPL-MCNC: 0.78 MG/DL (ref 0.67–1.17)
EGFRCR SERPLBLD CKD-EPI 2021: >90 ML/MIN/1.73M2
ERYTHROCYTE [DISTWIDTH] IN BLOOD BY AUTOMATED COUNT: 12.6 % (ref 10–15)
FASTING STATUS PATIENT QL REPORTED: YES
FASTING STATUS PATIENT QL REPORTED: YES
FSH SERPL IRP2-ACNC: 15.6 MIU/ML (ref 1.5–12.4)
GLUCOSE SERPL-MCNC: 114 MG/DL (ref 70–99)
HBA1C MFR BLD: 6.1 % (ref 0–5.6)
HCO3 SERPL-SCNC: 26 MMOL/L (ref 22–29)
HCT VFR BLD AUTO: 46.2 % (ref 40–53)
HDLC SERPL-MCNC: 23 MG/DL
HGB BLD-MCNC: 15.4 G/DL (ref 13.3–17.7)
LDLC SERPL CALC-MCNC: 80 MG/DL
LH SERPL-ACNC: 11.1 MIU/ML (ref 1.7–8.6)
MCH RBC QN AUTO: 29.9 PG (ref 26.5–33)
MCHC RBC AUTO-ENTMCNC: 33.3 G/DL (ref 31.5–36.5)
MCV RBC AUTO: 90 FL (ref 78–100)
NONHDLC SERPL-MCNC: 144 MG/DL
PLATELET # BLD AUTO: 240 10E3/UL (ref 150–450)
POTASSIUM SERPL-SCNC: 4.1 MMOL/L (ref 3.4–5.3)
RBC # BLD AUTO: 5.15 10E6/UL (ref 4.4–5.9)
SHBG SERPL-SCNC: 41 NMOL/L (ref 11–80)
SODIUM SERPL-SCNC: 138 MMOL/L (ref 135–145)
TRIGL SERPL-MCNC: 321 MG/DL
WBC # BLD AUTO: 9.8 10E3/UL (ref 4–11)

## 2024-08-20 PROCEDURE — 83002 ASSAY OF GONADOTROPIN (LH): CPT

## 2024-08-20 PROCEDURE — 84403 ASSAY OF TOTAL TESTOSTERONE: CPT

## 2024-08-20 PROCEDURE — 82248 BILIRUBIN DIRECT: CPT

## 2024-08-20 PROCEDURE — 83001 ASSAY OF GONADOTROPIN (FSH): CPT

## 2024-08-20 PROCEDURE — 80053 COMPREHEN METABOLIC PANEL: CPT

## 2024-08-20 PROCEDURE — 84443 ASSAY THYROID STIM HORMONE: CPT

## 2024-08-20 PROCEDURE — 83036 HEMOGLOBIN GLYCOSYLATED A1C: CPT

## 2024-08-20 PROCEDURE — 80061 LIPID PANEL: CPT

## 2024-08-20 PROCEDURE — 84270 ASSAY OF SEX HORMONE GLOBUL: CPT

## 2024-08-20 PROCEDURE — 36415 COLL VENOUS BLD VENIPUNCTURE: CPT

## 2024-08-20 PROCEDURE — 85027 COMPLETE CBC AUTOMATED: CPT

## 2024-08-22 LAB
TESTOST FREE SERPL-MCNC: 5.69 NG/DL
TESTOST SERPL-MCNC: 328 NG/DL (ref 240–950)

## 2024-08-22 NOTE — RESULT ENCOUNTER NOTE
The testosterone level is slightly higher.  We are going to discuss in detail at your upcoming appointment.

## 2024-08-23 ENCOUNTER — OFFICE VISIT (OUTPATIENT)
Dept: FAMILY MEDICINE | Facility: CLINIC | Age: 59
End: 2024-08-23
Payer: COMMERCIAL

## 2024-08-23 ENCOUNTER — MYC MEDICAL ADVICE (OUTPATIENT)
Dept: FAMILY MEDICINE | Facility: CLINIC | Age: 59
End: 2024-08-23

## 2024-08-23 VITALS
HEART RATE: 68 BPM | DIASTOLIC BLOOD PRESSURE: 82 MMHG | OXYGEN SATURATION: 96 % | HEIGHT: 69 IN | RESPIRATION RATE: 16 BRPM | SYSTOLIC BLOOD PRESSURE: 124 MMHG | TEMPERATURE: 97.2 F | BODY MASS INDEX: 26.05 KG/M2 | WEIGHT: 175.9 LBS

## 2024-08-23 DIAGNOSIS — I10 HYPERTENSION GOAL BP (BLOOD PRESSURE) < 140/90: ICD-10-CM

## 2024-08-23 DIAGNOSIS — Z00.00 ROUTINE GENERAL MEDICAL EXAMINATION AT A HEALTH CARE FACILITY: Primary | ICD-10-CM

## 2024-08-23 DIAGNOSIS — R73.03 PREDIABETES: ICD-10-CM

## 2024-08-23 DIAGNOSIS — R55 PRE-SYNCOPE: ICD-10-CM

## 2024-08-23 DIAGNOSIS — Z12.11 SCREEN FOR COLON CANCER: ICD-10-CM

## 2024-08-23 DIAGNOSIS — R53.82 CHRONIC FATIGUE: ICD-10-CM

## 2024-08-23 DIAGNOSIS — E78.5 HYPERLIPIDEMIA LDL GOAL <130: ICD-10-CM

## 2024-08-23 PROBLEM — R19.7 DIARRHEA: Status: ACTIVE | Noted: 2024-03-01

## 2024-08-23 LAB
ALBUMIN SERPL BCG-MCNC: 4.5 G/DL (ref 3.5–5.2)
ALP SERPL-CCNC: 79 U/L (ref 40–150)
ALT SERPL W P-5'-P-CCNC: 52 U/L (ref 0–70)
AST SERPL W P-5'-P-CCNC: 36 U/L (ref 0–45)
BILIRUB DIRECT SERPL-MCNC: <0.2 MG/DL (ref 0–0.3)
BILIRUB SERPL-MCNC: 0.4 MG/DL
PROT SERPL-MCNC: 7.3 G/DL (ref 6.4–8.3)
TSH SERPL DL<=0.005 MIU/L-ACNC: 2.26 UIU/ML (ref 0.3–4.2)

## 2024-08-23 PROCEDURE — 99214 OFFICE O/P EST MOD 30 MIN: CPT | Mod: 25 | Performed by: PHYSICIAN ASSISTANT

## 2024-08-23 PROCEDURE — 99396 PREV VISIT EST AGE 40-64: CPT | Performed by: PHYSICIAN ASSISTANT

## 2024-08-23 RX ORDER — METFORMIN HCL 500 MG
500 TABLET, EXTENDED RELEASE 24 HR ORAL
Qty: 90 TABLET | Refills: 1 | Status: SHIPPED | OUTPATIENT
Start: 2024-08-23

## 2024-08-23 RX ORDER — PRAVASTATIN SODIUM 20 MG
20 TABLET ORAL DAILY
Qty: 90 TABLET | Refills: 3 | Status: SHIPPED | OUTPATIENT
Start: 2024-08-23 | End: 2024-10-01

## 2024-08-23 RX ORDER — TRAZODONE HYDROCHLORIDE 50 MG/1
25-50 TABLET, FILM COATED ORAL AT BEDTIME
Qty: 90 TABLET | Refills: 3 | Status: SHIPPED | OUTPATIENT
Start: 2024-08-23

## 2024-08-23 RX ORDER — LISINOPRIL 10 MG/1
10 TABLET ORAL DAILY
Qty: 90 TABLET | Refills: 3 | Status: SHIPPED | OUTPATIENT
Start: 2024-08-23

## 2024-08-23 ASSESSMENT — PAIN SCALES - GENERAL: PAINLEVEL: NO PAIN (0)

## 2024-08-23 NOTE — PROGRESS NOTES
"Preventive Care Visit  [facility]  Sarah Rdz PA-C, Physician Assistant - Medical  Aug 23, 2024      Assessment & Plan     Routine general medical examination at a health care facility    Pre-syncope - recommend cardiology consult for thorough evaluation  - Adult Cardiology Eval  Referral    Prediabetes - discussed options for management, will start low dose metformin. Follow up in 3 months for repeat A1C, lab only.  - metFORMIN (GLUCOPHAGE XR) 500 MG 24 hr tablet  Dispense: 90 tablet; Refill: 1  - **Hemoglobin A1c FUTURE 3mo    Hyperlipidemia LDL goal <130 - refills provided  - pravastatin (PRAVACHOL) 20 MG tablet  Dispense: 90 tablet; Refill: 3  - Hepatic panel (Albumin, ALT, AST, Bili, Alk Phos, TP)    Hypertension goal BP (blood pressure) < 140/90 - at goal, refills provided  - lisinopril (ZESTRIL) 10 MG tablet  Dispense: 90 tablet; Refill: 3    Chronic fatigue - will add on TSH to labs, although has been normal so low suspicion of this being an issue. He does see endocrinology for hypogonadism, which could be primary cause. Did provide trazodone to help with staying asleep, OK to use PRN.  - TSH with free T4 reflex  - traZODone (DESYREL) 50 MG tablet  Dispense: 90 tablet; Refill: 3    Screen for colon cancer  - Colonoscopy Screening  Referral      BMI  Estimated body mass index is 25.98 kg/m  as calculated from the following:    Height as of this encounter: 1.753 m (5' 9\").    Weight as of this encounter: 79.8 kg (175 lb 14.4 oz).     Counseling  Appropriate preventive services were addressed with this patient via screening, questionnaire, or discussion as appropriate for fall prevention, nutrition, physical activity, Tobacco-use cessation, social engagement, weight loss and cognition.  Checklist reviewing preventive services available has been given to the patient.  Reviewed patient's diet, addressing concerns and/or questions.         Latrice Avina is a 58 " year old, presenting for the following:  Physical (Pt wants to discuss about Fatigue, A1c, ED, hot flashes, low sex drive. )        8/23/2024     9:03 AM   Additional Questions   Roomed by Rosalind mccrary   Accompanied by Sekou Avina here today for [facility] visit    #Hypogonadism  Concern with continuity of care  Having fatigue, decreased muscle mass, decreased endurance, decreased libido, erectile dysfunction   Started on T with PCP while waiting for getting back in with endo  Upcoming appt 9/3    #presyncope  When pushing really hard on a cycling race notices Bp drops and will get lightheaded/presyncopal   Improves when he backs off on intensity  Down to 100/60 or 90/50  Ongoing for years    #Diarrhea  Chronic issue, on and off  No hematochezia  Seems to associate it with too many sweets (notices it most with ice cream), energy drinks that have sugar in them    #Fatigue:  Significant, wakes up for 6AM meetings  Falls asleep easily  Sleeps lightly, falls back asleep easily    Health Care Directive  Patient has a Health Care Directive on file  Advance care planning document is on file and is current.    HPI      8/19/2024   General Health   How would you rate your overall physical health? Good   Feel stress (tense, anxious, or unable to sleep) Rather much      (!) STRESS CONCERN      8/19/2024   Nutrition   Three or more servings of calcium each day? (!) NO   Diet: Regular (no restrictions)   How many servings of fruit and vegetables per day? (!) 2-3   How many sweetened beverages each day? 0-1          8/19/2024   Exercise   Days per week of moderate/strenous exercise 4 days   Average minutes spent exercising at this level 40 min          8/19/2024   Social Factors   Frequency of gathering with friends or relatives Once a week   Worry food won't last until get money to buy more No   Food not last or not have enough money for food? No   Do you have housing? (Housing is defined as stable permanent housing and does not  include staying ouside in a car, in a tent, in an abandoned building, in an overnight shelter, or couch-surfing.) Yes   Are you worried about losing your housing? No   Lack of transportation? No   Unable to get utilities (heat,electricity)? No          2024   Fall Risk   Fallen 2 or more times in the past year? No   Trouble with walking or balance? No             2024   Dental   Dentist two times every year? Yes          2024   TB Screening   Were you born outside of the [place]? No          Today's PHQ-2 Score:       2024     9:42 AM   PHQ-2 (  [employer])   Q1: Little interest or pleasure in doing things 0   Q2: Feeling down, depressed or hopeless 0   PHQ-2 Score 0         2024   Substance Use   Alcohol more than 3/day or more than 7/wk No   Do you use any other substances recreationally? No        Social History     Tobacco Use    Smoking status: Former     Current packs/day: 0.00     Average packs/day: 0.5 packs/day for 5.0 years (2.5 ttl pk-yrs)     Types: Cigarettes     Start date: 10/1/2005     Quit date: 10/1/2010     Years since quittin.9    Smokeless tobacco: Never    Tobacco comments:     Already quit smoking   Vaping Use    Vaping status: Never Used   Substance Use Topics    Alcohol use: Yes     Comment: Less than 1 drink a week; occasional beer    Drug use: No           2024   STI Screening   New sexual partner(s) since last STI/HIV test? No      Last PSA:   PSA   Date Value Ref Range Status   2020 0.51 0 - 4 ug/L Final     Comment:     Assay Method:  Chemiluminescence using Siemens Vista analyzer     PSA Tumor Marker   Date Value Ref Range Status   02/15/2023 0.77 0.00 - 3.50 ng/mL Final   2022 0.48 0.00 - 4.00 ug/L Final     ASCVD Risk   The 10-year ASCVD risk score (John TAYLOR, et al., 2019) is: 14%    Values used to calculate the score:      Age: 58 years      Sex: Male      Is Non- : No      Diabetic: No      Tobacco smoker:  "No      Systolic Blood Pressure: 124 mmHg      Is BP treated: Yes      HDL Cholesterol: 23 mg/dL      Total Cholesterol: 167 mg/dL         Reviewed and updated as needed this visit by Provider   Tobacco  Allergies  Meds  Problems  Med Hx  Surg Hx  Fam Hx                 Objective    Exam  /82 (BP Location: Right arm, Patient Position: Sitting, Cuff Size: Adult Regular)   Pulse 68   Temp 97.2  F (36.2  C) (Temporal)   Resp 16   Ht 1.753 m (5' 9\")   Wt 79.8 kg (175 lb 14.4 oz)   SpO2 96%   BMI 25.98 kg/m     Estimated body mass index is 25.98 kg/m  as calculated from the following:    Height as of this encounter: 1.753 m (5' 9\").    Weight as of this encounter: 79.8 kg (175 lb 14.4 oz).    Physical Exam  GENERAL: alert and no distress  EYES: Eyes grossly normal to inspection, PERRL and conjunctivae and sclerae normal  HENT: ear canals and TM's normal, nose and mouth without ulcers or lesions  NECK: no adenopathy, no asymmetry, masses, or scars  RESP: lungs clear to auscultation - no rales, rhonchi or wheezes  CV: regular rate and rhythm, normal S1 S2, no S3 or S4, no murmur, click or rub, no peripheral edema  ABDOMEN: soft, nontender, no hepatosplenomegaly, no masses and bowel sounds normal  MS: no gross musculoskeletal defects noted, no edema  SKIN: no suspicious lesions or rashes  NEURO: Normal strength and tone, mentation intact and speech normal  PSYCH: mentation appears normal, affect normal/bright        Signed Electronically by: Sarah Rdz PA-C    "

## 2024-08-23 NOTE — PATIENT INSTRUCTIONS
Patient Education   Preventive Care Advice   This is general advice given by our system to help you stay healthy. However, your care team may have specific advice just for you. Please talk to your care team about your preventive care needs.  Nutrition  Eat 5 or more servings of fruits and vegetables each day.  Try wheat bread, brown rice and whole grain pasta (instead of white bread, rice, and pasta).  Get enough calcium and vitamin D. Check the label on foods and aim for 100% of the RDA (recommended daily allowance).  Lifestyle  Exercise at least 150 minutes each week  (30 minutes a day, 5 days a week).  Do muscle strengthening activities 2 days a week. These help control your weight and prevent disease.  No smoking.  Wear sunscreen to prevent skin cancer.  Have a dental exam and cleaning every 6 months.  Yearly exams  See your health care team every year to talk about:  Any changes in your health.  Any medicines your care team has prescribed.  Preventive care, family planning, and ways to prevent chronic diseases.  Shots (vaccines)   HPV shots (up to age 26), if you've never had them before.  Hepatitis B shots (up to age 59), if you've never had them before.  COVID-19 shot: Get this shot when it's due.  Flu shot: Get a flu shot every year.  Tetanus shot: Get a tetanus shot every 10 years.  Pneumococcal, hepatitis A, and RSV shots: Ask your care team if you need these based on your risk.  Shingles shot (for age 50 and up)  General health tests  Diabetes screening:  Starting at age 35, Get screened for diabetes at least every 3 years.  If you are younger than age 35, ask your care team if you should be screened for diabetes.  Cholesterol test: At age 39, start having a cholesterol test every 5 years, or more often if advised.  Bone density scan (DEXA): At age 50, ask your care team if you should have this scan for osteoporosis (brittle bones).  Hepatitis C: Get tested at least once in your life.  STIs (sexually  transmitted infections)  Before age 24: Ask your care team if you should be screened for STIs.  After age 24: Get screened for STIs if you're at risk. You are at risk for STIs (including HIV) if:  You are sexually active with more than one person.  You don't use condoms every time.  You or a partner was diagnosed with a sexually transmitted infection.  If you are at risk for HIV, ask about PrEP medicine to prevent HIV.  Get tested for HIV at least once in your life, whether you are at risk for HIV or not.  Cancer screening tests  Cervical cancer screening: If you have a cervix, begin getting regular cervical cancer screening tests starting at age 21.  Breast cancer scan (mammogram): If you've ever had breasts, begin having regular mammograms starting at age 40. This is a scan to check for breast cancer.  Colon cancer screening: It is important to start screening for colon cancer at age 45.  Have a colonoscopy test every 10 years (or more often if you're at risk) Or, ask your provider about stool tests like a FIT test every year or Cologuard test every 3 years.  To learn more about your testing options, visit:   .  For help making a decision, visit:   https://bit.ly/ff06701.  Prostate cancer screening test: If you have a prostate, ask your care team if a prostate cancer screening test (PSA) at age 55 is right for you.  Lung cancer screening: If you are a current or former smoker ages 50 to 80, ask your care team if ongoing lung cancer screenings are right for you.  For informational purposes only. Not to replace the advice of your health care provider. Copyright   2023 [facility]. All rights reserved. Clinically reviewed by the [facility]itions Program. [employer] 066631 - REV 01/24.  Learning About Stress  What is stress?     Stress is your body's response to a hard situation. Your body can have a physical, emotional, or mental response. Stress is a fact of life for most people, and it  affects everyone differently. What causes stress for you may not be stressful for someone else.  A lot of things can cause stress. You may feel stress when you go on a job interview, take a test, or run a race. This kind of short-term stress is normal and even useful. It can help you if you need to work hard or react quickly. For example, stress can help you finish an important job on time.  Long-term stress is caused by ongoing stressful situations or events. Examples of long-term stress include long-term health problems, ongoing problems at work, or conflicts in your family. Long-term stress can harm your health.  How does stress affect your health?  When you are stressed, your body responds as though you are in danger. It makes hormones that speed up your heart, make you breathe faster, and give you a burst of energy. This is called the fight-or-flight stress response. If the stress is over quickly, your body goes back to normal and no harm is done.  But if stress happens too often or lasts too long, it can have bad effects. Long-term stress can make you more likely to get sick, and it can make symptoms of some diseases worse. If you tense up when you are stressed, you may develop neck, shoulder, or low back pain. Stress is linked to high blood pressure and heart disease.  Stress also harms your emotional health. It can make you martin, tense, or depressed. Your relationships may suffer, and you may not do well at work or school.  What can you do to manage stress?  You can try these things to help manage stress:   Do something active. Exercise or activity can help reduce stress. Walking is a great way to get started. Even everyday activities such as housecleaning or yard work can help.  Try yoga or deena chi. These techniques combine exercise and meditation. You may need some training at first to learn them.  Do something you enjoy. For example, listen to music or go to a movie. Practice your hobby or do volunteer  "work.  Meditate. This can help you relax, because you are not worrying about what happened before or what may happen in the future.  Do guided imagery. Imagine yourself in any setting that helps you feel calm. You can use online videos, books, or a teacher to guide you.  Do breathing exercises. For example:  From a standing position, bend forward from the waist with your knees slightly bent. Let your arms dangle close to the floor.  Breathe in slowly and deeply as you return to a standing position. Roll up slowly and lift your head last.  Hold your breath for just a few seconds in the standing position.  Breathe out slowly and bend forward from the waist.  Let your feelings out. Talk, laugh, cry, and express anger when you need to. Talking with supportive friends or family, a counselor, or a mayco leader about your feelings is a healthy way to relieve stress. Avoid discussing your feelings with people who make you feel worse.  Write. It may help to write about things that are bothering you. This helps you find out how much stress you feel and what is causing it. When you know this, you can find better ways to cope.  What can you do to prevent stress?  You might try some of these things to help prevent stress:  Manage your time. This helps you find time to do the things you want and need to do.  Get enough sleep. Your body recovers from the stresses of the day while you are sleeping.  Get support. Your family, friends, and community can make a difference in how you experience stress.  Limit your news feed. Avoid or limit time on social media or news that may make you feel stressed.  Do something active. Exercise or activity can help reduce stress. Walking is a great way to get started.  Where can you learn more?  Go to https://www.healthwise.net/patiented  Enter N032 in the search box to learn more about \"Learning About Stress.\"  Current as of: October 24, 2023               Content Version: 14.0    6650-5777 " Healthwise, [employer].   Care instructions adapted under license by your healthcare professional. If you have questions about a medical condition or this instruction, always ask your healthcare professional. Healthwise[employer] disclaims any warranty or liability for your use of this information.

## 2024-08-26 NOTE — TELEPHONE ENCOUNTER
Writer replied to patient via [employer].  EMILY SamuelsN, RN (she/her)  [facility] RN

## 2024-08-27 ENCOUNTER — TELEPHONE (OUTPATIENT)
Dept: GASTROENTEROLOGY | Facility: CLINIC | Age: 59
End: 2024-08-27

## 2024-08-27 NOTE — TELEPHONE ENCOUNTER
"Endoscopy Scheduling Screen    Have you had a positive Covid test in the last 14 days?  No    What is your communication preference for Instructions and/or Bowel Prep?   MyChart    What insurance is in the chart?  Other:  BC[employer]    Ordering/Referring Provider: Kendell   (If ordering provider performs procedure, schedule with ordering provider unless otherwise instructed. )    BMI: Estimated body mass index is 25.98 kg/m  as calculated from the following:    Height as of 8/23/24: 1.753 m (5' 9\").    Weight as of 8/23/24: 79.8 kg (175 lb 14.4 oz).     Sedation Ordered  moderate sedation.   If patient BMI > 50 do not schedule in [facility].    If patient BMI > 45 do not schedule at [facility].    Are you taking methadone or Suboxone?  No    Have you had difficulties, pain, or discomfort during past endoscopy procedures?  No    Are you taking any prescription medications for pain 3 or more times per week?   NO, No RN review required.    Do you have a history of malignant hyperthermia?  No    (Females) Are you currently pregnant?        Have you been diagnosed or told you have pulmonary hypertension?   No    Do you have an LVAD?  No    Have you been told you have moderate to severe sleep apnea?  No    Have you been told you have COPD, asthma, or any other lung disease?  No    Do you have any heart conditions?  No     Have you ever had or are you waiting for an organ transplant?  No. Continue scheduling, no site restrictions.    Have you had a stroke or transient ischemic attack (TIA aka \"mini stroke\" in the last 6 months?   No    Have you been diagnosed with or been told you have cirrhosis of the liver?   No    Are you currently on dialysis?   No    Do you need assistance transferring?   No    BMI: Estimated body mass index is 25.98 kg/m  as calculated from the following:    Height as of 8/23/24: 1.753 m (5' 9\").    Weight as of 8/23/24: 79.8 kg (175 lb 14.4 oz).     Is patients BMI > 40 and scheduling location [facility]?  No    Do you take " an injectable medication for weight loss or diabetes (excluding insulin)?  No    Do you take the medication Naltrexone?  No    Do you take blood thinners?  No       Prep   Are you currently on dialysis or do you have chronic kidney disease?  No    Do you have a diagnosis of diabetes?  No-pre diabetes    Do you have a diagnosis of cystic fibrosis (CF)?  No    On a regular basis do you go 3 -5 days between bowel movements?  No    BMI > 40?  No    Preferred Pharmacy:    [facility] - [place], [place] - [address]  [address]  [place] [address]  Phone: [phone] Fax: [phone]      Final Scheduling Details     Procedure scheduled  Colonoscopy    Surgeon:       Date of procedure:  12/11/24     Pre-OP / PAC:   No - Not required for this site.    Location  [facility] - [facility] - Patient preference.    Sedation   Moderate Sedation - Per order.      Patient Reminders:   You will receive a call from a Nurse to review instructions and health history.  This assessment must be completed prior to your procedure.  Failure to complete the Nurse assessment may result in the procedure being cancelled.      On the day of your procedure, please designate an adult(s) who can drive you home stay with you for the next 24 hours. The medicines used in the exam will make you sleepy. You will not be able to drive.      You cannot take public transportation, ride share services, or non-medical taxi service without a responsible caregiver.  Medical transport services are allowed with the requirement that a responsible caregiver will receive you at your destination.  We require that drivers and caregivers are confirmed prior to your procedure.

## 2024-08-29 ENCOUNTER — ANCILLARY PROCEDURE (OUTPATIENT)
Dept: BONE DENSITY | Facility: CLINIC | Age: 59
End: 2024-08-29
Attending: INTERNAL MEDICINE
Payer: COMMERCIAL

## 2024-08-29 PROCEDURE — 77080 DXA BONE DENSITY AXIAL: CPT | Mod: TC | Performed by: PHYSICIAN ASSISTANT

## 2024-08-29 PROCEDURE — 77081 DXA BONE DENSITY APPENDICULR: CPT | Mod: TC | Performed by: PHYSICIAN ASSISTANT

## 2024-09-03 ENCOUNTER — TELEPHONE (OUTPATIENT)
Dept: ENDOCRINOLOGY | Facility: CLINIC | Age: 59
End: 2024-09-03

## 2024-09-03 ENCOUNTER — VIRTUAL VISIT (OUTPATIENT)
Dept: ENDOCRINOLOGY | Facility: CLINIC | Age: 59
End: 2024-09-03
Payer: COMMERCIAL

## 2024-09-03 DIAGNOSIS — M85.852 OSTEOPENIA OF BOTH HIPS: Primary | ICD-10-CM

## 2024-09-03 DIAGNOSIS — R79.89 LOW TESTOSTERONE: ICD-10-CM

## 2024-09-03 DIAGNOSIS — R35.89 POLYURIA: ICD-10-CM

## 2024-09-03 DIAGNOSIS — M85.851 OSTEOPENIA OF BOTH HIPS: Primary | ICD-10-CM

## 2024-09-03 DIAGNOSIS — R73.03 PREDIABETES: ICD-10-CM

## 2024-09-03 DIAGNOSIS — R79.89 HIGH SERUM INSULIN-LIKE GROWTH FACTOR 1 (IGF-1): ICD-10-CM

## 2024-09-03 DIAGNOSIS — R73.03 PREDIABETES: Primary | ICD-10-CM

## 2024-09-03 PROCEDURE — 99215 OFFICE O/P EST HI 40 MIN: CPT | Mod: 95 | Performed by: INTERNAL MEDICINE

## 2024-09-03 PROCEDURE — G2211 COMPLEX E/M VISIT ADD ON: HCPCS | Mod: 95 | Performed by: INTERNAL MEDICINE

## 2024-09-03 RX ORDER — TESTOSTERONE 1.62 MG/G
2 GEL TRANSDERMAL DAILY
Qty: 75 G | Refills: 3 | Status: SHIPPED | OUTPATIENT
Start: 2024-09-03
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
## 2024-09-03 NOTE — TELEPHONE ENCOUNTER
Left Voicemail (1st Attempt) for the patient to call back and schedule the following:    Appointment type: return  Provider: dr. childers  Return date: 12/3/2024   Specialty phone number: [phone]   Additonal Notes: Return in about 3 months (around 12/3/2024) for fasting morning labs, labs prior to f/up apt.     [facility]   Orthopedics, Podiatry, Sports Medicine, Ent ,Eye , Audiology, Adult Endocrine & Diabetes, Nutrition & Medication Therapy Management Specialties   [facility]

## 2024-09-04 ENCOUNTER — OFFICE VISIT (OUTPATIENT)
Dept: CARDIOLOGY | Facility: CLINIC | Age: 59
End: 2024-09-04
Payer: COMMERCIAL

## 2024-09-04 ENCOUNTER — LAB (OUTPATIENT)
Dept: LAB | Facility: CLINIC | Age: 59
End: 2024-09-04
Payer: COMMERCIAL

## 2024-09-04 VITALS
DIASTOLIC BLOOD PRESSURE: 80 MMHG | RESPIRATION RATE: 16 BRPM | WEIGHT: 173 LBS | HEIGHT: 69 IN | HEART RATE: 72 BPM | SYSTOLIC BLOOD PRESSURE: 124 MMHG | BODY MASS INDEX: 25.62 KG/M2

## 2024-09-04 DIAGNOSIS — R73.03 PREDIABETES: ICD-10-CM

## 2024-09-04 DIAGNOSIS — R79.89 HIGH SERUM INSULIN-LIKE GROWTH FACTOR 1 (IGF-1): ICD-10-CM

## 2024-09-04 DIAGNOSIS — E78.2 MIXED HYPERLIPIDEMIA: ICD-10-CM

## 2024-09-04 DIAGNOSIS — R79.89 LOW TESTOSTERONE: ICD-10-CM

## 2024-09-04 DIAGNOSIS — I10 HYPERTENSION GOAL BP (BLOOD PRESSURE) < 140/90: ICD-10-CM

## 2024-09-04 DIAGNOSIS — R55 PRE-SYNCOPE: Primary | ICD-10-CM

## 2024-09-04 LAB
FASTING STATUS PATIENT QL REPORTED: YES
GLUCOSE SERPL-MCNC: 98 MG/DL (ref 70–99)
SHBG SERPL-SCNC: 32 NMOL/L (ref 11–80)

## 2024-09-04 PROCEDURE — 86341 ISLET CELL ANTIBODY: CPT | Mod: 90

## 2024-09-04 PROCEDURE — 84270 ASSAY OF SEX HORMONE GLOBUL: CPT

## 2024-09-04 PROCEDURE — 99000 SPECIMEN HANDLING OFFICE-LAB: CPT

## 2024-09-04 PROCEDURE — 84403 ASSAY OF TOTAL TESTOSTERONE: CPT

## 2024-09-04 PROCEDURE — 82947 ASSAY GLUCOSE BLOOD QUANT: CPT

## 2024-09-04 PROCEDURE — 36415 COLL VENOUS BLD VENIPUNCTURE: CPT

## 2024-09-04 PROCEDURE — 84305 ASSAY OF SOMATOMEDIN: CPT

## 2024-09-04 PROCEDURE — 84681 ASSAY OF C-PEPTIDE: CPT

## 2024-09-04 PROCEDURE — 99204 OFFICE O/P NEW MOD 45 MIN: CPT | Performed by: INTERNAL MEDICINE

## 2024-09-04 NOTE — LETTER
9/4/2024    Sarah Rdz PA-C  [address]  [place] [address]    RE: Fabrice Donahue       Dear Colleague,     I had the pleasure of seeing Fabrice Donahue in the [facility].      Thank you, Sarah Rdz PA-C, for asking the [facility] team to see Mr. Fabrice Donahue to evaluate exertional near syncope.    Assessment/Recommendations   Assessment:    1.  Exertional near syncope, etiology unclear.  Patient describes onset of nausea and lightheadedness with higher levels of activity which then lead to a feeling of near syncope.  Has not had rosi syncope.  Unclear whether this could be vasovagal type mechanism or due to occult coronary artery disease.  He denies any clear symptoms of chest discomfort or shortness of breath but does have risk factors of hypertension, hypercholesterolemia and prediabetes.  Advised stress echo study to exclude ischemic cause.  We also talked about the importance of maintaining adequate hydration given the level of exercise he is doing.  2.  Essential hypertension, well-controlled  3.  Mixed hyperlipidemia, currently on pravastatin with recent LDL of 80.  Given prediabetes, may want to increase pravastatin to 40 mg daily to try to get his LDL below 70.  This is the goal we shoot for in diabetics or people with known coronary disease.  4.  Prediabetes, recently started on low-dose metformin    Plan:  1.  Continue current medications  2.  Arrange stress echo study with further recommendations to follow       History of Present Illness    Mr. Fabrice Donahue is a 58 year old male with history of essential hypertension, mixed hyperlipidemia, recent diagnosis of prediabetes and low testosterone who presents to cardiology today for evaluation of exertional presyncope.  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  Occasionally  "will have nausea and diarrhea as well as near syncope.  These tend to occur when he pushes himself really hard on the cycle.  Has never had rosi syncope.  Did have an episode of rosi syncope back in 2008 with subsequent workup being unrevealing.  Based on his description, I question whether it was vasovagal he mediated as he was having severe abdominal pain at the time he passed out.  No history of any chest tightness or shortness of breath with his cycling.  Denies any family history of premature CAD.    ECG (personally reviewed): No previous ECG    Cardiac Imaging Studies (personally reviewed): No previous imaging     Physical Examination Review of Systems   /80 (BP Location: Left arm, Patient Position: Sitting, Cuff Size: Adult Regular)   Pulse 72   Resp 16   Ht 1.753 m (5' 9\")   Wt 78.5 kg (173 lb)   BMI 25.55 kg/m    Body mass index is 25.55 kg/m .  Wt Readings from Last 3 Encounters:   09/04/24 78.5 kg (173 lb)   08/23/24 79.8 kg (175 lb 14.4 oz)   04/11/24 82.6 kg (182 lb)     General Appearance:   Awake, Alert, No acute distress.   HEENT:  No scleral icterus; the mucous membranes were pink and moist.   Neck: No cervical bruits or jugular venous distention    Chest: The spine was straight. The chest was symmetric.   Lungs:   Respirations unlabored; the lungs are clear to auscultation. No wheezing   Cardiovascular:   Regular rate and rhythm.  S1, S2 normal.  No murmur or gallop   Abdomen:  No organomegaly, masses, bruits, or tenderness. Bowels sounds are present   Extremities: No peripheral edema bilaterally   Skin: No xanthelasma. Warm, Dry.   Musculoskeletal: No tenderness.   Neurologic: Mood and affect are appropriate.    Enc Vitals  BP: 124/80  Pulse: 72  Resp: 16  Weight: 78.5 kg (173 lb)  Height: 175.3 cm (5' 9\")                                         Medical History  Surgical History Family History Social History   Past Medical History:   Diagnosis Date     Benign neoplasm of skin of other " and unspecified parts of face     atypical nevus     Compression fracture of body of thoracic vertebra (H) 03/05/2022     Essential hypertension, benign      Hyperlipidemia      Lyme disease 08/2007    hot welts, bull eye bruise     Mixed hyperlipidemia      Other chronic nonalcoholic liver disease 2006    Fatty liver     Prediabetes      Shoulder pain, right     post clavicular fx     Syncope 07/2008    neg Head/Neck CT     Unspecified hemorrhoids without mention of complication     Past Surgical History:   Procedure Laterality Date     COLONOSCOPY  04/2014     COLONOSCOPY N/A 8/26/2019    Procedure: COLONOSCOPY, WITH POLYPECTOMY;  Surgeon: José Baig MD;  Location: [facility] OR     HC US ABDOMEN COMPLETE  6/2006    fatty liver     HERNIA REPAIR  1984     SURGICAL HISTORY OF -   1982    L Inguinal Hernia     SURGICAL HISTORY OF -   8/09    R Clavicle Fracture Repair     [facility] ECHO HEART XTHORACIC,COMPLETE, W/O DOPPLER  8/08    Nl    Family History   Problem Relation Age of Onset     Hypertension Mother      Cancer Mother         kidney     Thyroid Disease Mother         hyper     Hyperlipidemia Mother      Respiratory Father         emphysema     Hypertension Sister      Hyperlipidemia Sister      Hypertension Brother      Hypertension Maternal Grandmother      Cerebrovascular Disease Maternal Grandmother      No Known Problems Daughter      No Known Problems Son      No Known Problems Son      No Known Problems Son      Diabetes Maternal Aunt         type II    Social History     Socioeconomic History     Marital status:      Spouse name: Not on file     Number of children: 4     Years of education: Not on file     Highest education level: Not on file   Occupational History     Employer: [employer]   Tobacco Use     Smoking status: Former     Current packs/day: 0.00     Average packs/day: 0.5 packs/day for 5.0 years (2.5 ttl pk-yrs)     Types: Cigarettes     Start date: 10/1/2005     Quit date: 10/1/2010      Years since quittin.9     Smokeless tobacco: Never     Tobacco comments:     Already quit smoking   Vaping Use     Vaping status: Never Used   Substance and Sexual Activity     Alcohol use: Yes     Comment: Less than 1 drink a week; occasional beer     Drug use: No     Sexual activity: Yes     Partners: Female     Birth control/protection: Male Surgical     Comment: high blood pressure   Other Topics Concern     Parent/sibling w/ CABG, MI or angioplasty before 65F 55M? No   Social History Narrative    Dairy/d 2 servings/d.     Caffeine 2-3 servings/d    Exercise 2 x week - walking    Sunscreen used - Yes    Seatbelts used - Yes    Working smoke/CO detectors in the home - Yes    Guns stored in the home - No    Self Breast Exams - NA    Self Testicular Exam -yes    Eye Exam up to date - Yes    Dental Exam up to date - Yes    Pap Smear up to date - NA    Mammogram up to date - NA    PSA up to date no    Dexa Scan up to date - NA    Flex Sig / Colonoscopy up to date - NA    Immunizations up to date - Yes 00    Abuse: Current or Past(Physical, Sexual or Emotional)- No    Do you feel safe in your environment - Yes    07     Social Determinants of Health     Financial Resource Strain: Low Risk  (2024)    Financial Resource Strain      Within the past 12 months, have you or your family members you live with been unable to get utilities (heat, electricity) when it was really needed?: No   Food Insecurity: Low Risk  (2024)    Food Insecurity      Within the past 12 months, did you worry that your food would run out before you got money to buy more?: No      Within the past 12 months, did the food you bought just not last and you didn t have money to get more?: No   Transportation Needs: Low Risk  (2024)    Transportation Needs      Within the past 12 months, has lack of transportation kept you from medical appointments, getting your medicines, non-medical meetings or appointments, work, or from  getting things that you need?: No   Physical Activity: Sufficiently Active (8/19/2024)    Exercise Vital Sign      Days of Exercise per Week: 4 days      Minutes of Exercise per Session: 40 min   Stress: Stress Concern Present (8/19/2024)    [employer] [employer] - Occupational Stress Questionnaire      Feeling of Stress : Rather much   Social Connections: Unknown (8/19/2024)    Social Connection and Isolation Panel [NHANES]      Frequency of Communication with Friends and Family: Not on file      Frequency of Social Gatherings with Friends and Family: Once a week      Attends [employer] Services: Not on file      Active Member of Clubs or Organizations: Not on file      Attends Club or Organization Meetings: Not on file      Marital Status: Not on file   Interpersonal Safety: Low Risk  (8/23/2024)    Interpersonal Safety      Do you feel physically and emotionally safe where you currently live?: Yes      Within the past 12 months, have you been hit, slapped, kicked or otherwise physically hurt by someone?: No      Within the past 12 months, have you been humiliated or emotionally abused in other ways by your partner or ex-partner?: No   Housing Stability: Low Risk  (8/19/2024)    Housing Stability      Do you have housing? : Yes      Are you worried about losing your housing?: No          Medications  Allergies   Current Outpatient Medications   Medication Sig Dispense Refill     calcium citrate-vitamin D (CALCIUM CITRATE + D3) 315-5 MG-MCG TABS per tablet        lisinopril (ZESTRIL) 10 MG tablet Take 1 tablet (10 mg) by mouth daily. 90 tablet 3     metFORMIN (GLUCOPHAGE XR) 500 MG 24 hr tablet Take 1 tablet (500 mg) by mouth daily (with dinner). 90 tablet 1     Multiple Vitamin (MULTI-VITAMIN) per tablet Take 1 tablet by mouth daily. 100 tablet 12     pravastatin (PRAVACHOL) 20 MG tablet Take 1 tablet (20 mg) by mouth daily. 90 tablet 3     testosterone (ANDROGEL 1.62 % PUMP) 20.25 MG/ACT gel  Place 2 Pump (40.5 mg) onto the skin daily. Apply from dispenser to clean, dry, intact skin of the upper arms and shoulders. 75 g 3     traZODone (DESYREL) 50 MG tablet Take 0.5-1 tablets (25-50 mg) by mouth at bedtime. 90 tablet 3      Allergies   Allergen Reactions     Cephalexin Anaphylaxis     Ivp Dye [Contrast Dye]          Lab Results    Chemistry/lipid CBC Cardiac Enzymes/BNP/TSH/INR   Recent Labs   Lab Test 08/20/24  0923   TRIG 321*   LDL 80   BUN 11.2      CO2 26    Recent Labs   Lab Test 08/20/24 0923   WBC 9.8   HGB 15.4   HCT 46.2   MCV 90       Recent Labs   Lab Test 08/20/24 0923   TSH 2.26        A total of 45 minutes was spent reviewing patient's medical records, obtaining history and performing examination, as well as discussing diagnoses/ recommendations with patient and answering all questions.                        Thank you for allowing me to participate in the care of your patient.      Sincerely,     Abbie Hodge MD     [facility]  cc:   Sarah Rdz PA-C  [address] [place] [address]

## 2024-09-04 NOTE — PATIENT INSTRUCTIONS
Continue current medications  Make certain to make sure you are drinking adequate fluid to maintain hydration  Set up stress echo with further recommendations

## 2024-09-05 LAB
C PEPTIDE SERPL-MCNC: 4 NG/ML (ref 0.9–6.9)
TESTOST FREE SERPL-MCNC: 5.64 NG/DL
TESTOST SERPL-MCNC: 283 NG/DL (ref 240–950)

## 2024-09-06 LAB
GAD65 AB SER IA-ACNC: <5 IU/ML
IGF-I BLD-MCNC: 236 NG/ML (ref 53–206)
ISLET CELL512 AB SER IA-ACNC: <5.4 U/ML

## 2024-09-06 NOTE — RESULT ENCOUNTER NOTE
Testosterone level remains unchanged, trending towards the lower end of normal.  The plan remains as we discussed at the time of the visit.    The growth hormone remains persistently elevated.  I recommend to evaluate this further by pursuing a glucose tolerance test.  This test evaluates the normal response of growth hormone, which decreases once the blood sugar increases.  The test consists of drinking a glucose solution and having blood drawn prior, 1 hr and 2 hrs later, to measure the growth hormone. My staff will contact you and have you scheduled.

## 2024-09-09 NOTE — NURSING NOTE
Current patient location: [address] [place] [id number]    Is the patient currently in the state of [place]? YES    Visit mode:VIDEO    If the visit is dropped, the patient can be reconnected by: VIDEO VISIT: Text to cell phone:   Telephone Information:   Mobile [phone]       Will anyone else be joining the visit? NO  (If patient encounters technical issues they should call [phone])    How would you like to obtain your AVS? MyChart    Are changes needed to the allergy or medication list? Pt stated no med changes    Are refills needed on medications prescribed by this physician? YES-possibly based on the discussion today    Rooming Documentation:  Not applicable      Reason for visit: RECHECK (Review of results from testosterone test and dexa scan)    Amy Barber VVF    Pt states that he had a medication that he took that he had a bad reaction to.     
Roxana Avalos does not do this glucose tolerance test. Per infusion center scheduling patients go down to [facility] in [place]. Their number is [phone].     Writer called and gave this information to the patient. Patient is going to call and schedule the glucose test.    Rasheeda Mars, [facility]  Adult Endocrinology  [facility] [place]      
25-Oct-2019

## 2024-09-13 ENCOUNTER — HOSPITAL ENCOUNTER (OUTPATIENT)
Dept: CARDIOLOGY | Facility: HOSPITAL | Age: 59
Discharge: HOME | End: 2024-09-13
Attending: INTERNAL MEDICINE | Admitting: INTERNAL MEDICINE
Payer: COMMERCIAL

## 2024-09-13 DIAGNOSIS — R55 PRE-SYNCOPE: ICD-10-CM

## 2024-09-13 LAB
CV STRESS CURRENT BP HE: NORMAL
CV STRESS CURRENT HR HE: 100
CV STRESS CURRENT HR HE: 102
CV STRESS CURRENT HR HE: 111
CV STRESS CURRENT HR HE: 114
CV STRESS CURRENT HR HE: 115
CV STRESS CURRENT HR HE: 115
CV STRESS CURRENT HR HE: 120
CV STRESS CURRENT HR HE: 128
CV STRESS CURRENT HR HE: 130
CV STRESS CURRENT HR HE: 130
CV STRESS CURRENT HR HE: 134
CV STRESS CURRENT HR HE: 136
CV STRESS CURRENT HR HE: 136
CV STRESS CURRENT HR HE: 149
CV STRESS CURRENT HR HE: 153
CV STRESS CURRENT HR HE: 69
CV STRESS CURRENT HR HE: 69
CV STRESS CURRENT HR HE: 80
CV STRESS CURRENT HR HE: 83
CV STRESS CURRENT HR HE: 84
CV STRESS CURRENT HR HE: 85
CV STRESS CURRENT HR HE: 87
CV STRESS CURRENT HR HE: 88
CV STRESS CURRENT HR HE: 89
CV STRESS CURRENT HR HE: 91
CV STRESS CURRENT HR HE: 91
CV STRESS CURRENT HR HE: 97
CV STRESS CURRENT HR HE: 98
CV STRESS CURRENT HR HE: 99
CV STRESS CURRENT HR HE: 99
CV STRESS DEVIATION TIME HE: NORMAL
CV STRESS ECHO PERCENT HR HE: NORMAL
CV STRESS EXERCISE STAGE HE: NORMAL
CV STRESS EXERCISE STAGE REACHED HE: NORMAL
CV STRESS FINAL RESTING BP HE: NORMAL
CV STRESS FINAL RESTING HR HE: 83
CV STRESS MAX HR HE: 153
CV STRESS MAX TREADMILL GRADE HE: 16
CV STRESS MAX TREADMILL SPEED HE: 4.2
CV STRESS PEAK DIA BP HE: NORMAL
CV STRESS PEAK SYS BP HE: NORMAL
CV STRESS PHASE HE: NORMAL
CV STRESS PROTOCOL HE: NORMAL
CV STRESS REASON STOPPED HE: NORMAL
CV STRESS RESTING PT POSITION HE: NORMAL
CV STRESS ST DEVIATION AMOUNT HE: NORMAL
CV STRESS ST DEVIATION ELEVATION HE: NORMAL
CV STRESS ST EVELATION AMOUNT HE: NORMAL
CV STRESS SYMPTOMS HE: NORMAL
CV STRESS TEST TYPE HE: NORMAL
CV STRESS TOTAL STAGE TIME MIN 1 HE: NORMAL
STRESS ECHO BASELINE DIASTOLIC HE: 82
STRESS ECHO BASELINE HR: 69
STRESS ECHO BASELINE SYSTOLIC BP: 128
STRESS ECHO LAST STRESS DIASTOLIC BP: 78
STRESS ECHO LAST STRESS HR: 149
STRESS ECHO LAST STRESS SYSTOLIC BP: 164
STRESS ECHO POST ESTIMATED WORKLOAD: 11.5
STRESS ECHO POST EXERCISE DUR MIN: 9
STRESS ECHO POST EXERCISE DUR SEC: 45
STRESS ECHO TARGET HR: 135

## 2024-09-13 PROCEDURE — 93018 CV STRESS TEST I&R ONLY: CPT | Performed by: INTERNAL MEDICINE

## 2024-09-13 PROCEDURE — 93325 DOPPLER ECHO COLOR FLOW MAPG: CPT | Mod: TC

## 2024-09-13 PROCEDURE — 93350 STRESS TTE ONLY: CPT | Mod: 26 | Performed by: INTERNAL MEDICINE

## 2024-09-13 PROCEDURE — 93321 DOPPLER ECHO F-UP/LMTD STD: CPT | Mod: 26 | Performed by: INTERNAL MEDICINE

## 2024-09-13 PROCEDURE — 93325 DOPPLER ECHO COLOR FLOW MAPG: CPT | Mod: 26 | Performed by: INTERNAL MEDICINE

## 2024-09-13 PROCEDURE — 93350 STRESS TTE ONLY: CPT | Mod: TC

## 2024-09-13 PROCEDURE — 93016 CV STRESS TEST SUPVJ ONLY: CPT | Performed by: INTERNAL MEDICINE

## 2024-09-18 ENCOUNTER — LAB (OUTPATIENT)
Dept: LAB | Facility: CLINIC | Age: 59
End: 2024-09-18
Attending: INTERNAL MEDICINE
Payer: COMMERCIAL

## 2024-09-18 DIAGNOSIS — R73.03 PREDIABETES: ICD-10-CM

## 2024-09-18 LAB
FASTING STATUS PATIENT QL REPORTED: YES
GLUCOSE SERPL-MCNC: 94 MG/DL (ref 70–99)

## 2024-09-18 PROCEDURE — 82947 ASSAY GLUCOSE BLOOD QUANT: CPT

## 2024-09-18 PROCEDURE — 36415 COLL VENOUS BLD VENIPUNCTURE: CPT

## 2024-09-19 NOTE — RESULT ENCOUNTER NOTE
I thought you requested to have a glucose checked so I placed an order. Not sure why you have not received a call regarding the glucose test. I asked my staff to look into this.

## 2024-09-26 ENCOUNTER — INFUSION THERAPY VISIT (OUTPATIENT)
Dept: INFUSION THERAPY | Facility: CLINIC | Age: 59
End: 2024-09-26
Attending: INTERNAL MEDICINE
Payer: COMMERCIAL

## 2024-09-26 VITALS
OXYGEN SATURATION: 98 % | HEART RATE: 65 BPM | DIASTOLIC BLOOD PRESSURE: 73 MMHG | SYSTOLIC BLOOD PRESSURE: 108 MMHG | RESPIRATION RATE: 16 BRPM | TEMPERATURE: 98.2 F

## 2024-09-26 DIAGNOSIS — R79.89 HIGH SERUM INSULIN-LIKE GROWTH FACTOR 1 (IGF-1): Primary | ICD-10-CM

## 2024-09-26 LAB
GH SERPL-MCNC: 0.8 UG/L
GH SERPL-MCNC: <0.1 UG/L
GLUCOSE BLDC GLUCOMTR-MCNC: 161 MG/DL (ref 70–99)
GLUCOSE SERPL-MCNC: 108 MG/DL (ref 70–99)
GLUCOSE SERPL-MCNC: 134 MG/DL (ref 70–99)
GLUCOSE SERPL-MCNC: 159 MG/DL (ref 70–99)
GLUCOSE SERPL-MCNC: 167 MG/DL (ref 70–99)
GLUCOSE SERPL-MCNC: 169 MG/DL (ref 70–99)

## 2024-09-26 PROCEDURE — 36415 COLL VENOUS BLD VENIPUNCTURE: CPT | Performed by: INTERNAL MEDICINE

## 2024-09-26 PROCEDURE — 83003 ASSAY GROWTH HORMONE (HGH): CPT | Performed by: INTERNAL MEDICINE

## 2024-09-26 PROCEDURE — 82962 GLUCOSE BLOOD TEST: CPT

## 2024-09-26 PROCEDURE — 250N000009 HC RX 250: Performed by: INTERNAL MEDICINE

## 2024-09-26 PROCEDURE — 82947 ASSAY GLUCOSE BLOOD QUANT: CPT | Performed by: INTERNAL MEDICINE

## 2024-09-26 RX ADMIN — ALCOHOL 75 G: 65 GEL TOPICAL at 09:10

## 2024-09-26 NOTE — PROGRESS NOTES
Infusion Nursing Note:  Fabrice Donahue presents today for OGTT.    Patient seen by provider today: No   present during visit today: Not Applicable.    Note: Started patient for OGTT by inserting PIV and having the patient rest for an hour based from his therapy plan. Care transfer to [facility] after.      /73   Pulse 65   Temp 98.2  F (36.8  C) (Oral)   Resp 16   SpO2 98%      Kera Coleman RN

## 2024-09-26 NOTE — PROGRESS NOTES
Fabrice is here for a glucose tolerance test for a history of Growth Hormone.  Orders written by Dr. Yung on 9/6/2024.  Fabrice arrived NPO, last eating at 06:00 PM 9/25/2024.  IV placed without difficulty by SAMMIE RN.  Requested labs drawn.  Glucose and Insulin levels drawn at -0- 08:31 AM, +30 09:40 AM, +60 10:10 AM, +90 10:40 AM, and + 120 11:10 AM.  Fabrice started drinking the Glucola at 09:10 AM and completed within 1 minutes.  Post blood sugar tested and was 161.  Snack given to patient prior to discharge.   PIV discontinued without difficulty.    Fabrice will follow up, as planned, with his GH team for test results and all future appointments.     Administrations This Visit       glucose tolerence test (GLUCOLA) 25% oral liquid 75 g       Admin Date  09/26/2024 Action  $Given Dose  75 g Route  Oral Documented By  Emerald Boyer MA

## 2024-09-27 LAB
GH SERPL-MCNC: 0.1 UG/L
GH SERPL-MCNC: <0.1 UG/L
GH SERPL-MCNC: <0.1 UG/L

## 2024-09-27 NOTE — RESULT ENCOUNTER NOTE
The growth hormone response to this test was normal. The test showed higher blood glucose levels, in the prediabetic range.

## 2024-09-28 ENCOUNTER — MYC MEDICAL ADVICE (OUTPATIENT)
Dept: FAMILY MEDICINE | Facility: CLINIC | Age: 59
End: 2024-09-28
Payer: COMMERCIAL

## 2024-09-28 DIAGNOSIS — E78.1 HYPERTRIGLYCERIDEMIA: Primary | ICD-10-CM

## 2024-10-01 RX ORDER — ROSUVASTATIN CALCIUM 10 MG/1
10 TABLET, COATED ORAL DAILY
Qty: 90 TABLET | Refills: 3 | Status: SHIPPED | OUTPATIENT
Start: 2024-10-01

## 2024-10-08 LAB — ZNT8 AB SERPL IA-ACNC: 10.4 U/ML

## 2024-10-22 ENCOUNTER — MYC MEDICAL ADVICE (OUTPATIENT)
Dept: FAMILY MEDICINE | Facility: CLINIC | Age: 59
End: 2024-10-22
Payer: COMMERCIAL

## 2024-10-22 DIAGNOSIS — I10 HYPERTENSION GOAL BP (BLOOD PRESSURE) < 140/90: Primary | ICD-10-CM

## 2024-10-23 RX ORDER — AMLODIPINE BESYLATE 2.5 MG/1
2.5 TABLET ORAL DAILY
Qty: 90 TABLET | Refills: 0 | Status: SHIPPED | OUTPATIENT
Start: 2024-10-23

## 2024-10-30 ENCOUNTER — VIRTUAL VISIT (OUTPATIENT)
Dept: EDUCATION SERVICES | Facility: CLINIC | Age: 59
End: 2024-10-30
Attending: INTERNAL MEDICINE
Payer: COMMERCIAL

## 2024-10-30 DIAGNOSIS — R73.03 PREDIABETES: ICD-10-CM

## 2024-10-30 PROCEDURE — 97802 MEDICAL NUTRITION INDIV IN: CPT | Mod: 95 | Performed by: DIETITIAN, REGISTERED

## 2024-10-30 NOTE — PATIENT INSTRUCTIONS
FOLLOW-UP:   Aim to eat 3 meals/day, no longer than 4-5 hours between meals (except for the 12 hour fast overnight), having 3 snacks/day.  Aiming for 45-75 grams of carbohydrate/meal and 0-30 grams/snack.    Continue to be physically active.  Consider talking with a .    Continue to take Metformin as prescribed.  If you want to test your BG feel free to test.  Alternating testing times: fasting or before or 2 hrs after the start of the meal.  Record.  Use your    5.  Follow up with Sarah Rdz as directed.  6.  Follow up with Diabetes Education as needed.  If you have questions you can send them through [employer] or call Diabetes Education Triage [phone].  For Scheduling call [phone].

## 2024-10-30 NOTE — PROGRESS NOTES
Medical Nutrition Therapy for Diabetes    Type of service:  Video Visit    If the video visit is dropped, the video visit invitation should be resent by: Text to cell phone: [phone]    Originating Location (pt. Location): Home  Distant Location (provider location): [facility]  Mode of Communication:  Video Conference via Mixgar    Video Start Time:  2:03.pm  Video End Time (time video stopped): 3:12pm    How would patient like to obtain AVS? MyChart    Visit Type:Initial assessment and intervention    Fabrice GLORY Cemfatemeh presents today for MNT and education related to prediabetes.   He is accompanied by self.     ASSESSMENT:   Patient comments/concerns relating to nutrition: Has started logging food intake.  Problems controlling A1c level.  Wants a plan to modify diet.  Most amount of weight loss wanted 10#.  Currently on Metformin about 6 weeks ago.  Not aware of any family history of diabetes.  Currently biking biking indoor, 3-4 times/week,  minutes/time, but this will be ramping up.  Does have someone he meets with who specializes in this.  He is open to testing BG.  NUTRITION HISTORY:    Breakfast (5:30am): Cereal (Magic Spoon cinnamon) w/ skim milk, banana, 1 cup coffee- black (38.7 grams carbohydrate)  Lunch (11:30-noon): [employer] fiery chicken szymanski meal (40 grams), 1 cup milk, 1/2 cup moose track ice cream (92 grams carbohydrate)  PM:  licorice (red) 22 grams)  Dinner (6pm): lasagna w/ meat, Tajik bread, butter, water (137.3 grams)  HS Snacks: nothing  Beverages: coffee 1 cup caff and 2-3 cups decaf/day (trying to cut down), milk, water, diet soda occassionally, no alcohol; when he works out will drink liquid iv in water bottle    Misses meals? Skips dinner or eat a light dinner when works out (will eat a yogurt)  Eats out:  1 meal/per week (if traveling 10x/week)    Previous diet education:  No  (self study)    Food allergies/intolerances: no known    Diet is high  in: carbohydrates (at some meals)  Diet is low in: fruits and vegetables    EXERCISE: moderate regular exercise program which includes biking indoor, 3-4 times/week,  minutes/time; walking (casually 40 minutes/day, 7 days/week)    SOCIO/ECONOMIC:   Lives with: self    BLOOD GLUCOSE MONITORING:  Patient glucose self monitoring as follows: never.     Patient's most recent   Lab Results   Component Value Date    A1C 6.1 08/20/2024    is meeting goal of <7.0    MEDICATIONS:  Current Outpatient Medications   Medication Sig Dispense Refill    amLODIPine (NORVASC) 2.5 MG tablet Take 1 tablet (2.5 mg) by mouth daily. 90 tablet 0    calcium citrate-vitamin D (CALCIUM CITRATE + D3) 315-5 MG-MCG TABS per tablet  (Patient not taking: Reported on 9/26/2024)      metFORMIN (GLUCOPHAGE XR) 500 MG 24 hr tablet Take 1 tablet (500 mg) by mouth daily (with dinner). 90 tablet 1    Multiple Vitamin (MULTI-VITAMIN) per tablet Take 1 tablet by mouth daily. (Patient not taking: Reported on 9/26/2024) 100 tablet 12    rosuvastatin (CRESTOR) 10 MG tablet Take 1 tablet (10 mg) by mouth daily. 90 tablet 3    testosterone (ANDROGEL 1.62 % PUMP) 20.25 MG/ACT gel Place 2 Pump (40.5 mg) onto the skin daily. Apply from dispenser to clean, dry, intact skin of the upper arms and shoulders. 75 g 3    traZODone (DESYREL) 50 MG tablet Take 0.5-1 tablets (25-50 mg) by mouth at bedtime. 90 tablet 3     No current facility-administered medications for this visit.       LABS:  Lab Results   Component Value Date    A1C 6.1 08/20/2024     Lab Results   Component Value Date     09/26/2024     09/26/2024    GLC 93 07/11/2022     07/07/2020     Lab Results   Component Value Date    LDL 80 08/20/2024     07/07/2020     HDL Cholesterol   Date Value Ref Range Status   07/07/2020 24 (L) >39 mg/dL Final     Direct Measure HDL   Date Value Ref Range Status   08/20/2024 23 (L) >=40 mg/dL Final   ]  GFR Estimate   Date Value Ref Range  "Status   08/20/2024 >90 >60 mL/min/1.73m2 Final     Comment:     eGFR calculated using 2021 CKD-EPI equation.   07/07/2020 >90 >60 mL/min/[1.73_m2] Final     Comment:     Non  GFR Calc  Starting 12/18/2018, serum creatinine based estimated GFR (eGFR) will be   calculated using the Chronic Kidney Disease Epidemiology Collaboration   (CKD-EPI) equation.       Lab Results   Component Value Date    CR 0.78 08/20/2024    CR 0.80 07/07/2020     No results found for: \"MICROALBUMIN\"    ANTHROPOMETRICS:  Vitals: There were no vitals taken for this visit.  There is no height or weight on file to calculate BMI.      Wt Readings from Last 5 Encounters:   09/04/24 78.5 kg (173 lb)   08/23/24 79.8 kg (175 lb 14.4 oz)   04/11/24 82.6 kg (182 lb)   04/03/24 78 kg (172 lb)   01/31/24 78.9 kg (174 lb)   States has dropped down ~ 7#, down to 166# (at home scale)    NUTRITION DIAGNOSIS: Altered nutrition-related laboratory values related to PreDiabetes as evidenced by Hgba1c.      NUTRITION INTERVENTION:  Education Materials Provided: 100 Calorie Snacks, Six Small Meals a Day, sample meal plans and On Your Way to Preventing Type 2 Diabetes    PATIENT'S BEHAVIOR CHANGE GOALS:   See Patient Instructions for patient stated behavior change goals. AVS was printed and given to patient at today's appointment.    MONITOR / EVALUATE:  RD will monitor/evaluate:  Blood Glucose / A1c    FOLLOW-UP:   Aim to eat 3 meals/day, no longer than 4-5 hours between meals (except for the 12 hour fast overnight), having 3 snacks/day.  Aiming for 45-75 grams of carbohydrate/meal and 0-30 grams/snack.    Continue to be physically active.  Consider talking with a .    Continue to take Metformin as prescribed.  If you want to test your BG feel free to test.  Alternating testing times: fasting or before or 2 hrs after the start of the meal.  Record.  Use your    5.  Follow up with Sarah dRz as directed.  6.  Follow up with " Diabetes Education as needed.  If you have questions you can send them through Medical [employer] or call Diabetes Education Triage [phone].  For Scheduling call [phone].    Caro Kaye RD, [facility], 5:09 PM, 10/30/2024    Time spent in minutes: 70 minutes, billed 60 minutes  Encounter: Individual

## 2024-10-30 NOTE — LETTER
10/30/2024         RE: Fabrice Donahue  [address]  [facility] [address]        Dear Colleague,    Thank you for referring your patient, Fabrice Donahue, to the [facility]. Please see a copy of my visit note below.    Medical Nutrition Therapy for Diabetes    Type of service:  Video Visit    If the video visit is dropped, the video visit invitation should be resent by: Text to cell phone: [phone]    Originating Location (pt. Location): Home  Distant Location (provider location): [facility]  Mode of Communication:  Video Conference via Tempo AI Start Time:  2:03.pm  Video End Time (time video stopped): 3:12pm    How would patient like to obtain AVS? MyChart    Visit Type:Initial assessment and intervention    Fabrice Donahue presents today for MNT and education related to prediabetes.   He is accompanied by self.     ASSESSMENT:   Patient comments/concerns relating to nutrition: Has started logging food intake.  Problems controlling A1c level.  Wants a plan to modify diet.  Most amount of weight loss wanted 10#.  Currently on Metformin about 6 weeks ago.  Not aware of any family history of diabetes.  Currently biking biking indoor, 3-4 times/week,  minutes/time, but this will be ramping up.  Does have someone he meets with who specializes in this.  He is open to testing BG.  NUTRITION HISTORY:    Breakfast (5:30am): Cereal (Magic Spoon cinnamon) w/ skim milk, banana, 1 cup coffee- black (38.7 grams carbohydrate)  Lunch (11:30-noon): [employer] fiery chicken szymanski meal (40 grams), 1 cup milk, 1/2 cup moose track ice cream (92 grams carbohydrate)  PM:  licorice (red) 22 grams)  Dinner (6pm): lasagna w/ meat, [place] bread, butter, water (137.3 grams)  HS Snacks: nothing  Beverages: coffee 1 cup caff and 2-3 cups decaf/day (trying to cut down), milk, water, diet soda occassionally, no alcohol; when he works out will drink liquid iv in water  bottle    Misses meals? Skips dinner or eat a light dinner when works out (will eat a yogurt)  Eats out:  1 meal/per week (if traveling 10x/week)    Previous diet education:  No  (self study)    Food allergies/intolerances: no known    Diet is high in: carbohydrates (at some meals)  Diet is low in: fruits and vegetables    EXERCISE: moderate regular exercise program which includes biking indoor, 3-4 times/week,  minutes/time; walking (casually 40 minutes/day, 7 days/week)    SOCIO/ECONOMIC:   Lives with: self    BLOOD GLUCOSE MONITORING:  Patient glucose self monitoring as follows: never.     Patient's most recent   Lab Results   Component Value Date    A1C 6.1 08/20/2024    is meeting goal of <7.0    MEDICATIONS:  Current Outpatient Medications   Medication Sig Dispense Refill     amLODIPine (NORVASC) 2.5 MG tablet Take 1 tablet (2.5 mg) by mouth daily. 90 tablet 0     calcium citrate-vitamin D (CALCIUM CITRATE + D3) 315-5 MG-MCG TABS per tablet  (Patient not taking: Reported on 9/26/2024)       metFORMIN (GLUCOPHAGE XR) 500 MG 24 hr tablet Take 1 tablet (500 mg) by mouth daily (with dinner). 90 tablet 1     Multiple Vitamin (MULTI-VITAMIN) per tablet Take 1 tablet by mouth daily. (Patient not taking: Reported on 9/26/2024) 100 tablet 12     rosuvastatin (CRESTOR) 10 MG tablet Take 1 tablet (10 mg) by mouth daily. 90 tablet 3     testosterone (ANDROGEL 1.62 % PUMP) 20.25 MG/ACT gel Place 2 Pump (40.5 mg) onto the skin daily. Apply from dispenser to clean, dry, intact skin of the upper arms and shoulders. 75 g 3     traZODone (DESYREL) 50 MG tablet Take 0.5-1 tablets (25-50 mg) by mouth at bedtime. 90 tablet 3     No current facility-administered medications for this visit.       LABS:  Lab Results   Component Value Date    A1C 6.1 08/20/2024     Lab Results   Component Value Date     09/26/2024     09/26/2024    GLC 93 07/11/2022     07/07/2020     Lab Results   Component Value Date     "LDL 80 08/20/2024     07/07/2020     HDL Cholesterol   Date Value Ref Range Status   07/07/2020 24 (L) >39 mg/dL Final     Direct Measure HDL   Date Value Ref Range Status   08/20/2024 23 (L) >=40 mg/dL Final   ]  GFR Estimate   Date Value Ref Range Status   08/20/2024 >90 >60 mL/min/1.73m2 Final     Comment:     eGFR calculated using 2021 CKD-EPI equation.   07/07/2020 >90 >60 mL/min/[1.73_m2] Final     Comment:     Non  GFR Calc  Starting 12/18/2018, serum creatinine based estimated GFR (eGFR) will be   calculated using the Chronic Kidney Disease Epidemiology Collaboration   (CKD-EPI) equation.       Lab Results   Component Value Date    CR 0.78 08/20/2024    CR 0.80 07/07/2020     No results found for: \"MICROALBUMIN\"    ANTHROPOMETRICS:  Vitals: There were no vitals taken for this visit.  There is no height or weight on file to calculate BMI.      Wt Readings from Last 5 Encounters:   09/04/24 78.5 kg (173 lb)   08/23/24 79.8 kg (175 lb 14.4 oz)   04/11/24 82.6 kg (182 lb)   04/03/24 78 kg (172 lb)   01/31/24 78.9 kg (174 lb)   States has dropped down ~ 7#, down to 166# (at home scale)    NUTRITION DIAGNOSIS: Altered nutrition-related laboratory values related to PreDiabetes as evidenced by Hgba1c.      NUTRITION INTERVENTION:  Education Materials Provided: 100 Calorie Snacks, Six Small Meals a Day, sample meal plans and On Your Way to Preventing Type 2 Diabetes    PATIENT'S BEHAVIOR CHANGE GOALS:   See Patient Instructions for patient stated behavior change goals. AVS was printed and given to patient at today's appointment.    MONITOR / EVALUATE:  RD will monitor/evaluate:  Blood Glucose / A1c    FOLLOW-UP:   Aim to eat 3 meals/day, no longer than 4-5 hours between meals (except for the 12 hour fast overnight), having 3 snacks/day.  Aiming for 45-75 grams of carbohydrate/meal and 0-30 grams/snack.    Continue to be physically active.  Consider talking with a .  "   Continue to take Metformin as prescribed.  If you want to test your BG feel free to test.  Alternating testing times: fasting or before or 2 hrs after the start of the meal.  Record.  Use your    5.  Follow up with Sarah Rdz as directed.  6.  Follow up with Diabetes Education as needed.  If you have questions you can send them through [employer] or call Diabetes Education Triage [phone].  For Scheduling call [phone].    Caro Kaye RD, [facility], 5:09 PM, 10/30/2024    Time spent in minutes: 70 minutes, billed 60 minutes  Encounter: Individual

## 2024-11-29 ENCOUNTER — TELEPHONE (OUTPATIENT)
Dept: GASTROENTEROLOGY | Facility: CLINIC | Age: 59
End: 2024-11-29
Payer: COMMERCIAL

## 2024-11-29 NOTE — TELEPHONE ENCOUNTER
Pre visit planning completed.      Procedure details:    Patient scheduled for Colonoscopy on 12/11/24.     Arrival time: 1400. Procedure time 1500    Facility location: [facility]; [address] [place] [address], [address] Floor, [place] [address]. Check in location: 5th Floor.    Sedation type: Conscious sedation     Pre op exam needed? No.    Indication for procedure: Screening      Chart review:     Electronic implanted devices? No    Recent diagnosis of diverticulitis within the last 6 weeks? No      Medication review:    Diabetic? Yes. Oral diabetic medications: Metformin (glucophage): HOLD day of procedure.    Anticoagulants? No    Weight loss medication/injectable? No GLP-1 medication per patient's medication list. Nursing to verify with pre-assessment call.    Other medication HOLDING recommendations:  N/A      Prep for procedure:     Bowel prep recommendation: Standard Golytely.     Bowel prep prescription sent to   [facility] - [place], [place] - [address]  Due to: diabetes.     Prep instructions sent via carla Alanis RN  Endoscopy Procedure Pre Assessment   [phone] option 2

## 2024-12-02 NOTE — TELEPHONE ENCOUNTER
Attempted to contact patient in order to complete pre assessment questions.     No answer. Left message to return call to [phone] option 2.    Callback communication sent via Stylechi.    Natalie Espitia RN

## 2024-12-02 NOTE — TELEPHONE ENCOUNTER
Pre assessment completed for upcoming procedure.   (Please see previous telephone encounter notes for complete details)    Patient  returned call.       Procedure details:    Arrival time and facility location reviewed.    Pre op exam needed? No.    Designated  policy reviewed. Instructed to have someone stay 6  hours post procedure.       Medication review:    Medications reviewed. Please see supporting documentation below. Holding recommendations discussed (if applicable).   Oral diabetic medication(s): Metformin (glucophage): HOLD day of procedure.      Prep for procedure:     Procedure prep instructions reviewed.        Any additional information needed:  N/A      Patient  verbalized understanding and had no questions or concerns at this time.      Guerita Magallanes RN  Endoscopy Procedure Pre Assessment   [phone] option 2

## 2024-12-11 ENCOUNTER — HOSPITAL ENCOUNTER (OUTPATIENT)
Facility: AMBULATORY SURGERY CENTER | Age: 59
Discharge: HOME | End: 2024-12-11
Attending: INTERNAL MEDICINE
Payer: COMMERCIAL

## 2024-12-11 VITALS
HEIGHT: 69 IN | OXYGEN SATURATION: 95 % | TEMPERATURE: 97 F | DIASTOLIC BLOOD PRESSURE: 94 MMHG | HEART RATE: 68 BPM | WEIGHT: 166 LBS | RESPIRATION RATE: 16 BRPM | SYSTOLIC BLOOD PRESSURE: 118 MMHG | BODY MASS INDEX: 24.59 KG/M2

## 2024-12-11 LAB
COLONOSCOPY: NORMAL
GLUCOSE BLDC GLUCOMTR-MCNC: 104 MG/DL (ref 70–99)

## 2024-12-11 PROCEDURE — 82962 GLUCOSE BLOOD TEST: CPT | Performed by: PATHOLOGY

## 2024-12-11 RX ORDER — LIDOCAINE 40 MG/G
CREAM TOPICAL
Status: DISCONTINUED | OUTPATIENT
Start: 2024-12-11 | End: 2024-12-11 | Stop reason: HOSPADM

## 2024-12-11 RX ORDER — SODIUM CHLORIDE, SODIUM LACTATE, POTASSIUM CHLORIDE, CALCIUM CHLORIDE 600; 310; 30; 20 MG/100ML; MG/100ML; MG/100ML; MG/100ML
INJECTION, SOLUTION INTRAVENOUS CONTINUOUS
Status: DISCONTINUED | OUTPATIENT
Start: 2024-12-11 | End: 2024-12-11 | Stop reason: HOSPADM

## 2024-12-11 RX ORDER — PROCHLORPERAZINE MALEATE 10 MG
10 TABLET ORAL EVERY 6 HOURS PRN
Status: DISCONTINUED | OUTPATIENT
Start: 2024-12-11 | End: 2024-12-12 | Stop reason: HOSPADM

## 2024-12-11 RX ORDER — ONDANSETRON 4 MG/1
4 TABLET, ORALLY DISINTEGRATING ORAL EVERY 6 HOURS PRN
Status: DISCONTINUED | OUTPATIENT
Start: 2024-12-11 | End: 2024-12-12 | Stop reason: HOSPADM

## 2024-12-11 RX ORDER — NALOXONE HYDROCHLORIDE 0.4 MG/ML
0.4 INJECTION, SOLUTION INTRAMUSCULAR; INTRAVENOUS; SUBCUTANEOUS
Status: DISCONTINUED | OUTPATIENT
Start: 2024-12-11 | End: 2024-12-12 | Stop reason: HOSPADM

## 2024-12-11 RX ORDER — NALOXONE HYDROCHLORIDE 0.4 MG/ML
0.2 INJECTION, SOLUTION INTRAMUSCULAR; INTRAVENOUS; SUBCUTANEOUS
Status: DISCONTINUED | OUTPATIENT
Start: 2024-12-11 | End: 2024-12-12 | Stop reason: HOSPADM

## 2024-12-11 RX ORDER — ONDANSETRON 2 MG/ML
4 INJECTION INTRAMUSCULAR; INTRAVENOUS
Status: DISCONTINUED | OUTPATIENT
Start: 2024-12-11 | End: 2024-12-11 | Stop reason: HOSPADM

## 2024-12-11 RX ORDER — FENTANYL CITRATE 50 UG/ML
INJECTION, SOLUTION INTRAMUSCULAR; INTRAVENOUS DAILY PRN
Status: DISCONTINUED | OUTPATIENT
Start: 2024-12-11 | End: 2024-12-11 | Stop reason: HOSPADM

## 2024-12-11 RX ORDER — ONDANSETRON 2 MG/ML
4 INJECTION INTRAMUSCULAR; INTRAVENOUS EVERY 6 HOURS PRN
Status: DISCONTINUED | OUTPATIENT
Start: 2024-12-11 | End: 2024-12-12 | Stop reason: HOSPADM

## 2024-12-11 RX ORDER — FLUMAZENIL 0.1 MG/ML
0.2 INJECTION, SOLUTION INTRAVENOUS
Status: DISCONTINUED | OUTPATIENT
Start: 2024-12-11 | End: 2024-12-12 | Stop reason: HOSPADM

## 2024-12-11 RX ADMIN — SODIUM CHLORIDE, SODIUM LACTATE, POTASSIUM CHLORIDE, CALCIUM CHLORIDE: 600; 310; 30; 20 INJECTION, SOLUTION INTRAVENOUS at 14:38

## 2024-12-11 NOTE — H&P
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)    Midline thoracic back pain    Prediabetes    Diarrhea       Past Surgical History:    Past Surgical History:   Procedure Laterality Date    COLONOSCOPY  2014    COLONOSCOPY N/A 2019    Procedure: COLONOSCOPY, WITH POLYPECTOMY;  Surgeon: José Baig MD;  [facility]     US ABDOMEN COMPLETE  2006    fatty liver    HERNIA REPAIR      SURGICAL HISTORY OF -       L Inguinal Hernia    SURGICAL HISTORY OF -       R Clavicle Fracture Repair    ZZC ECHO HEART XTHORACIC,COMPLETE, W/O DOPPLER      Nl       Past Medical History:   Past Medical History:   Diagnosis Date    Benign neoplasm of skin of other and unspecified parts of face     atypical nevus    Compression fracture of body of thoracic vertebra (H) 2022    Essential hypertension, benign     Hyperlipidemia     Lyme disease 2007    hot welts, bull eye bruise    Mixed hyperlipidemia     Other chronic nonalcoholic liver disease 2006    Fatty liver    Prediabetes     Shoulder pain, right     post clavicular fx    Syncope 2008    neg Head/Neck CT    Unspecified hemorrhoids without mention of complication        Social History:   Social History     Tobacco Use    Smoking status: Former     Current packs/day: 0.00     Average packs/day: 0.5 packs/day for 5.0 years (2.5 ttl pk-yrs)     Types: Cigarettes     Start date: 10/1/2005     Quit date: 10/1/2010     Years since quittin.2     Smokeless tobacco: Never    Tobacco comments:     Already quit smoking   Substance Use Topics    Alcohol use: Yes     Comment: Less than 1 drink a week; occasional beer       Family History:   Family History   Problem Relation Age of Onset    Hypertension Mother     Cancer Mother         kidney    Thyroid Disease Mother         hyper    Hyperlipidemia Mother     Respiratory Father         emphysema    Hypertension Sister     Hyperlipidemia Sister     Hypertension Brother     Hypertension Maternal Grandmother     Cerebrovascular Disease Maternal Grandmother     No Known Problems Daughter     No Known Problems Son     No Known Problems Son     No Known Problems Son     Diabetes Maternal Aunt         type II       Allergies:   Allergies   Allergen Reactions    Cephalexin Anaphylaxis    Lisinopril Angioedema    Ivp Dye [Contrast Dye]        Active Medications:   Current Outpatient Medications   Medication Sig Dispense Refill    amLODIPine (NORVASC) 2.5 MG tablet Take 1 tablet (2.5 mg) by mouth daily. 90 tablet 0    bisacodyl (DULCOLAX) 5 MG EC tablet Take 2 tablets at 3 pm the day before your procedure. If your procedure is before 11 am, take 2 additional tablets at 11 pm. If your procedure is after 11 am, take 2 additional tablets at 6 am. For additional instructions refer to your colonoscopy prep instructions. 4 tablet 0    calcium citrate-vitamin D (CALCIUM CITRATE + D3) 315-5 MG-MCG TABS per tablet  (Patient not taking: Reported on 9/26/2024)      metFORMIN (GLUCOPHAGE XR) 500 MG 24 hr tablet Take 1 tablet (500 mg) by mouth daily (with dinner). 90 tablet 1    Multiple Vitamin (MULTI-VITAMIN) per tablet Take 1 tablet by mouth daily. (Patient not taking: Reported on 9/26/2024) 100 tablet 12    polyethylene glycol (GOLYTELY) 236 g suspension The night before the exam at 6 pm drink an 8-ounce glass every 15 minutes until the jug is half empty. If you arrive before 11 AM: Drink the other half of the lemonade.ukly jug at 11 PM  night before procedure. If you arrive after 11 AM: Drink the other half of the [place] jug at 6 AM day of procedure. For additional instructions refer to your colonoscopy prep instructions. 4000 mL 0    rosuvastatin (CRESTOR) 10 MG tablet Take 1 tablet (10 mg) by mouth daily. 90 tablet 3    testosterone (ANDROGEL 1.62 % PUMP) 20.25 MG/ACT gel Place 2 Pump (40.5 mg) onto the skin daily. Apply from dispenser to clean, dry, intact skin of the upper arms and shoulders. 75 g 3    traZODone (DESYREL) 50 MG tablet Take 0.5-1 tablets (25-50 mg) by mouth at bedtime. 90 tablet 3       Systemic Review:   CONSTITUTIONAL: NEGATIVE for fever, chills, change in weight  ENT/MOUTH: NEGATIVE for ear, mouth and throat problems  RESP: NEGATIVE for significant cough or SOB  CV: NEGATIVE for chest pain, palpitations or peripheral edema    Physical Examination:   Vital Signs: There were no vitals taken for this visit.  GENERAL: healthy, alert and no distress  NECK: no adenopathy, no asymmetry, masses, or scars  RESP: lungs clear to auscultation - no rales, rhonchi or wheezes  CV: regular rate and rhythm, normal S1 S2, no S3 or S4, no murmur, click or rub, no peripheral edema and peripheral pulses strong  ABDOMEN: soft, nontender, no hepatosplenomegaly, no masses and bowel sounds normal  MS: no gross musculoskeletal defects noted, no edema    Plan: Appropriate to proceed as scheduled.      Mague Thakur MD  12/11/2024    PCP:  Sarah Rdz

## 2024-12-12 ENCOUNTER — MYC MEDICAL ADVICE (OUTPATIENT)
Dept: FAMILY MEDICINE | Facility: CLINIC | Age: 59
End: 2024-12-12

## 2024-12-12 NOTE — TELEPHONE ENCOUNTER
Sarah - see pt MyChart regarding BP readings/med questions. Appreciate advisement.    YELENA Arora, BSN, PHN, AMB-BC (she/her)  [facility] RN

## 2024-12-19 ENCOUNTER — LAB (OUTPATIENT)
Dept: LAB | Facility: CLINIC | Age: 59
End: 2024-12-19
Payer: COMMERCIAL

## 2024-12-19 DIAGNOSIS — R35.89 POLYURIA: ICD-10-CM

## 2024-12-19 DIAGNOSIS — R79.89 LOW TESTOSTERONE: ICD-10-CM

## 2024-12-19 LAB
ALT SERPL W P-5'-P-CCNC: 20 U/L (ref 0–70)
AST SERPL W P-5'-P-CCNC: 23 U/L (ref 0–45)
BASOPHILS # BLD AUTO: 0.1 10E3/UL (ref 0–0.2)
BASOPHILS NFR BLD AUTO: 1 %
EOSINOPHIL # BLD AUTO: 0.5 10E3/UL (ref 0–0.7)
EOSINOPHIL NFR BLD AUTO: 5 %
ERYTHROCYTE [DISTWIDTH] IN BLOOD BY AUTOMATED COUNT: 12.4 % (ref 10–15)
HCT VFR BLD AUTO: 49.7 % (ref 40–53)
HGB BLD-MCNC: 16.1 G/DL (ref 13.3–17.7)
IMM GRANULOCYTES # BLD: 0 10E3/UL
IMM GRANULOCYTES NFR BLD: 0 %
LYMPHOCYTES # BLD AUTO: 4.6 10E3/UL (ref 0.8–5.3)
LYMPHOCYTES NFR BLD AUTO: 50 %
MCH RBC QN AUTO: 29.6 PG (ref 26.5–33)
MCHC RBC AUTO-ENTMCNC: 32.4 G/DL (ref 31.5–36.5)
MCV RBC AUTO: 91 FL (ref 78–100)
MONOCYTES # BLD AUTO: 0.8 10E3/UL (ref 0–1.3)
MONOCYTES NFR BLD AUTO: 8 %
NEUTROPHILS # BLD AUTO: 3.4 10E3/UL (ref 1.6–8.3)
NEUTROPHILS NFR BLD AUTO: 37 %
PLATELET # BLD AUTO: 217 10E3/UL (ref 150–450)
PSA SERPL DL<=0.01 NG/ML-MCNC: 0.4 NG/ML (ref 0–4.5)
RBC # BLD AUTO: 5.44 10E6/UL (ref 4.4–5.9)
WBC # BLD AUTO: 9.4 10E3/UL (ref 4–11)

## 2024-12-23 ENCOUNTER — MYC MEDICAL ADVICE (OUTPATIENT)
Dept: ENDOCRINOLOGY | Facility: CLINIC | Age: 59
End: 2024-12-23

## 2024-12-23 DIAGNOSIS — R79.89 LOW TESTOSTERONE: Primary | ICD-10-CM

## 2024-12-31 ENCOUNTER — VIRTUAL VISIT (OUTPATIENT)
Dept: ENDOCRINOLOGY | Facility: CLINIC | Age: 59
End: 2024-12-31
Payer: COMMERCIAL

## 2024-12-31 ENCOUNTER — ALLIED HEALTH/NURSE VISIT (OUTPATIENT)
Dept: FAMILY MEDICINE | Facility: CLINIC | Age: 59
End: 2024-12-31
Payer: COMMERCIAL

## 2024-12-31 ENCOUNTER — TELEPHONE (OUTPATIENT)
Dept: FAMILY MEDICINE | Facility: CLINIC | Age: 59
End: 2024-12-31

## 2024-12-31 ENCOUNTER — LAB (OUTPATIENT)
Dept: LAB | Facility: CLINIC | Age: 59
End: 2024-12-31
Payer: COMMERCIAL

## 2024-12-31 ENCOUNTER — HOSPITAL ENCOUNTER (EMERGENCY)
Facility: CLINIC | Age: 59
Discharge: HOME | End: 2024-12-31
Attending: EMERGENCY MEDICINE
Payer: COMMERCIAL

## 2024-12-31 VITALS
SYSTOLIC BLOOD PRESSURE: 158 MMHG | HEART RATE: 60 BPM | TEMPERATURE: 98.1 F | RESPIRATION RATE: 16 BRPM | OXYGEN SATURATION: 98 % | HEIGHT: 68 IN | DIASTOLIC BLOOD PRESSURE: 75 MMHG | BODY MASS INDEX: 25.16 KG/M2 | WEIGHT: 166 LBS

## 2024-12-31 VITALS — DIASTOLIC BLOOD PRESSURE: 103 MMHG | SYSTOLIC BLOOD PRESSURE: 175 MMHG | OXYGEN SATURATION: 98 %

## 2024-12-31 DIAGNOSIS — T78.3XXA ANGIOEDEMA, INITIAL ENCOUNTER: Primary | ICD-10-CM

## 2024-12-31 DIAGNOSIS — R79.89 LOW TESTOSTERONE: ICD-10-CM

## 2024-12-31 DIAGNOSIS — T78.40XA ALLERGIC REACTION: ICD-10-CM

## 2024-12-31 DIAGNOSIS — T78.3XXA ANGIOEDEMA, INITIAL ENCOUNTER: ICD-10-CM

## 2024-12-31 DIAGNOSIS — E29.1 PRIMARY MALE HYPOGONADISM: Primary | ICD-10-CM

## 2024-12-31 DIAGNOSIS — M85.851 OSTEOPENIA OF BOTH HIPS: ICD-10-CM

## 2024-12-31 DIAGNOSIS — M85.852 OSTEOPENIA OF BOTH HIPS: ICD-10-CM

## 2024-12-31 DIAGNOSIS — R79.89 HIGH SERUM INSULIN-LIKE GROWTH FACTOR 1 (IGF-1): ICD-10-CM

## 2024-12-31 DIAGNOSIS — R73.03 PREDIABETES: ICD-10-CM

## 2024-12-31 LAB — SHBG SERPL-SCNC: 25 NMOL/L (ref 11–80)

## 2024-12-31 PROCEDURE — 84270 ASSAY OF SEX HORMONE GLOBUL: CPT

## 2024-12-31 PROCEDURE — 99214 OFFICE O/P EST MOD 30 MIN: CPT | Mod: 95 | Performed by: INTERNAL MEDICINE

## 2024-12-31 PROCEDURE — 99207 PR NO CHARGE NURSE ONLY: CPT

## 2024-12-31 PROCEDURE — 36415 COLL VENOUS BLD VENIPUNCTURE: CPT

## 2024-12-31 PROCEDURE — 99283 EMERGENCY DEPT VISIT LOW MDM: CPT | Performed by: EMERGENCY MEDICINE

## 2024-12-31 PROCEDURE — 250N000012 HC RX MED GY IP 250 OP 636 PS 637: Performed by: EMERGENCY MEDICINE

## 2024-12-31 PROCEDURE — 99284 EMERGENCY DEPT VISIT MOD MDM: CPT | Performed by: EMERGENCY MEDICINE

## 2024-12-31 RX ORDER — PREDNISONE 20 MG/1
TABLET ORAL
Qty: 10 TABLET | Refills: 0 | Status: SHIPPED | OUTPATIENT
Start: 2024-12-31

## 2024-12-31 RX ORDER — PREDNISONE 20 MG/1
40 TABLET ORAL ONCE
Status: COMPLETED | OUTPATIENT
Start: 2024-12-31 | End: 2024-12-31

## 2024-12-31 RX ORDER — EPINEPHRINE 0.3 MG/.3ML
0.3 INJECTION SUBCUTANEOUS PRN
Qty: 2 EACH | Refills: 1 | Status: SHIPPED | OUTPATIENT
Start: 2024-12-31

## 2024-12-31 RX ADMIN — PREDNISONE 40 MG: 20 TABLET ORAL at 11:58

## 2024-12-31 ASSESSMENT — COLUMBIA-SUICIDE SEVERITY RATING SCALE - C-SSRS
1. IN THE PAST MONTH, HAVE YOU WISHED YOU WERE DEAD OR WISHED YOU COULD GO TO SLEEP AND NOT WAKE UP?: NO
2. HAVE YOU ACTUALLY HAD ANY THOUGHTS OF KILLING YOURSELF IN THE PAST MONTH?: NO
6. HAVE YOU EVER DONE ANYTHING, STARTED TO DO ANYTHING, OR PREPARED TO DO ANYTHING TO END YOUR LIFE?: NO

## 2024-12-31 ASSESSMENT — ACTIVITIES OF DAILY LIVING (ADL)
ADLS_ACUITY_SCORE: 41
ADLS_ACUITY_SCORE: 41

## 2024-12-31 NOTE — NURSING NOTE
Current patient location: [address] [place] [id number]    Is the patient currently in the state of [place]? YES    Visit mode:VIDEO    If the visit is dropped, the patient can be reconnected by:VIDEO VISIT: Text to cell phone:   Telephone Information:   Mobile [phone]       Will anyone else be joining the visit? NO  (If patient encounters technical issues they should call [phone]    Are changes needed to the allergy or medication list? No, Pt stated no changes to allergies, and Pt stated no med changes    Are refills needed on medications prescribed by this physician? NO    Rooming Documentation:  Not applicable    Reason for visit: NASIR JAYF

## 2024-12-31 NOTE — PROGRESS NOTES
"Pt returned to clinic with spouse after a lab draw earlier this morning c/o angioedema that started 0925 with \"an odd sensation in my mouth,\" as previously.  This is third occurrence:     #1) April 2024 - cefdinir allergy noted  #2) October 2024 ([place] in [place]) - full anaphylactic tx, changed from lisinopril to amlodipine  #3) today - fasted except for black coffee from [employer].  Started amlodipine two months ago with no concerns.    Pt took 50 mg diphenhydramine at 0930 \"because of how fast it worsened last time.\"  Symptoms improved but not resolved.  Lower lip swelling noted, some swelling beneath the jaw line bilaterally, no erythema noted.  Pt states he feels \"some tingling\" along the R side of the mouth, not worsened since onset.  States \"my tongue feels a little swollen,\" no swelling noted on exam, airway patent.  Pt denies any difficulty swallowing, sore throat, difficulty breathing, wheezing, and nausea; endorses feeling \"pretty anxious.\"      Vitals taken: 175/103.  Home BPs run 120-130/mid-70s.  Pt denies chest pain, palpitations, headache, epistaxis, vision change, dizziness.      Pt left in care of YELENA Carmichael RN, for writer to huddle with Dr. Rangel who advises NOT to initiate anaphylaxis protocol at this time but pt should present to ED with the option of EMS or self-transport.  Pt and spouse agree to present to [place] via private vehicle.  Agreed that with any worsening of symptoms or onset of SOB or chest pain to call 911; patient verbalized understanding.    EMILY BaughN, PHN, RN-[facility]  [phone]      "

## 2024-12-31 NOTE — ED PROVIDER NOTES
"    [place] EMERGENCY DEPARTMENT ([facility])    12/31/24       ED PROVIDER NOTE     History     Chief Complaint   Patient presents with    Allergic Reaction     The history is provided by the patient and medical records.     Fabrice Donahue is a 59 year old male with history of hypertension, prior history of allergic reaction to Keflex (hives) and angioedema to lisinopril in October 2024 who presents the emergency department with concerns for an allergic reaction.  He presented to [facility] lab today for outpatient lab draw for testosterone levels.  After his lab draw he noticed an odd sensation in his mouth and 9:25 AM that he was concerned for angioedema.  Today he had fasted for his lab work with exception to black coffee from [employer] he took 50 mg of Benadryl at 9:30 AM \"because of how fast it worsened last time.\"  At clinic lower lip swelling noted, some swelling beneath the jaw line bilaterally, no erythema noted.  He was advised to come to the ED and came here via private vehicle.  He was given a prescription for 2 EpiPen today, but clinic did not administer any medications prior to patient being sent here.      Here, patient reports that his mouth and tongue began to tingle and swell at 9:25 today after lab draw.  He denies any recent medication changes or rashes.  Symptoms are largely improving.  No change of voice.  No trouble swallowing.  No trouble breathing.        Past Medical History  Past Medical History:   Diagnosis Date    Benign neoplasm of skin of other and unspecified parts of face     atypical nevus    Compression fracture of body of thoracic vertebra (H) 03/05/2022    Essential hypertension, benign     Hyperlipidemia     Lyme disease 08/2007    hot welts, bull eye bruise    Mixed hyperlipidemia     Other chronic nonalcoholic liver disease 2006    Fatty liver    Prediabetes     Shoulder pain, right     post clavicular fx    Syncope 07/2008    neg Head/Neck " CT    Unspecified hemorrhoids without mention of complication      Past Surgical History:   Procedure Laterality Date    COLONOSCOPY  2014    COLONOSCOPY N/A 2019    Procedure: COLONOSCOPY, WITH POLYPECTOMY;  Surgeon: José Baig MD;  [facility]    COLONOSCOPY N/A 2024    Procedure: Colonoscopy;  Surgeon: Mague Thakur MD;  [facility]     US ABDOMEN COMPLETE  2006    fatty liver    HERNIA REPAIR      SURGICAL HISTORY OF -       L Inguinal Hernia    SURGICAL HISTORY OF -       R Clavicle Fracture Repair    ZZC ECHO HEART XTHORACIC,COMPLETE, W/O DOPPLER      Nl     amLODIPine (NORVASC) 2.5 MG tablet  bisacodyl (DULCOLAX) 5 MG EC tablet  calcium citrate-vitamin D (CALCIUM CITRATE + D3) 315-5 MG-MCG TABS per tablet  metFORMIN (GLUCOPHAGE XR) 500 MG 24 hr tablet  polyethylene glycol (GOLYTELY) 236 g suspension  rosuvastatin (CRESTOR) 10 MG tablet  testosterone (ANDROGEL 1.62 % PUMP) 20.25 MG/ACT gel  traZODone (DESYREL) 50 MG tablet      Allergies   Allergen Reactions    Cephalexin Anaphylaxis    Lisinopril Angioedema    Ivp Dye [Contrast Dye]      Family History  Family History   Problem Relation Age of Onset    Hypertension Mother     Cancer Mother         kidney    Thyroid Disease Mother         hyper    Hyperlipidemia Mother     Respiratory Father         emphysema    Hypertension Sister     Hyperlipidemia Sister     Hypertension Brother     Hypertension Maternal Grandmother     Cerebrovascular Disease Maternal Grandmother     No Known Problems Daughter     No Known Problems Son     No Known Problems Son     No Known Problems Son     Diabetes Maternal Aunt         type II     Social History   Social History     Tobacco Use    Smoking status: Former     Current packs/day: 0.00     Average packs/day: 0.5 packs/day for 5.0 years (2.5 ttl pk-yrs)     Types: Cigarettes     Start date: 10/1/2005     Quit date: 10/1/2010     Years since quittin.2    Smokeless tobacco:  Never    Tobacco comments:     Already quit smoking   Vaping Use    Vaping status: Never Used   Substance Use Topics    Alcohol use: Yes     Comment: Less than 1 drink a week; occasional beer    Drug use: No      A medically appropriate review of systems was performed with pertinent positives and negatives noted in the HPI, and all other systems negative.    Physical Exam      Physical Exam  Physical Exam   Constitutional: oriented to person, place, and time. appears well-developed and well-nourished.   HENT: Minimal edema to the upper lip otherwise no mucosal swelling intraorally.  Uvula midline.  Tongue is not swollen.  Head: Normocephalic and atraumatic.   Neck: Normal range of motion.   Pulmonary/Chest: Effort normal. No respiratory distress.   Cardiac: No murmurs, rubs, gallops. RRR.  Abdominal: Abdomen soft, nontender, nondistended. No rebound tenderness.  MSK: Long bones without deformity or evidence of trauma  Neurological: alert and oriented to person, place, and time.   Skin: Skin is warm and dry.   Psychiatric:  normal mood and affect.  behavior is normal. Thought content normal.       ED Course, Procedures, & Data      Procedures          Results for orders placed or performed in visit on 12/31/24   Testosterone Free and Total     Status: None (In process)    Narrative    The following orders were created for panel order Testosterone Free and Total.  Procedure                               Abnormality         Status                     ---------                               -----------         ------                     Sex Hormone Binding Glob...[[id number]]                      In process                 Testosterone Free and Total[[id number]]                      In process                   Please view results for these tests on the individual orders.     Medications - No data to display  Labs Ordered and Resulted from Time of ED Arrival to Time of ED Departure - No data to display  No orders to  display          Critical care was not performed.     Medical Decision Making  The patient's presentation was of high complexity (an acute health issue posing potential threat to life or bodily function).    The patient's evaluation involved:  review of external note(s) from 1 sources (note from today from lab)    The patient's management necessitated moderate risk (prescription drug management including medications given in the ED).    Assessment & Plan    Patient presenting with concerns for angioedema.  Symptoms have been improving prior to arrival to emergency department.  Patient otherwise appears well.  He is given a dose of steroids.  I do not feel epinephrine is indicated with very very mild upper lip swelling and no other swelling or signs of anaphylaxis.  We do not know the exact reason why he does have angioedema.  He is discussing following up with an allergist.  Discussed return precautions.  Steroid sent to the pharmacy.    I have reviewed the nursing notes. I have reviewed the findings, diagnosis, plan and need for follow up with the patient.    New Prescriptions    PREDNISONE (DELTASONE) 20 MG TABLET    Take two tablets (= 40mg) each day for 5 (five) days       Final diagnoses:   Angioedema, initial encounter       Justin Perez MD    [facility] EMERGENCY DEPARTMENT  12/31/2024     Justin Perez MD  12/31/24 2925

## 2024-12-31 NOTE — PROGRESS NOTES
Writer stayed with patient while YELENA Rendon, RN, huddled with Dr. Rangel.  See YELENA To's note for additional information.  Explained to patient and spouse clinic does not have ability to monitor patients with these types of symptoms and writer anticipates recommendation will be Emergency Room evaluation.    Patient's oxygen level maintained at 97% during this time.    Patient relayed to writer similar reaction occurred with Cephalexin and Lisinopril.  Today, patient reports consuming plain [place] coffee only.  Patient encouraged to schedule a follow up visit with PCP after Emergency Room evaluation today and can discuss Allergy referral.    Patient stated he was not prescribed an Epipen previously and interested in having this on hand.  Writer will send request for Epipen RX to PCP today.  Pharmacy confirmed with patient-see 12/31/24 telephone encounter.    Patient and spouse left and plan to drive via private vehicle to Emergency Room.    EMILY DysonN, RN-[facility]
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
## 2024-12-31 NOTE — DISCHARGE INSTRUCTIONS
Return to the emergency department if you develop worsening swelling, difficulty swallowing, difficulty breathing or if you have any further concerns.
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
## 2024-12-31 NOTE — TELEPHONE ENCOUNTER
Sarah-Please review 12/31/24 Allied Nurse visit encounter for further information and sign if agree.    Thank you!  EMILY DysonN, RN-BC  [facility]

## 2024-12-31 NOTE — ED TRIAGE NOTES
"Chief Complaint: Patient presents to the ED with possible allergic reaction. Patient notes they felt swelling and tingling in lips around 0925 and took benadryl w/ no relief.      Onset of Symptoms: 0925    Home Remedies: Benadryl 50mg @ 0935    Triage Vital Signs: BP (!) 173/94   Pulse 55   Temp (!) 96.1  F (35.6  C) (Tympanic)   Resp 16   Ht 1.727 m (5' 8\")   Wt 75.3 kg (166 lb)   SpO2 96%   BMI 25.24 kg/m      Amador Saldana RN  December 31, 2024       Triage Assessment (Adult)       Row Name 12/31/24 1108          Triage Assessment    Airway WDL WDL        Respiratory WDL    Respiratory WDL WDL        Skin Circulation/Temperature WDL    Skin Circulation/Temperature WDL WDL        Cardiac WDL    Cardiac WDL WDL        Peripheral/Neurovascular WDL    Peripheral Neurovascular WDL WDL        Cognitive/Neuro/Behavioral WDL    Cognitive/Neuro/Behavioral WDL WDL                     "

## 2025-01-03 ENCOUNTER — TELEPHONE (OUTPATIENT)
Dept: ENDOCRINOLOGY | Facility: CLINIC | Age: 60
End: 2025-01-03

## 2025-01-05 DIAGNOSIS — R79.89 LOW TESTOSTERONE: ICD-10-CM

## 2025-01-06 RX ORDER — TESTOSTERONE 20.25 MG/1.25G
GEL TOPICAL
Qty: 75 G | Refills: 1 | Status: SHIPPED | OUTPATIENT
Start: 2025-01-06

## 2025-01-06 NOTE — TELEPHONE ENCOUNTER
Retail Pharmacy Prior Authorization Team   Phone: [phone]    PA Initiation    Medication: TESTOSTERONE 20.25 MG/ACT (1.62%) TD GEL  Insurance Company: [employer] ([facility]) - Phone [phone] Fax [phone]  Pharmacy Filling the Rx: [facility] - [place], [place] - [address]  Filling Pharmacy Phone: [phone]  Filling Pharmacy Fax:    Start Date: 1/5/2025    COMPLETED CRITERIA QUESTIONS VIA EPA - PENDING DETERMINATION

## 2025-01-06 NOTE — TELEPHONE ENCOUNTER
testosterone 20.25 mg/1.25 gram per pump act.(1.62 %) transdermal gel       Last Written Prescription Date:  9-3-24  Last Fill Quantity: 75 g,   # refills: 3  Last Office Visit : 12-31-24  Future Office visit:  12-30-25    BP Readings from Last 3 Encounters:   12/31/24 (!) 158/75   12/31/24 (!) 175/103   12/11/24 (!) 118/94      Routing refill request to provider for review/approval because:  Drug not on the [facility], P or  Health refill protocol or controlled substance  Overdue PSA -- orders in epic  BP above protocol parameters

## 2025-01-07 NOTE — TELEPHONE ENCOUNTER
Prior Authorization Approval    Medication: TESTOSTERONE 20.25 MG/ACT (1.62%) TD GEL  Authorization Effective Date: 1/5/2025  Authorization Expiration Date: 1/5/2026  Insurance Company: [employer] ([facility]) - Phone [phone] Fax [phone]  Which Pharmacy is filling the prescription: [facility] - [place], [place] [facility] [address]  Pharmacy Notified: YES  Patient Notified: YES (new rx was sent to pharmacy yesterday and notified patient of approval via boolinohart message)

## 2025-01-20 DIAGNOSIS — I10 HYPERTENSION GOAL BP (BLOOD PRESSURE) < 140/90: ICD-10-CM

## 2025-01-21 RX ORDER — AMLODIPINE BESYLATE 2.5 MG/1
2.5 TABLET ORAL DAILY
Qty: 90 TABLET | Refills: 0 | Status: SHIPPED | OUTPATIENT
Start: 2025-01-21

## 2025-02-13 ENCOUNTER — TRANSFERRED RECORDS (OUTPATIENT)
Dept: HEALTH INFORMATION MANAGEMENT | Facility: CLINIC | Age: 60
End: 2025-02-13
Payer: COMMERCIAL

## 2025-03-10 ENCOUNTER — MYC MEDICAL ADVICE (OUTPATIENT)
Dept: FAMILY MEDICINE | Facility: CLINIC | Age: 60
End: 2025-03-10
Payer: COMMERCIAL

## 2025-03-10 DIAGNOSIS — K76.0 FATTY LIVER: ICD-10-CM

## 2025-03-10 DIAGNOSIS — Z23 ENCOUNTER FOR IMMUNIZATION: Primary | ICD-10-CM

## 2025-03-10 DIAGNOSIS — I10 HYPERTENSION GOAL BP (BLOOD PRESSURE) < 140/90: ICD-10-CM

## 2025-03-10 NOTE — TELEPHONE ENCOUNTER
Sarah - MMR pended if in agreement.    YELENA Arora, BSN, PHN, AMB-BC (she/her)  [facility] RN

## 2025-03-11 ENCOUNTER — ALLIED HEALTH/NURSE VISIT (OUTPATIENT)
Dept: PEDIATRICS | Facility: CLINIC | Age: 60
End: 2025-03-11
Payer: COMMERCIAL

## 2025-03-11 VITALS — TEMPERATURE: 98.2 F

## 2025-03-11 DIAGNOSIS — Z23 ENCOUNTER FOR IMMUNIZATION: Primary | ICD-10-CM

## 2025-03-11 PROCEDURE — 99207 PR NO CHARGE NURSE ONLY: CPT

## 2025-03-11 NOTE — PROGRESS NOTES
Prior to immunization administration, verified patients identity using patient s name and date of birth. Please see Immunization Activity for additional information.     Is the patient's temperature normal (100.5 or less)? Yes     Patient MEETS CRITERIA. PROCEED with vaccine administration.          3/11/2025   MMR   Have you had a serious reaction to the M-M-R II or ProQuad vaccine or to something in these vaccines (like neomycin, gelatin)? No   Are you immunocompromised? No   Have you gotten antibody blood products in the  last 11 months? No   Do you have low platelet counts in your blood (thrombocytopenia) or does your blood not clot properly (thrombocytopenia purpura)? No   Do you have an allergy to eggs? No   Have you been exposed toTuberculosis (last 6 months) or recently treated or needing tests in the near future? No   Have you received a vaccine in the past 28 days? No         Patient MEETS CRITERIA. PROCEED with vaccine administration.      Patient instructed to remain in clinic for 15 minutes afterwards, and to report any adverse reactions.      Link to Ancillary Visit Immunization Standing Orders SmartSet     Screening performed by Starr Johnston MA on 3/11/2025 at 11:45 AM.

## 2025-03-24 ENCOUNTER — LAB (OUTPATIENT)
Dept: LAB | Facility: CLINIC | Age: 60
End: 2025-03-24
Payer: COMMERCIAL

## 2025-03-24 DIAGNOSIS — I10 HYPERTENSION GOAL BP (BLOOD PRESSURE) < 140/90: ICD-10-CM

## 2025-03-24 DIAGNOSIS — K76.0 FATTY LIVER: ICD-10-CM

## 2025-03-24 DIAGNOSIS — Z23 ENCOUNTER FOR IMMUNIZATION: ICD-10-CM

## 2025-03-24 LAB
ALBUMIN SERPL BCG-MCNC: 4.4 G/DL (ref 3.5–5.2)
ALP SERPL-CCNC: 74 U/L (ref 40–150)
ALT SERPL W P-5'-P-CCNC: 22 U/L (ref 0–70)
ANION GAP SERPL CALCULATED.3IONS-SCNC: 6 MMOL/L (ref 7–15)
AST SERPL W P-5'-P-CCNC: 26 U/L (ref 0–45)
BILIRUB SERPL-MCNC: 0.5 MG/DL
BUN SERPL-MCNC: 18.6 MG/DL (ref 8–23)
CALCIUM SERPL-MCNC: 9.8 MG/DL (ref 8.8–10.4)
CHLORIDE SERPL-SCNC: 105 MMOL/L (ref 98–107)
CREAT SERPL-MCNC: 0.76 MG/DL (ref 0.67–1.17)
EGFRCR SERPLBLD CKD-EPI 2021: >90 ML/MIN/1.73M2
GLUCOSE SERPL-MCNC: 103 MG/DL (ref 70–99)
HCO3 SERPL-SCNC: 31 MMOL/L (ref 22–29)
POTASSIUM SERPL-SCNC: 4.7 MMOL/L (ref 3.4–5.3)
PROT SERPL-MCNC: 7 G/DL (ref 6.4–8.3)
SODIUM SERPL-SCNC: 142 MMOL/L (ref 135–145)

## 2025-03-24 PROCEDURE — 80053 COMPREHEN METABOLIC PANEL: CPT

## 2025-03-24 PROCEDURE — 36415 COLL VENOUS BLD VENIPUNCTURE: CPT

## 2025-03-25 NOTE — RESULT ENCOUNTER NOTE
Dear Fabrice,    Your electrolytes, kidney function, liver function and blood sugar were normal. (Blood sugar is normal for non-fasting.)      Take care,  Sarah Rdz PA-C

## 2025-04-22 DIAGNOSIS — I10 HYPERTENSION GOAL BP (BLOOD PRESSURE) < 140/90: ICD-10-CM

## 2025-04-22 RX ORDER — AMLODIPINE BESYLATE 2.5 MG/1
2.5 TABLET ORAL DAILY
Qty: 90 TABLET | Refills: 0 | Status: SHIPPED | OUTPATIENT
Start: 2025-04-22

## 2025-05-19 DIAGNOSIS — R73.03 PREDIABETES: ICD-10-CM

## 2025-05-19 RX ORDER — METFORMIN HYDROCHLORIDE 500 MG/1
500 TABLET, EXTENDED RELEASE ORAL
Qty: 90 TABLET | Refills: 1 | Status: SHIPPED | OUTPATIENT
Start: 2025-05-19

## 2025-05-20 ENCOUNTER — TRANSFERRED RECORDS (OUTPATIENT)
Dept: HEALTH INFORMATION MANAGEMENT | Facility: CLINIC | Age: 60
End: 2025-05-20
Payer: COMMERCIAL

## 2025-07-21 DIAGNOSIS — I10 HYPERTENSION GOAL BP (BLOOD PRESSURE) < 140/90: ICD-10-CM

## 2025-07-21 RX ORDER — AMLODIPINE BESYLATE 2.5 MG/1
2.5 TABLET ORAL DAILY
Qty: 90 TABLET | Refills: 0 | Status: SHIPPED | OUTPATIENT
Start: 2025-07-21

## 2025-07-24 ENCOUNTER — PATIENT OUTREACH (OUTPATIENT)
Dept: CARE COORDINATION | Facility: CLINIC | Age: 60
End: 2025-07-24
Payer: COMMERCIAL

## 2025-08-13 ENCOUNTER — PRE VISIT (OUTPATIENT)
Dept: UROLOGY | Facility: CLINIC | Age: 60
End: 2025-08-13
Payer: COMMERCIAL

## (undated) DEVICE — GOWN IMPERVIOUS 2XL BLUE

## (undated) DEVICE — KIT ENDO TURNOVER/PROCEDURE CARRY-ON 101822

## (undated) DEVICE — SOL WATER IRRIG 500ML BOTTLE 2F7113

## (undated) DEVICE — SUCTION MANIFOLD NEPTUNE 2 SYS 1 PORT 702-025-000

## (undated) DEVICE — SOL WATER IRRIG 1000ML BOTTLE 2F7114

## (undated) DEVICE — KIT ENDO FIRST STEP DISINFECTANT 200ML W/POUCH EP-4

## (undated) DEVICE — SPECIMEN CONTAINER 3OZ W/FORMALIN 59901

## (undated) DEVICE — TUBING SUCTION MEDI-VAC 1/4"X20' N620A

## (undated) DEVICE — TUBING SUCTION 12"X1/4" N612

## (undated) DEVICE — FCP BIOPSY RADIAL JAW 4 JUMBO 3.2MM CHANNEL M00513370

## (undated) RX ORDER — FENTANYL CITRATE 50 UG/ML
INJECTION, SOLUTION INTRAMUSCULAR; INTRAVENOUS
Status: DISPENSED
Start: 2024-12-11

## (undated) RX ORDER — FENTANYL CITRATE 50 UG/ML
INJECTION, SOLUTION INTRAMUSCULAR; INTRAVENOUS
Status: DISPENSED
Start: 2019-08-26